# Patient Record
Sex: FEMALE | Race: WHITE | Employment: FULL TIME | URBAN - METROPOLITAN AREA
[De-identification: names, ages, dates, MRNs, and addresses within clinical notes are randomized per-mention and may not be internally consistent; named-entity substitution may affect disease eponyms.]

---

## 2024-02-27 ENCOUNTER — APPOINTMENT (EMERGENCY)
Dept: CT IMAGING | Facility: HOSPITAL | Age: 34
DRG: 682 | End: 2024-02-27
Payer: COMMERCIAL

## 2024-02-27 ENCOUNTER — APPOINTMENT (EMERGENCY)
Dept: RADIOLOGY | Facility: HOSPITAL | Age: 34
DRG: 682 | End: 2024-02-27
Payer: COMMERCIAL

## 2024-02-27 ENCOUNTER — HOSPITAL ENCOUNTER (INPATIENT)
Facility: HOSPITAL | Age: 34
LOS: 2 days | Discharge: HOME | DRG: 682 | End: 2024-02-29
Attending: EMERGENCY MEDICINE | Admitting: STUDENT IN AN ORGANIZED HEALTH CARE EDUCATION/TRAINING PROGRAM
Payer: COMMERCIAL

## 2024-02-27 DIAGNOSIS — E13.10 DIABETIC KETOACIDOSIS WITHOUT COMA ASSOCIATED WITH OTHER SPECIFIED DIABETES MELLITUS (HCC): ICD-10-CM

## 2024-02-27 DIAGNOSIS — E11.00 HYPEROSMOLAR HYPERGLYCEMIC STATE (HHS) (HCC): ICD-10-CM

## 2024-02-27 DIAGNOSIS — R73.9 HYPERGLYCEMIA: Primary | ICD-10-CM

## 2024-02-27 DIAGNOSIS — R79.89 ELEVATED SERUM CREATININE: ICD-10-CM

## 2024-02-27 DIAGNOSIS — R74.01 TRANSAMINITIS: ICD-10-CM

## 2024-02-27 PROBLEM — E87.0 HYPERNATREMIA: Status: ACTIVE | Noted: 2024-02-27

## 2024-02-27 PROBLEM — N17.9 AKI (ACUTE KIDNEY INJURY) (HCC): Status: ACTIVE | Noted: 2024-02-27

## 2024-02-27 PROBLEM — D72.829 LEUKOCYTOSIS: Status: ACTIVE | Noted: 2024-02-27

## 2024-02-27 PROBLEM — R00.0 SINUS TACHYCARDIA: Status: ACTIVE | Noted: 2024-02-27

## 2024-02-27 LAB
2HR DELTA HS TROPONIN: 9 NG/L
4HR DELTA HS TROPONIN: 28 NG/L
ALBUMIN SERPL BCG-MCNC: 4.1 G/DL (ref 3.5–5)
ALP SERPL-CCNC: 198 U/L (ref 34–104)
ALT SERPL W P-5'-P-CCNC: 181 U/L (ref 7–52)
ANION GAP SERPL CALCULATED.3IONS-SCNC: 13 MMOL/L
ANION GAP SERPL CALCULATED.3IONS-SCNC: 31 MMOL/L
AST SERPL W P-5'-P-CCNC: 89 U/L (ref 13–39)
B-OH-BUTYR SERPL-MCNC: 11.12 MMOL/L (ref 0.02–0.27)
BACTERIA UR QL AUTO: ABNORMAL /HPF
BASE EX.OXY STD BLDV CALC-SCNC: 70 % (ref 60–80)
BASE EX.OXY STD BLDV CALC-SCNC: 75.5 % (ref 60–80)
BASE EXCESS BLDV CALC-SCNC: -0.8 MMOL/L
BASE EXCESS BLDV CALC-SCNC: -10.3 MMOL/L
BASOPHILS # BLD AUTO: 0.03 THOUSANDS/ΜL (ref 0–0.1)
BASOPHILS NFR BLD AUTO: 0 % (ref 0–1)
BILIRUB SERPL-MCNC: 0.51 MG/DL (ref 0.2–1)
BILIRUB UR QL STRIP: NEGATIVE
BUN SERPL-MCNC: 29 MG/DL (ref 5–25)
BUN SERPL-MCNC: 53 MG/DL (ref 5–25)
CA-I BLD-SCNC: 1.14 MMOL/L (ref 1.12–1.32)
CALCIUM SERPL-MCNC: 10 MG/DL (ref 8.4–10.2)
CALCIUM SERPL-MCNC: 8.8 MG/DL (ref 8.4–10.2)
CARDIAC TROPONIN I PNL SERPL HS: 19 NG/L
CARDIAC TROPONIN I PNL SERPL HS: 28 NG/L
CARDIAC TROPONIN I PNL SERPL HS: 47 NG/L
CHLORIDE SERPL-SCNC: 101 MMOL/L (ref 96–108)
CHLORIDE SERPL-SCNC: 115 MMOL/L (ref 96–108)
CLARITY UR: CLEAR
CO2 SERPL-SCNC: 15 MMOL/L (ref 21–32)
CO2 SERPL-SCNC: 24 MMOL/L (ref 21–32)
COLOR UR: ABNORMAL
CREAT SERPL-MCNC: 1.18 MG/DL (ref 0.6–1.3)
CREAT SERPL-MCNC: 1.82 MG/DL (ref 0.6–1.3)
D DIMER PPP FEU-MCNC: 0.92 UG/ML FEU
EOSINOPHIL # BLD AUTO: 0 THOUSAND/ΜL (ref 0–0.61)
EOSINOPHIL NFR BLD AUTO: 0 % (ref 0–6)
ERYTHROCYTE [DISTWIDTH] IN BLOOD BY AUTOMATED COUNT: 12.5 % (ref 11.6–15.1)
FLUAV RNA RESP QL NAA+PROBE: NEGATIVE
FLUBV RNA RESP QL NAA+PROBE: NEGATIVE
GFR SERPL CREATININE-BSD FRML MDRD: 36 ML/MIN/1.73SQ M
GFR SERPL CREATININE-BSD FRML MDRD: 61 ML/MIN/1.73SQ M
GLUCOSE SERPL-MCNC: 1004 MG/DL (ref 65–140)
GLUCOSE SERPL-MCNC: 201 MG/DL (ref 65–140)
GLUCOSE SERPL-MCNC: 242 MG/DL (ref 65–140)
GLUCOSE SERPL-MCNC: 289 MG/DL (ref 65–140)
GLUCOSE SERPL-MCNC: 362 MG/DL (ref 65–140)
GLUCOSE SERPL-MCNC: >500 MG/DL (ref 65–140)
GLUCOSE SERPL-MCNC: >500 MG/DL (ref 65–140)
GLUCOSE UR STRIP-MCNC: ABNORMAL MG/DL
HCG SERPL QL: NEGATIVE
HCO3 BLDV-SCNC: 14.5 MMOL/L (ref 24–30)
HCO3 BLDV-SCNC: 23.9 MMOL/L (ref 24–30)
HCT VFR BLD AUTO: 46.2 % (ref 34.8–46.1)
HGB BLD-MCNC: 15.1 G/DL (ref 11.5–15.4)
HGB UR QL STRIP.AUTO: ABNORMAL
HYALINE CASTS #/AREA URNS LPF: ABNORMAL /LPF
IMM GRANULOCYTES # BLD AUTO: 0.05 THOUSAND/UL (ref 0–0.2)
IMM GRANULOCYTES NFR BLD AUTO: 0 % (ref 0–2)
KETONES UR STRIP-MCNC: ABNORMAL MG/DL
LEUKOCYTE ESTERASE UR QL STRIP: ABNORMAL
LYMPHOCYTES # BLD AUTO: 2.36 THOUSANDS/ΜL (ref 0.6–4.47)
LYMPHOCYTES NFR BLD AUTO: 16 % (ref 14–44)
MAGNESIUM SERPL-MCNC: 2.4 MG/DL (ref 1.9–2.7)
MCH RBC QN AUTO: 28.7 PG (ref 26.8–34.3)
MCHC RBC AUTO-ENTMCNC: 32.7 G/DL (ref 31.4–37.4)
MCV RBC AUTO: 88 FL (ref 82–98)
MONOCYTES # BLD AUTO: 1.21 THOUSAND/ΜL (ref 0.17–1.22)
MONOCYTES NFR BLD AUTO: 8 % (ref 4–12)
MUCOUS THREADS UR QL AUTO: ABNORMAL
NEUTROPHILS # BLD AUTO: 11.33 THOUSANDS/ΜL (ref 1.85–7.62)
NEUTS SEG NFR BLD AUTO: 76 % (ref 43–75)
NITRITE UR QL STRIP: NEGATIVE
NON-SQ EPI CELLS URNS QL MICRO: ABNORMAL /HPF
NRBC BLD AUTO-RTO: 0 /100 WBCS
O2 CT BLDV-SCNC: 14.7 ML/DL
O2 CT BLDV-SCNC: 16.2 ML/DL
PCO2 BLDV: 29.6 MM HG (ref 42–50)
PCO2 BLDV: 39.8 MM HG (ref 42–50)
PH BLDV: 7.31 [PH] (ref 7.3–7.4)
PH BLDV: 7.4 [PH] (ref 7.3–7.4)
PH UR STRIP.AUTO: 5 [PH]
PHOSPHATE SERPL-MCNC: 2.1 MG/DL (ref 2.7–4.5)
PLATELET # BLD AUTO: 511 THOUSANDS/UL (ref 149–390)
PMV BLD AUTO: 12.4 FL (ref 8.9–12.7)
PO2 BLDV: 33.5 MM HG (ref 35–45)
PO2 BLDV: 41.6 MM HG (ref 35–45)
POTASSIUM SERPL-SCNC: 3.6 MMOL/L (ref 3.5–5.3)
POTASSIUM SERPL-SCNC: 5.3 MMOL/L (ref 3.5–5.3)
PROT SERPL-MCNC: 8 G/DL (ref 6.4–8.4)
PROT UR STRIP-MCNC: ABNORMAL MG/DL
RBC # BLD AUTO: 5.27 MILLION/UL (ref 3.81–5.12)
RBC #/AREA URNS AUTO: ABNORMAL /HPF
RSV RNA RESP QL NAA+PROBE: NEGATIVE
SARS-COV-2 RNA RESP QL NAA+PROBE: NEGATIVE
SODIUM SERPL-SCNC: 147 MMOL/L (ref 135–147)
SODIUM SERPL-SCNC: 152 MMOL/L (ref 135–147)
SP GR UR STRIP.AUTO: 1.03 (ref 1–1.03)
UROBILINOGEN UR STRIP-ACNC: <2 MG/DL
WBC # BLD AUTO: 14.98 THOUSAND/UL (ref 4.31–10.16)
WBC #/AREA URNS AUTO: ABNORMAL /HPF

## 2024-02-27 PROCEDURE — 82805 BLOOD GASES W/O2 SATURATION: CPT | Performed by: NURSE PRACTITIONER

## 2024-02-27 PROCEDURE — 82948 REAGENT STRIP/BLOOD GLUCOSE: CPT

## 2024-02-27 PROCEDURE — 36415 COLL VENOUS BLD VENIPUNCTURE: CPT | Performed by: PHYSICIAN ASSISTANT

## 2024-02-27 PROCEDURE — 83036 HEMOGLOBIN GLYCOSYLATED A1C: CPT | Performed by: STUDENT IN AN ORGANIZED HEALTH CARE EDUCATION/TRAINING PROGRAM

## 2024-02-27 PROCEDURE — 0241U HB NFCT DS VIR RESP RNA 4 TRGT: CPT | Performed by: PHYSICIAN ASSISTANT

## 2024-02-27 PROCEDURE — 83735 ASSAY OF MAGNESIUM: CPT | Performed by: NURSE PRACTITIONER

## 2024-02-27 PROCEDURE — 96365 THER/PROPH/DIAG IV INF INIT: CPT

## 2024-02-27 PROCEDURE — 71275 CT ANGIOGRAPHY CHEST: CPT

## 2024-02-27 PROCEDURE — 71045 X-RAY EXAM CHEST 1 VIEW: CPT

## 2024-02-27 PROCEDURE — 82805 BLOOD GASES W/O2 SATURATION: CPT | Performed by: PHYSICIAN ASSISTANT

## 2024-02-27 PROCEDURE — 80053 COMPREHEN METABOLIC PANEL: CPT | Performed by: PHYSICIAN ASSISTANT

## 2024-02-27 PROCEDURE — 99285 EMERGENCY DEPT VISIT HI MDM: CPT | Performed by: EMERGENCY MEDICINE

## 2024-02-27 PROCEDURE — 96361 HYDRATE IV INFUSION ADD-ON: CPT

## 2024-02-27 PROCEDURE — 99285 EMERGENCY DEPT VISIT HI MDM: CPT

## 2024-02-27 PROCEDURE — 82010 KETONE BODYS QUAN: CPT | Performed by: PHYSICIAN ASSISTANT

## 2024-02-27 PROCEDURE — 84100 ASSAY OF PHOSPHORUS: CPT | Performed by: NURSE PRACTITIONER

## 2024-02-27 PROCEDURE — 81001 URINALYSIS AUTO W/SCOPE: CPT | Performed by: PHYSICIAN ASSISTANT

## 2024-02-27 PROCEDURE — 85025 COMPLETE CBC W/AUTO DIFF WBC: CPT | Performed by: PHYSICIAN ASSISTANT

## 2024-02-27 PROCEDURE — 93005 ELECTROCARDIOGRAM TRACING: CPT

## 2024-02-27 PROCEDURE — 85379 FIBRIN DEGRADATION QUANT: CPT | Performed by: PHYSICIAN ASSISTANT

## 2024-02-27 PROCEDURE — 84484 ASSAY OF TROPONIN QUANT: CPT | Performed by: PHYSICIAN ASSISTANT

## 2024-02-27 PROCEDURE — 80048 BASIC METABOLIC PNL TOTAL CA: CPT | Performed by: NURSE PRACTITIONER

## 2024-02-27 PROCEDURE — 99291 CRITICAL CARE FIRST HOUR: CPT | Performed by: STUDENT IN AN ORGANIZED HEALTH CARE EDUCATION/TRAINING PROGRAM

## 2024-02-27 PROCEDURE — 82330 ASSAY OF CALCIUM: CPT | Performed by: NURSE PRACTITIONER

## 2024-02-27 PROCEDURE — 84703 CHORIONIC GONADOTROPIN ASSAY: CPT | Performed by: PHYSICIAN ASSISTANT

## 2024-02-27 RX ORDER — SODIUM CHLORIDE, SODIUM GLUCONATE, SODIUM ACETATE, POTASSIUM CHLORIDE, MAGNESIUM CHLORIDE, SODIUM PHOSPHATE, DIBASIC, AND POTASSIUM PHOSPHATE .53; .5; .37; .037; .03; .012; .00082 G/100ML; G/100ML; G/100ML; G/100ML; G/100ML; G/100ML; G/100ML
75 INJECTION, SOLUTION INTRAVENOUS CONTINUOUS
Status: DISCONTINUED | OUTPATIENT
Start: 2024-02-27 | End: 2024-02-29 | Stop reason: HOSPADM

## 2024-02-27 RX ORDER — ENOXAPARIN SODIUM 100 MG/ML
40 INJECTION SUBCUTANEOUS DAILY
Status: DISCONTINUED | OUTPATIENT
Start: 2024-02-28 | End: 2024-02-27

## 2024-02-27 RX ORDER — HEPARIN SODIUM 5000 [USP'U]/ML
5000 INJECTION, SOLUTION INTRAVENOUS; SUBCUTANEOUS EVERY 8 HOURS SCHEDULED
Status: DISCONTINUED | OUTPATIENT
Start: 2024-02-28 | End: 2024-02-29 | Stop reason: HOSPADM

## 2024-02-27 RX ORDER — DEXTROSE MONOHYDRATE 50 MG/ML
50 INJECTION, SOLUTION INTRAVENOUS CONTINUOUS
Status: DISCONTINUED | OUTPATIENT
Start: 2024-02-27 | End: 2024-02-28

## 2024-02-27 RX ADMIN — SODIUM CHLORIDE 1000 ML: 0.9 INJECTION, SOLUTION INTRAVENOUS at 18:00

## 2024-02-27 RX ADMIN — SODIUM CHLORIDE 2000 ML: 0.9 INJECTION, SOLUTION INTRAVENOUS at 13:24

## 2024-02-27 RX ADMIN — HEPARIN SODIUM 5000 UNITS: 5000 INJECTION INTRAVENOUS; SUBCUTANEOUS at 23:54

## 2024-02-27 RX ADMIN — IOHEXOL 85 ML: 350 INJECTION, SOLUTION INTRAVENOUS at 16:29

## 2024-02-27 RX ADMIN — DEXTROSE 50 ML/HR: 5 SOLUTION INTRAVENOUS at 23:54

## 2024-02-27 RX ADMIN — SODIUM CHLORIDE 10 UNITS/HR: 9 INJECTION, SOLUTION INTRAVENOUS at 16:50

## 2024-02-27 RX ADMIN — SODIUM CHLORIDE, SODIUM GLUCONATE, SODIUM ACETATE, POTASSIUM CHLORIDE, MAGNESIUM CHLORIDE, SODIUM PHOSPHATE, DIBASIC, AND POTASSIUM PHOSPHATE 125 ML/HR: .53; .5; .37; .037; .03; .012; .00082 INJECTION, SOLUTION INTRAVENOUS at 23:17

## 2024-02-27 NOTE — ED PROVIDER NOTES
History  Chief Complaint   Patient presents with    Hyperglycemia - Symptomatic     Fatigue, muscle cramps, thirst x 2 weeks. Blood sugar checked at home. Read over 600, no dx of DM. Blood pressure 80/50 at home.      Yu Ariza is a 33-year-old female with no known pmhx arriving to the emergency department accompanied by family members for evaluation with chief complaint of dry mouth and increased thirst.  She reports that symptoms have been gradually worsening over the past 2 weeks, even more so within the past 48 hours.  She reports symptoms of fatigue, generalized weakness, exertional dyspnea, polyuria. She denies any chest pain, palpitations, near syncope, abdominal pain, nausea/vomiting/diarrhea. POCT glucose on arrival greater than 500. Patient denies any pertinent past medical history and also does not take any medications.  Denies exogenous estrogen use. She denies alcohol or tobacco use.  Reports flulike symptoms about 2 weeks ago during which time she presented to urgent care at that time and had been diagnosed with pansinusitis and received a prescription for amoxicillin. She denies residual symptoms presently.  Patient and family members offer no other complaints or concerns at this time.         None       History reviewed. No pertinent past medical history.    History reviewed. No pertinent surgical history.    History reviewed. No pertinent family history.  I have reviewed and agree with the history as documented.    E-Cigarette/Vaping     E-Cigarette/Vaping Substances     Social History     Tobacco Use    Smoking status: Never    Smokeless tobacco: Never   Substance Use Topics    Alcohol use: Not Currently    Drug use: Not Currently       Review of Systems   Constitutional:  Positive for fatigue.   Respiratory:  Positive for shortness of breath.    Cardiovascular:  Negative for chest pain.   Gastrointestinal:  Negative for abdominal pain, diarrhea, nausea and vomiting.   Endocrine: Positive  for polydipsia and polyphagia.   Musculoskeletal:  Negative for back pain.   Skin:  Negative for pallor.   Neurological:  Positive for weakness. Negative for dizziness and headaches.   All other systems reviewed and are negative.      Physical Exam  Physical Exam  Vitals and nursing note reviewed.   Constitutional:       General: She is not in acute distress.     Appearance: Normal appearance. She is well-developed. She is not toxic-appearing or diaphoretic.   HENT:      Head: Normocephalic and atraumatic.      Right Ear: External ear normal.      Left Ear: External ear normal.      Mouth/Throat:      Mouth: Mucous membranes are dry.   Eyes:      Conjunctiva/sclera: Conjunctivae normal.   Cardiovascular:      Rate and Rhythm: Regular rhythm. Tachycardia present.      Pulses: Normal pulses.   Pulmonary:      Effort: Pulmonary effort is normal. No respiratory distress.      Breath sounds: Normal breath sounds. No wheezing, rhonchi or rales.      Comments: Normal respiratory effort  Chest:      Chest wall: No tenderness.   Abdominal:      General: There is no distension.      Palpations: Abdomen is soft.      Tenderness: There is no abdominal tenderness.   Musculoskeletal:         General: Normal range of motion.      Cervical back: Normal range of motion and neck supple.   Skin:     General: Skin is warm and dry.      Capillary Refill: Capillary refill takes less than 2 seconds.   Neurological:      Mental Status: She is alert.      Motor: Motor function is intact. No weakness.      Gait: Gait is intact.   Psychiatric:         Mood and Affect: Mood normal.         Vital Signs  ED Triage Vitals   Temperature Pulse Respirations Blood Pressure SpO2   02/27/24 1255 02/27/24 1258 02/27/24 1258 02/27/24 1258 02/27/24 1258   97.9 °F (36.6 °C) (!) 147 18 116/74 98 %      Temp Source Heart Rate Source Patient Position - Orthostatic VS BP Location FiO2 (%)   02/27/24 1255 02/27/24 1430 02/27/24 1258 02/27/24 1258 --   Temporal  Monitor Sitting Left arm       Pain Score       --                  Vitals:    02/27/24 1600 02/27/24 1645 02/27/24 1700 02/27/24 1730   BP: 123/58 120/71 129/79 125/85   Pulse: (!) 132 (!) 133 (!) 135 (!) 139   Patient Position - Orthostatic VS: Lying Lying Lying Lying         Visual Acuity  Visual Acuity      Flowsheet Row Most Recent Value   L Pupil Size (mm) 3   R Pupil Size (mm) 3            ED Medications  Medications   insulin regular (HumuLIN R,NovoLIN R) 1 Units/mL in sodium chloride 0.9 % 100 mL infusion (10 Units/hr Intravenous New Bag 2/27/24 1650)   sodium chloride 0.9 % bolus 1,000 mL (1,000 mL Intravenous New Bag 2/27/24 1800)   sodium chloride 0.9 % bolus 2,000 mL (0 mL Intravenous Stopped 2/27/24 1424)   iohexol (OMNIPAQUE) 350 MG/ML injection (MULTI-DOSE) 85 mL (85 mL Intravenous Given 2/27/24 1629)       Diagnostic Studies  Results Reviewed       Procedure Component Value Units Date/Time    Fingerstick Glucose (POCT) [[id number]]  (Abnormal) Collected: 02/27/24 1727    Lab Status: Final result Updated: 02/27/24 1728     POC Glucose >500 mg/dl     HS Troponin I 2hr [[id number]] Collected: 02/27/24 1539    Lab Status: No result Specimen: Blood from Arm, Right     HS Troponin I 4hr [[id number]]     Lab Status: No result Specimen: Blood     hCG, qualitative pregnancy [[id number]]  (Normal) Collected: 02/27/24 1410    Lab Status: Final result Specimen: Blood from Arm, Right Updated: 02/27/24 1445     Preg, Serum Negative    Blood gas, venous [[id number]]  (Abnormal) Collected: 02/27/24 1410    Lab Status: Final result Specimen: Blood from Arm, Right Updated: 02/27/24 1415     pH, Ang 7.307     pCO2, Ang 29.6 mm Hg      pO2, Ang 41.6 mm Hg      HCO3, Ang 14.5 mmol/L      Base Excess, Ang -10.3 mmol/L      O2 Content, Ang 16.2 ml/dL      O2 HGB, VENOUS 75.5 %     Urine Microscopic [[id number]]  (Abnormal) Collected: 02/27/24 7602    Lab Status: Final result Specimen: Urine, Clean Catch Updated: 02/27/24 7498      RBC, UA 1-2 /hpf      WBC, UA 1-2 /hpf      Epithelial Cells Occasional /hpf      Bacteria, UA Moderate /hpf      MUCUS THREADS Occasional     Hyaline Casts, UA 0-3 /lpf     FLU/RSV/COVID - if FLU/RSV clinically relevant [[id number]]  (Normal) Collected: 02/27/24 1326    Lab Status: Final result Specimen: Nares from Nose Updated: 02/27/24 1412     SARS-CoV-2 Negative     INFLUENZA A PCR Negative     INFLUENZA B PCR Negative     RSV PCR Negative    Narrative:      FOR PEDIATRIC PATIENTS - copy/paste COVID Guidelines URL to browser: [email or web address]    SARS-CoV-2 assay is a Nucleic Acid Amplification assay intended for the  qualitative detection of nucleic acid from SARS-CoV-2 in nasopharyngeal  swabs. Results are for the presumptive identification of SARS-CoV-2 RNA.    Positive results are indicative of infection with SARS-CoV-2, the virus  causing COVID-19, but do not rule out bacterial infection or co-infection  with other viruses. Laboratories within the [place] and its  territories are required to report all positive results to the appropriate  public health authorities. Negative results do not preclude SARS-CoV-2  infection and should not be used as the sole basis for treatment or other  patient management decisions. Negative results must be combined with  clinical observations, patient history, and epidemiological information.  This test has not been FDA cleared or approved.    This test has been authorized by FDA under an Emergency Use Authorization  (EUA). This test is only authorized for the duration of time the  declaration that circumstances exist justifying the authorization of the  emergency use of an in vitro diagnostic tests for detection of SARS-CoV-2  virus and/or diagnosis of COVID-19 infection under section 564(b)(1) of  the Act, 21 U.S.C. 360bbb-3(b)(1), unless the authorization is terminated  or revoked sooner. The test has been  validated but independent review by FDA  and CLIA is pending.    Test performed using [employer] GeneXpert: This RT-PCR assay targets N2,  a region unique to SARS-CoV-2. A conserved region in the E-gene was chosen  for pan-Sarbecovirus detection which includes SARS-CoV-2.    According to CMS-2020-01-R, this platform meets the definition of high-throughput technology.    Comprehensive metabolic panel [[id number]]  (Abnormal) Collected: 02/27/24 1320    Lab Status: Final result Specimen: Blood from Arm, Right Updated: 02/27/24 1411     Sodium 147 mmol/L      Potassium 5.3 mmol/L      Chloride 101 mmol/L      CO2 15 mmol/L      ANION GAP 31 mmol/L      BUN 53 mg/dL      Creatinine 1.82 mg/dL      Glucose 1,004 mg/dL      Calcium 10.0 mg/dL      AST 89 U/L       U/L      Alkaline Phosphatase 198 U/L      Total Protein 8.0 g/dL      Albumin 4.1 g/dL      Total Bilirubin 0.51 mg/dL      eGFR 36 ml/min/1.73sq m     Narrative:      National Kidney Disease Foundation guidelines for Chronic Kidney Disease (CKD):     Stage 1 with normal or high GFR (GFR > 90 mL/min/1.73 square meters)    Stage 2 Mild CKD (GFR = 60-89 mL/min/1.73 square meters)    Stage 3A Moderate CKD (GFR = 45-59 mL/min/1.73 square meters)    Stage 3B Moderate CKD (GFR = 30-44 mL/min/1.73 square meters)    Stage 4 Severe CKD (GFR = 15-29 mL/min/1.73 square meters)    Stage 5 End Stage CKD (GFR <15 mL/min/1.73 square meters)  Note: GFR calculation is accurate only with a steady state creatinine    Beta Hydroxybutyrate [[id number]]  (Abnormal) Collected: 02/27/24 1320    Lab Status: Final result Specimen: Blood from Arm, Right Updated: 02/27/24 1407     Beta- Hydroxybutyrate 11.12 mmol/L     UA w Reflex to Microscopic w Reflex to Culture [[id number]]  (Abnormal) Collected: 02/27/24 1352    Lab Status: Final result Specimen: Urine, Clean Catch Updated: 02/27/24 1406     Color, UA Light Yellow     Clarity, UA Clear     Specific Gravity, UA 1.031     pH, UA 5.0      Leukocytes, UA Elevated glucose may cause decreased leukocyte values. See urine microscopic for UWBC result     Nitrite, UA Negative     Protein, UA Trace mg/dl      Glucose, UA >=1000 (1%) mg/dl      Ketones, UA 40 (2+) mg/dl      Urobilinogen, UA <2.0 mg/dl      Bilirubin, UA Negative     Occult Blood, UA Large    HS Troponin 0hr (reflex protocol) [[id number]]  (Normal) Collected: 02/27/24 1326    Lab Status: Final result Specimen: Blood from Arm, Right Updated: 02/27/24 1401     hs TnI 0hr 19 ng/L     D-dimer, quantitative [[id number]]  (Abnormal) Collected: 02/27/24 1326    Lab Status: Final result Specimen: Blood from Arm, Right Updated: 02/27/24 1352     D-Dimer, Quant 0.92 ug/ml FEU     CBC and differential [[id number]]  (Abnormal) Collected: 02/27/24 1320    Lab Status: Final result Specimen: Blood from Arm, Right Updated: 02/27/24 1336     WBC 14.98 Thousand/uL      RBC 5.27 Million/uL      Hemoglobin 15.1 g/dL      Hematocrit 46.2 %      MCV 88 fL      MCH 28.7 pg      MCHC 32.7 g/dL      RDW 12.5 %      MPV 12.4 fL      Platelets 511 Thousands/uL      nRBC 0 /100 WBCs      Neutrophils Relative 76 %      Immat GRANS % 0 %      Lymphocytes Relative 16 %      Monocytes Relative 8 %      Eosinophils Relative 0 %      Basophils Relative 0 %      Neutrophils Absolute 11.33 Thousands/µL      Immature Grans Absolute 0.05 Thousand/uL      Lymphocytes Absolute 2.36 Thousands/µL      Monocytes Absolute 1.21 Thousand/µL      Eosinophils Absolute 0.00 Thousand/µL      Basophils Absolute 0.03 Thousands/µL     Fingerstick Glucose (POCT) [[id number]]  (Abnormal) Collected: 02/27/24 1256    Lab Status: Final result Updated: 02/27/24 1257     POC Glucose >500 mg/dl                    CTA ED chest PE Study   Final Result by lEda Parikh MD (02/27 1705)      No pulmonary embolism.      No acute pulmonary pathology.      Significant steatosis in the visualized liver.                  Workstation performed: [id number]          XR chest 1 view portable   Final Result by John Summers MD (02/27 6152)      No acute cardiopulmonary disease.            Workstation performed: [id number]                    Procedures  ECG 12 Lead Documentation Only    Date/Time: 2/27/2024 1:05 PM    Performed by: Brionna Patel PA-C  Authorized by: Brionna Patel PA-C    Indications / Diagnosis:  Fatigue  ECG reviewed by me, the ED Provider: yes    Patient location:  ED  Rate:     ECG rate:  147  Rhythm:     Rhythm: sinus tachycardia    Ectopy:     Ectopy: none    QRS:     QRS axis:  Normal    QRS intervals:  Normal  ST segments:     ST segments:  Non-specific  T waves:     T waves: non-specific    Other findings:     Other findings: prolonged qTc interval      Other findings comment:  QTc 522ms           ED Course                               SBIRT 22yo+      Flowsheet Row Most Recent Value   Initial Alcohol Screen: US AUDIT-C     1. How often do you have a drink containing alcohol? 0 Filed at: 02/27/2024 1330   2. How many drinks containing alcohol do you have on a typical day you are drinking?  0 Filed at: 02/27/2024 1330   3b. FEMALE Any Age, or MALE 65+: How often do you have 4 or more drinks on one occassion? 0 Filed at: 02/27/2024 1330   Audit-C Score 0 Filed at: 02/27/2024 1330   AARON: How many times in the past year have you...    Used an illegal drug or used a prescription medication for non-medical reasons? Never Filed at: 02/27/2024 1330                      Medical Decision Making  This is a 34yo F with no known pmhx presenting for evaluation with chief complaint of dry mouth, polydipsia, polyuria.     Differential diagnosis includes but is not limited to: DKA, HHS, acs/anginal equivalent, arrhythmia, PE, pneumonia, electrolyte derangement, connor, anemia, viral syndrome    Initial ED plan: ecg, labs, imaging    Final ED Assessment: Vital signs reviewed on ED presentation, examination as above. All labs and imaging independently reviewed  with imaging interpreted by the Radiologist.  ECG demonstrates sinus tachycardia.  Glucose 1004 on CMP in addition to elevated serum creatinine of 1.82, and mildly elevated LFTs.  pH 7.037. Anion gap 31.  D-dimer elevated and CTA chest ordered which reports no pulmonary embolism or acute pulmonary pathology with significant steatosis of the visualized liver.  COVID/flu/RSV negative.  Case reviewed with ICU attending Dr. Calvo.  As the patient is awake, alert, and in no acute respiratory distress feels that the patient may be admitted to the internal medicine service for further management.  All test results reviewed with the patient and family members at bedside as well as plan to proceed with hospital admission for continued care and further management per the internal medicine service which they are understanding of and agreeable with.  Discussed case with jed Su, accepts patient for admission.  The patient has remained hemodynamically stable throughout ED course without new or worsening symptoms and is stable for admission, bridging orders placed.      Amount and/or Complexity of Data Reviewed  Labs: ordered.  Radiology: ordered.  ECG/medicine tests: ordered.    Risk  Prescription drug management.  Decision regarding hospitalization.             Disposition  Final diagnoses:   Hyperglycemia   Elevated serum creatinine   Transaminitis     Time reflects when diagnosis was documented in both MDM as applicable and the Disposition within this note       Time User Action Codes Description Comment    2/27/2024  4:46 PM Brionna Patel Add [R73.9] Hyperglycemia     2/27/2024  5:31 PM Brionna Patel Add [R79.89] Elevated serum creatinine     2/27/2024  5:32 PM Brionna Patel Add [R74.01] Transaminitis           ED Disposition       ED Disposition   Admit    Condition   Stable    Date/Time   Tue Feb 27, 2024  4:15 PM    Comment                  Follow-up Information    None         Patient's Medications     No medications on file       No discharge procedures on file.    PDMP Review       None            ED Provider  Electronically Signed by             Brionna Patel PA-C  02/27/24 1959

## 2024-02-28 PROBLEM — E11.10 DKA (DIABETIC KETOACIDOSIS) (HCC): Status: ACTIVE | Noted: 2024-02-27

## 2024-02-28 LAB
ANION GAP SERPL CALCULATED.3IONS-SCNC: 12 MMOL/L
ANION GAP SERPL CALCULATED.3IONS-SCNC: 14 MMOL/L
ATRIAL RATE: 147 BPM
BUN SERPL-MCNC: 17 MG/DL (ref 5–25)
BUN SERPL-MCNC: 23 MG/DL (ref 5–25)
CALCIUM SERPL-MCNC: 8.3 MG/DL (ref 8.4–10.2)
CALCIUM SERPL-MCNC: 8.4 MG/DL (ref 8.4–10.2)
CHLORIDE SERPL-SCNC: 102 MMOL/L (ref 96–108)
CHLORIDE SERPL-SCNC: 109 MMOL/L (ref 96–108)
CHOLEST SERPL-MCNC: 201 MG/DL
CO2 SERPL-SCNC: 21 MMOL/L (ref 21–32)
CO2 SERPL-SCNC: 21 MMOL/L (ref 21–32)
CREAT SERPL-MCNC: 0.71 MG/DL (ref 0.6–1.3)
CREAT SERPL-MCNC: 1 MG/DL (ref 0.6–1.3)
ERYTHROCYTE [DISTWIDTH] IN BLOOD BY AUTOMATED COUNT: 12.6 % (ref 11.6–15.1)
EST. AVERAGE GLUCOSE BLD GHB EST-MCNC: 413 MG/DL
GFR SERPL CREATININE-BSD FRML MDRD: 113 ML/MIN/1.73SQ M
GFR SERPL CREATININE-BSD FRML MDRD: 75 ML/MIN/1.73SQ M
GLUCOSE SERPL-MCNC: 249 MG/DL (ref 65–140)
GLUCOSE SERPL-MCNC: 277 MG/DL (ref 65–140)
GLUCOSE SERPL-MCNC: 298 MG/DL (ref 65–140)
GLUCOSE SERPL-MCNC: 305 MG/DL (ref 65–140)
GLUCOSE SERPL-MCNC: 308 MG/DL (ref 65–140)
GLUCOSE SERPL-MCNC: 334 MG/DL (ref 65–140)
GLUCOSE SERPL-MCNC: 340 MG/DL (ref 65–140)
GLUCOSE SERPL-MCNC: 391 MG/DL (ref 65–140)
HBA1C MFR BLD: 16 %
HCT VFR BLD AUTO: 38.9 % (ref 34.8–46.1)
HDLC SERPL-MCNC: 32 MG/DL
HGB BLD-MCNC: 12.8 G/DL (ref 11.5–15.4)
LDLC SERPL CALC-MCNC: 94 MG/DL (ref 0–100)
MCH RBC QN AUTO: 28.6 PG (ref 26.8–34.3)
MCHC RBC AUTO-ENTMCNC: 32.9 G/DL (ref 31.4–37.4)
MCV RBC AUTO: 87 FL (ref 82–98)
NONHDLC SERPL-MCNC: 169 MG/DL
P AXIS: 76 DEGREES
PLATELET # BLD AUTO: 291 THOUSANDS/UL (ref 149–390)
PMV BLD AUTO: 11.4 FL (ref 8.9–12.7)
POTASSIUM SERPL-SCNC: 3.9 MMOL/L (ref 3.5–5.3)
POTASSIUM SERPL-SCNC: 4 MMOL/L (ref 3.5–5.3)
PR INTERVAL: 120 MS
PROCALCITONIN SERPL-MCNC: 0.41 NG/ML
QRS AXIS: 60 DEGREES
QRSD INTERVAL: 62 MS
QT INTERVAL: 334 MS
QTC INTERVAL: 522 MS
RBC # BLD AUTO: 4.48 MILLION/UL (ref 3.81–5.12)
SODIUM SERPL-SCNC: 135 MMOL/L (ref 135–147)
SODIUM SERPL-SCNC: 144 MMOL/L (ref 135–147)
T WAVE AXIS: 76 DEGREES
TRIGL SERPL-MCNC: 373 MG/DL
VENTRICULAR RATE: 147 BPM
WBC # BLD AUTO: 11.37 THOUSAND/UL (ref 4.31–10.16)

## 2024-02-28 PROCEDURE — 80048 BASIC METABOLIC PNL TOTAL CA: CPT | Performed by: STUDENT IN AN ORGANIZED HEALTH CARE EDUCATION/TRAINING PROGRAM

## 2024-02-28 PROCEDURE — 80061 LIPID PANEL: CPT | Performed by: STUDENT IN AN ORGANIZED HEALTH CARE EDUCATION/TRAINING PROGRAM

## 2024-02-28 PROCEDURE — 99254 IP/OBS CNSLTJ NEW/EST MOD 60: CPT | Performed by: INTERNAL MEDICINE

## 2024-02-28 PROCEDURE — 85027 COMPLETE CBC AUTOMATED: CPT | Performed by: STUDENT IN AN ORGANIZED HEALTH CARE EDUCATION/TRAINING PROGRAM

## 2024-02-28 PROCEDURE — 82948 REAGENT STRIP/BLOOD GLUCOSE: CPT

## 2024-02-28 PROCEDURE — 84145 PROCALCITONIN (PCT): CPT | Performed by: STUDENT IN AN ORGANIZED HEALTH CARE EDUCATION/TRAINING PROGRAM

## 2024-02-28 PROCEDURE — 93010 ELECTROCARDIOGRAM REPORT: CPT | Performed by: INTERNAL MEDICINE

## 2024-02-28 PROCEDURE — G0425 INPT/ED TELECONSULT30: HCPCS | Performed by: INTERNAL MEDICINE

## 2024-02-28 PROCEDURE — 99233 SBSQ HOSP IP/OBS HIGH 50: CPT | Performed by: STUDENT IN AN ORGANIZED HEALTH CARE EDUCATION/TRAINING PROGRAM

## 2024-02-28 PROCEDURE — 36415 COLL VENOUS BLD VENIPUNCTURE: CPT | Performed by: STUDENT IN AN ORGANIZED HEALTH CARE EDUCATION/TRAINING PROGRAM

## 2024-02-28 RX ORDER — INSULIN LISPRO 100 [IU]/ML
2-12 INJECTION, SOLUTION INTRAVENOUS; SUBCUTANEOUS
Status: DISCONTINUED | OUTPATIENT
Start: 2024-02-28 | End: 2024-02-29 | Stop reason: HOSPADM

## 2024-02-28 RX ORDER — SODIUM CHLORIDE 9 MG/ML
125 INJECTION, SOLUTION INTRAVENOUS CONTINUOUS
Status: DISCONTINUED | OUTPATIENT
Start: 2024-02-28 | End: 2024-02-28

## 2024-02-28 RX ORDER — INSULIN GLARGINE 100 [IU]/ML
30 INJECTION, SOLUTION SUBCUTANEOUS EVERY MORNING
Status: DISCONTINUED | OUTPATIENT
Start: 2024-02-28 | End: 2024-02-29 | Stop reason: HOSPADM

## 2024-02-28 RX ORDER — INSULIN GLARGINE 100 [IU]/ML
25 INJECTION, SOLUTION SUBCUTANEOUS
Status: DISCONTINUED | OUTPATIENT
Start: 2024-02-28 | End: 2024-02-28

## 2024-02-28 RX ORDER — INSULIN LISPRO 100 [IU]/ML
10 INJECTION, SOLUTION INTRAVENOUS; SUBCUTANEOUS
Status: DISCONTINUED | OUTPATIENT
Start: 2024-02-29 | End: 2024-02-28

## 2024-02-28 RX ORDER — INSULIN LISPRO 100 [IU]/ML
8 INJECTION, SOLUTION INTRAVENOUS; SUBCUTANEOUS
Status: DISCONTINUED | OUTPATIENT
Start: 2024-02-29 | End: 2024-02-28

## 2024-02-28 RX ORDER — POTASSIUM CHLORIDE 1500 MG/1
40 TABLET, EXTENDED RELEASE ORAL 2 TIMES DAILY
Status: DISCONTINUED | OUTPATIENT
Start: 2024-02-28 | End: 2024-02-29

## 2024-02-28 RX ORDER — INSULIN LISPRO 100 [IU]/ML
10 INJECTION, SOLUTION INTRAVENOUS; SUBCUTANEOUS
Status: DISCONTINUED | OUTPATIENT
Start: 2024-02-28 | End: 2024-02-29 | Stop reason: HOSPADM

## 2024-02-28 RX ORDER — ATORVASTATIN CALCIUM 40 MG/1
40 TABLET, FILM COATED ORAL
Status: DISCONTINUED | OUTPATIENT
Start: 2024-02-28 | End: 2024-02-29 | Stop reason: HOSPADM

## 2024-02-28 RX ADMIN — HEPARIN SODIUM 5000 UNITS: 5000 INJECTION INTRAVENOUS; SUBCUTANEOUS at 14:39

## 2024-02-28 RX ADMIN — INSULIN LISPRO 4 UNITS: 100 INJECTION, SOLUTION INTRAVENOUS; SUBCUTANEOUS at 21:38

## 2024-02-28 RX ADMIN — POTASSIUM CHLORIDE 40 MEQ: 1500 TABLET, EXTENDED RELEASE ORAL at 00:21

## 2024-02-28 RX ADMIN — SODIUM CHLORIDE, SODIUM GLUCONATE, SODIUM ACETATE, POTASSIUM CHLORIDE, MAGNESIUM CHLORIDE, SODIUM PHOSPHATE, DIBASIC, AND POTASSIUM PHOSPHATE 75 ML/HR: .53; .5; .37; .037; .03; .012; .00082 INJECTION, SOLUTION INTRAVENOUS at 14:39

## 2024-02-28 RX ADMIN — POTASSIUM CHLORIDE 40 MEQ: 1500 TABLET, EXTENDED RELEASE ORAL at 08:40

## 2024-02-28 RX ADMIN — INSULIN LISPRO 10 UNITS: 100 INJECTION, SOLUTION INTRAVENOUS; SUBCUTANEOUS at 12:23

## 2024-02-28 RX ADMIN — ATORVASTATIN CALCIUM 40 MG: 40 TABLET, FILM COATED ORAL at 17:24

## 2024-02-28 RX ADMIN — INSULIN LISPRO 8 UNITS: 100 INJECTION, SOLUTION INTRAVENOUS; SUBCUTANEOUS at 11:14

## 2024-02-28 RX ADMIN — INSULIN LISPRO 6 UNITS: 100 INJECTION, SOLUTION INTRAVENOUS; SUBCUTANEOUS at 06:07

## 2024-02-28 RX ADMIN — INSULIN GLARGINE 30 UNITS: 100 INJECTION, SOLUTION SUBCUTANEOUS at 12:27

## 2024-02-28 RX ADMIN — SODIUM CHLORIDE 125 ML/HR: 0.9 INJECTION, SOLUTION INTRAVENOUS at 03:25

## 2024-02-28 RX ADMIN — POTASSIUM CHLORIDE 40 MEQ: 1500 TABLET, EXTENDED RELEASE ORAL at 17:25

## 2024-02-28 RX ADMIN — INSULIN LISPRO 8 UNITS: 100 INJECTION, SOLUTION INTRAVENOUS; SUBCUTANEOUS at 18:06

## 2024-02-28 RX ADMIN — INSULIN LISPRO 10 UNITS: 100 INJECTION, SOLUTION INTRAVENOUS; SUBCUTANEOUS at 18:06

## 2024-02-28 RX ADMIN — HEPARIN SODIUM 5000 UNITS: 5000 INJECTION INTRAVENOUS; SUBCUTANEOUS at 05:20

## 2024-02-28 RX ADMIN — HEPARIN SODIUM 5000 UNITS: 5000 INJECTION INTRAVENOUS; SUBCUTANEOUS at 21:39

## 2024-02-28 NOTE — PLAN OF CARE
Problem: PAIN - ADULT  Goal: Verbalizes/displays adequate comfort level or baseline comfort level  Description: Interventions:  - Encourage patient to monitor pain and request assistance  - Assess pain using appropriate pain scale  - Administer analgesics based on type and severity of pain and evaluate response  - Implement non-pharmacological measures as appropriate and evaluate response  - Consider cultural and social influences on pain and pain management  - Notify physician/advanced practitioner if interventions unsuccessful or patient reports new pain  Outcome: Progressing     Problem: INFECTION - ADULT  Goal: Absence or prevention of progression during hospitalization  Description: INTERVENTIONS:  - Assess and monitor for signs and symptoms of infection  - Monitor lab/diagnostic results  - Monitor all insertion sites, i.e. indwelling lines, tubes, and drains  - Monitor endotracheal if appropriate and nasal secretions for changes in amount and color  - [place] appropriate cooling/warming therapies per order  - Administer medications as ordered  - Instruct and encourage patient and family to use good hand hygiene technique  - Identify and instruct in appropriate isolation precautions for identified infection/condition  Outcome: Progressing  Goal: Absence of fever/infection during neutropenic period  Description: INTERVENTIONS:  - Monitor WBC    Outcome: Progressing     Problem: SAFETY ADULT  Goal: Patient will remain free of falls  Description: INTERVENTIONS:  - Educate patient/family on patient safety including physical limitations  - Instruct patient to call for assistance with activity   - Consult OT/PT to assist with strengthening/mobility   - Keep Call bell within reach  - Keep bed low and locked with side rails adjusted as appropriate  - Keep care items and personal belongings within reach  - Initiate and maintain comfort rounds  - Make Fall Risk Sign visible to staff  - Offer Toileting every  Hours,  in advance of need  - Initiate/Maintain alarm  - Obtain necessary fall risk management equipment:   - Apply yellow socks and bracelet for high fall risk patients  - Consider moving patient to room near nurses station  Outcome: Progressing  Goal: Maintain or return to baseline ADL function  Description: INTERVENTIONS:  -  Assess patient's ability to carry out ADLs; assess patient's baseline for ADL function and identify physical deficits which impact ability to perform ADLs (bathing, care of mouth/teeth, toileting, grooming, dressing, etc.)  - Assess/evaluate cause of self-care deficits   - Assess range of motion  - Assess patient's mobility; develop plan if impaired  - Assess patient's need for assistive devices and provide as appropriate  - Encourage maximum independence but intervene and supervise when necessary  - Involve family in performance of ADLs  - Assess for home care needs following discharge   - Consider OT consult to assist with ADL evaluation and planning for discharge  - Provide patient education as appropriate  Outcome: Progressing  Goal: Maintains/Returns to pre admission functional level  Description: INTERVENTIONS:  - Perform AM-PAC 6 Click Basic Mobility/ Daily Activity assessment daily.  - Set and communicate daily mobility goal to care team and patient/family/caregiver.   - Collaborate with rehabilitation services on mobility goals if consulted  - Perform Range of Motion  times a day.  - Reposition patient every  hours.  - Dangle patient  times a day  - Stand patient  times a day  - Ambulate patient  times a day  - Out of bed to chair  times a day   - Out of bed for meals times a day  - Out of bed for toileting  - Record patient progress and toleration of activity level   Outcome: Progressing     Problem: DISCHARGE PLANNING  Goal: Discharge to home or other facility with appropriate resources  Description: INTERVENTIONS:  - Identify barriers to discharge w/patient and caregiver  - Arrange for  needed discharge resources and transportation as appropriate  - Identify discharge learning needs (meds, wound care, etc.)  - Arrange for interpretive services to assist at discharge as needed  - Refer to Case Management Department for coordinating discharge planning if the patient needs post-hospital services based on physician/advanced practitioner order or complex needs related to functional status, cognitive ability, or social support system  Outcome: Progressing     Problem: Knowledge Deficit  Goal: Patient/family/caregiver demonstrates understanding of disease process, treatment plan, medications, and discharge instructions  Description: Complete learning assessment and assess knowledge base.  Interventions:  - Provide teaching at level of understanding  - Provide teaching via preferred learning methods  Outcome: Progressing

## 2024-02-28 NOTE — ASSESSMENT & PLAN NOTE
Noted with sinus tachycardia secondary to volume depletion  CTA PE study negative for PE.  Discontinue tele

## 2024-02-28 NOTE — PLAN OF CARE
Problem: PAIN - ADULT  Goal: Verbalizes/displays adequate comfort level or baseline comfort level  Description: Interventions:  - Encourage patient to monitor pain and request assistance  - Assess pain using appropriate pain scale  - Administer analgesics based on type and severity of pain and evaluate response  - Implement non-pharmacological measures as appropriate and evaluate response  - Consider cultural and social influences on pain and pain management  - Notify physician/advanced practitioner if interventions unsuccessful or patient reports new pain  Outcome: Progressing     Problem: INFECTION - ADULT  Goal: Absence or prevention of progression during hospitalization  Description: INTERVENTIONS:  - Assess and monitor for signs and symptoms of infection  - Monitor lab/diagnostic results  - Monitor all insertion sites, i.e. indwelling lines, tubes, and drains  - Monitor endotracheal if appropriate and nasal secretions for changes in amount and color  - [place] appropriate cooling/warming therapies per order  - Administer medications as ordered  - Instruct and encourage patient and family to use good hand hygiene technique  - Identify and instruct in appropriate isolation precautions for identified infection/condition  Outcome: Progressing  Goal: Absence of fever/infection during neutropenic period  Description: INTERVENTIONS:  - Monitor WBC    Outcome: Progressing     Problem: SAFETY ADULT  Goal: Patient will remain free of falls  Description: INTERVENTIONS:  - Educate patient/family on patient safety including physical limitations  - Instruct patient to call for assistance with activity   - Consult OT/PT to assist with strengthening/mobility   - Keep Call bell within reach  - Keep bed low and locked with side rails adjusted as appropriate  - Keep care items and personal belongings within reach  - Initiate and maintain comfort rounds  - Make Fall Risk Sign visible to staff  - Offer Toileting every 2 Hours,  in advance of need  - Initiate/Maintain alarm  - Obtain necessary fall risk management equipment  - Apply yellow socks and bracelet for high fall risk patients  - Consider moving patient to room near nurses station  Outcome: Progressing  Goal: Maintain or return to baseline ADL function  Description: INTERVENTIONS:  -  Assess patient's ability to carry out ADLs; assess patient's baseline for ADL function and identify physical deficits which impact ability to perform ADLs (bathing, care of mouth/teeth, toileting, grooming, dressing, etc.)  - Assess/evaluate cause of self-care deficits   - Assess range of motion  - Assess patient's mobility; develop plan if impaired  - Assess patient's need for assistive devices and provide as appropriate  - Encourage maximum independence but intervene and supervise when necessary  - Involve family in performance of ADLs  - Assess for home care needs following discharge   - Consider OT consult to assist with ADL evaluation and planning for discharge  - Provide patient education as appropriate  Outcome: Progressing  Goal: Maintains/Returns to pre admission functional level  Description: INTERVENTIONS:  - Perform AM-PAC 6 Click Basic Mobility/ Daily Activity assessment daily.  - Set and communicate daily mobility goal to care team and patient/family/caregiver.   - Collaborate with rehabilitation services on mobility goals if consulted  - Perform Range of Motion 3 times a day.  - Reposition patient every 2 hours.  - Dangle patient 3 times a day  - Stand patient 3 times a day  - Ambulate patient 3 times a day  - Out of bed to chair 3 times a day   - Out of bed for meals 3 times a day  - Out of bed for toileting  - Record patient progress and toleration of activity level   Outcome: Progressing     Problem: DISCHARGE PLANNING  Goal: Discharge to home or other facility with appropriate resources  Description: INTERVENTIONS:  - Identify barriers to discharge w/patient and caregiver  - Arrange  for needed discharge resources and transportation as appropriate  - Identify discharge learning needs (meds, wound care, etc.)  - Arrange for interpretive services to assist at discharge as needed  - Refer to Case Management Department for coordinating discharge planning if the patient needs post-hospital services based on physician/advanced practitioner order or complex needs related to functional status, cognitive ability, or social support system  Outcome: Progressing     Problem: Knowledge Deficit  Goal: Patient/family/caregiver demonstrates understanding of disease process, treatment plan, medications, and discharge instructions  Description: Complete learning assessment and assess knowledge base.  Interventions:  - Provide teaching at level of understanding  - Provide teaching via preferred learning methods  Outcome: Progressing

## 2024-02-28 NOTE — ASSESSMENT & PLAN NOTE
"No results found for: \"HGBA1C\"    Recent Labs     02/27/24  1256 02/27/24  1727 02/27/24  1935 02/27/24  2130   POCGLU >500* >500* 362* 289*       Blood Sugar Average: Last 72 hrs:  (P) 325.5      33-year-old female patient presented to [facility] emergency room with complaining of 2 weeks duration of overall not feeling well, increased thirst, increased urination, dry mouth, generalized weakness, denied abdominal pain, nausea, vomiting on presentation.  Patient reports that since she was having a lot of thirst she end up taking lots of orange and apple juices.  Noted with Accu-Chek more than 500, BMP noted with sugar more than 1000.  ED states case was discussed with ICU and since patient acutely nontoxic-appearing, no GI symptoms, VBG without acidosis [facility].  Patient was started on insulin drip in ED.    UA negative for UTI.  CBC noted with leukocytosis  EKG noted with sinus tachycardia.  Serum pregnancy test negative.  D-dimer mildly elevated 0.92  CTA PE study report noted negative for PE, incidentally noted significant liver steatosis.    On encounter patient appears comfortable nondistressed.  Bicarb 15, elevated beta hydroxybutyrate however VBG noted with pH within normal limits.  Accu-Cheks improved to 201  VBG without acidosis.  Patient had received 3 L of normal saline.  Started on D5 water with 50 cc an hour, decrease insulin drip to 1U  Continue Accu-Cheks monitoring every 2 hours.  Discussed with nursing staff for second peripheral IV line for IV fluids.  Follow-up with A1c, lipid profile.  Monitor BMP every 8 hours.  Continue with IV fluids.  Follow-up with endocrine consult.            "

## 2024-02-28 NOTE — ASSESSMENT & PLAN NOTE
Initial BMP noted with sodium 147 which likely not to reflection since BMP glucose was more than thousand.  Corrected sodium around 161 based on initial BMP.    Improving, corrected Na now 148   Continue to monitor

## 2024-02-28 NOTE — PROGRESS NOTES
"[facility]  Progress Note  Name: Yu Ariza I  MRN: [id number]  Unit/Bed#: -02 I Date of Admission: 2/27/2024   Date of Service: 2/28/2024 I Hospital Day: 1    Assessment/Plan   Hypernatremia  Assessment & Plan  Initial BMP noted with sodium 147 which likely not to reflection since BMP glucose was more than thousand.  Corrected sodium around 161 based on initial BMP.    Improving, corrected Na now 148   Continue to monitor    Sinus tachycardia  Assessment & Plan  Noted with sinus tachycardia secondary to volume depletion  CTA PE study negative for PE.  Discontinue tele    Leukocytosis  Assessment & Plan  Likely reactive, improving    MILLIE (acute kidney injury) ([facility])  Assessment & Plan  Admission Cr 1.83  Prerenal secondary to glucosuria and volume depletion  Improved with IV hydration      * DKA (diabetic ketoacidosis) ([facility])  Assessment & Plan  No results found for: \"HGBA1C\"    Recent Labs     02/27/24  1727 02/27/24  1935 02/27/24  2130 02/27/24  2327   POCGLU >500* 362* 289* 201*         Blood Sugar Average: Last 72 hrs:  (P) 284      33-year-old female patient presented to [facility] emergency room with complaining of 2 weeks duration of overall not feeling well, increased thirst, increased urination, dry mouth, generalized weakness, denied abdominal pain, nausea, vomiting on presentation.  Patient reports that since she was having a lot of thirst she end up taking lots of orange and apple juices.  Noted with Accu-Chek more than 500, BMP noted with sugar more than 1000.  ED states case was discussed with ICU and since patient acutely nontoxic-appearing, no GI symptoms, VBG without acidosis recommended admission to MedSurg floor with insulin drip.  Patient was started on insulin drip in ED.    New diagnosis of DM  D-dimer mildly elevated 0.92  CTA PE study report noted negative for PE, incidentally noted significant liver steatosis.  Bicarb 15, elevated beta hydroxybutyrate " however VBG noted with pH within normal limits.  A1c 16%, lipid panel with elevated total cholesterol, LDL, triglycerides   Endocrine following  Off insulin drip, started 30u basal insulin with 10u premeal   Will need diabetic supplies on discharge           VTE Pharmacologic Prophylaxis: VTE Score: 1 Low Risk (Score 0-2) - Encourage Ambulation.    Mobility:   Basic Mobility Inpatient Raw Score: 18  JH-HLM Goal: 6: Walk 10 steps or more  JH-HLM Achieved: 6: Walk 10 steps or more  HLM Goal achieved. Continue to encourage appropriate mobility.    Patient Centered Rounds: I performed bedside rounds with nursing staff today.   Discussions with Specialists or Other Care Team Provider: Endocrinology    Education and Discussions with Family / Patient: Updated  (mother) at bedside.    Total Time Spent on Date of Encounter in care of patient: 50 mins. This time was spent on one or more of the following: performing physical exam; counseling and coordination of care; obtaining or reviewing history; documenting in the medical record; reviewing/ordering tests, medications or procedures; communicating with other healthcare professionals and discussing with patient's family/caregivers.    Current Length of Stay: 1 day(s)  Current Patient Status: Inpatient   Certification Statement: The patient will continue to require additional inpatient hospital stay due to DKA, new diabetes diagnosis  Discharge Plan: Anticipate discharge in 24-48 hrs to home.    Code Status: Level 1 - Full Code    Subjective:   Patient reports she feels better today.  Denies lightheadedness, dizziness, nausea.  No new symptoms overnight.    Objective:     Vitals:   Temp (24hrs), Av °F (36.7 °C), Min:98 °F (36.7 °C), Max:98 °F (36.7 °C)    Temp:  [98 °F (36.7 °C)] 98 °F (36.7 °C)  HR:  [] 103  Resp:  [16-39] 19  BP: (113-147)/(57-85) 136/77  SpO2:  [97 %-100 %] 97 %  Body mass index is 36.8 kg/m².     Input and Output Summary (last 24  hours):     Intake/Output Summary (Last 24 hours) at 2/28/2024 1334  Last data filed at 2/28/2024 1132  Gross per 24 hour   Intake 4014.58 ml   Output 1800 ml   Net 2214.58 ml       Physical Exam:   Physical Exam  Vitals and nursing note reviewed.   Constitutional:       Appearance: Normal appearance.   HENT:      Head: Normocephalic and atraumatic.      Mouth/Throat:      Mouth: Mucous membranes are moist.   Eyes:      Conjunctiva/sclera: Conjunctivae normal.      Pupils: Pupils are equal, round, and reactive to light.   Cardiovascular:      Rate and Rhythm: Normal rate and regular rhythm.      Pulses: Normal pulses.      Heart sounds: Normal heart sounds. No murmur heard.     No gallop.   Pulmonary:      Effort: Pulmonary effort is normal. No respiratory distress.      Breath sounds: Normal breath sounds. No wheezing or rales.   Abdominal:      General: Abdomen is flat. Bowel sounds are normal. There is no distension.      Palpations: Abdomen is soft.      Tenderness: There is no abdominal tenderness. There is no guarding or rebound.   Musculoskeletal:         General: No swelling. Normal range of motion.   Skin:     General: Skin is warm and dry.      Capillary Refill: Capillary refill takes less than 2 seconds.   Neurological:      General: No focal deficit present.      Mental Status: She is alert. Mental status is at baseline.   Psychiatric:         Mood and Affect: Mood normal.          Additional Data:     Labs:  Results from last 7 days   Lab Units 02/28/24  0541 02/27/24  1320   WBC Thousand/uL 11.37* 14.98*   HEMOGLOBIN g/dL 12.8 15.1   HEMATOCRIT % 38.9 46.2*   PLATELETS Thousands/uL 291 511*   NEUTROS PCT %  --  76*   LYMPHS PCT %  --  16   MONOS PCT %  --  8   EOS PCT %  --  0     Results from last 7 days   Lab Units 02/28/24  0541 02/27/24  2315 02/27/24  1320   SODIUM mmol/L 144   < > 147   POTASSIUM mmol/L 4.0   < > 5.3   CHLORIDE mmol/L 109*   < > 101   CO2 mmol/L 21   < > 15*   BUN mg/dL 23   < >  53*   CREATININE mg/dL 1.00   < > 1.82*   ANION GAP mmol/L 14   < > 31   CALCIUM mg/dL 8.4   < > 10.0   ALBUMIN g/dL  --   --  4.1   TOTAL BILIRUBIN mg/dL  --   --  0.51   ALK PHOS U/L  --   --  198*   ALT U/L  --   --  181*   AST U/L  --   --  89*   GLUCOSE RANDOM mg/dL 340*   < > 1,004*    < > = values in this interval not displayed.         Results from last 7 days   Lab Units 02/28/24  1105 02/28/24  0712 02/28/24  0513 02/28/24  0133 02/27/24  2327 02/27/24  2130 02/27/24  1935 02/27/24  1727 02/27/24  1256   POC GLUCOSE mg/dl 334* 305* 298* 277* 201* 289* 362* >500* >500*     Results from last 7 days   Lab Units 02/27/24  1320   HEMOGLOBIN A1C % 16.0*     Results from last 7 days   Lab Units 02/28/24  0541   PROCALCITONIN ng/ml 0.41*       Lines/Drains:  Invasive Devices       Peripheral Intravenous Line  Duration             Peripheral IV 02/28/24 Left;Proximal;Ventral (anterior) Forearm <1 day                          Imaging: Reviewed radiology reports from this admission including: chest xray and chest CT scan    Recent Cultures (last 7 days):         Last 24 Hours Medication List:   Current Facility-Administered Medications   Medication Dose Route Frequency Provider Last Rate    atorvastatin  40 mg Oral Daily With Dinner Martinez Lopez MD      heparin (porcine)  5,000 Units Subcutaneous Q8H [facility] Feng SOTO MD      insulin glargine  30 Units Subcutaneous QAM Martinez Lopez MD      insulin lispro  10 Units Subcutaneous TID With Meals Bridgett Elizondo MD      insulin lispro  2-12 Units Subcutaneous TID AC Suraj Peguero MD      insulin lispro  2-12 Units Subcutaneous HS Suraj Peguero MD      multi-electrolyte  75 mL/hr Intravenous Continuous Martinez Lopez MD 75 mL/hr (02/28/24 1132)    potassium chloride  40 mEq Oral BID Feng SOTO MD          Today, Patient Was Seen By: Martinez Lopez MD    **Please Note: This note may have been constructed using a voice recognition  system.**

## 2024-02-28 NOTE — H&P
"[facility]  H&P  Name: Yu Ariza 33 y.o. female I MRN: [id number]  Unit/Bed#: ED 20 I Date of Admission: 2/27/2024   Date of Service: 2/28/2024 I Hospital Day: 1      Assessment/Plan   * Hyperosmolar hyperglycemic state (HHS) (H[facility])  Assessment & Plan  No results found for: \"HGBA1C\"    Recent Labs     02/27/24  1727 02/27/24  1935 02/27/24  2130 02/27/24  2327   POCGLU >500* 362* 289* 201*         Blood Sugar Average: Last 72 hrs:  (P) 284      33-year-old female patient presented to [facility] emergency room with complaining of 2 weeks duration of overall not feeling well, increased thirst, increased urination, dry mouth, generalized weakness, denied abdominal pain, nausea, vomiting on presentation.  Patient reports that since she was having a lot of thirst she end up taking lots of orange and apple juices.  Noted with Accu-Chek more than 500, BMP noted with sugar more than 1000.  ED states case was discussed with ICU and since patient acutely nontoxic-appearing, no GI symptoms, VBG without acidosis recommended admission to MedSur floor with insulin drip.  Patient was started on insulin drip in ED.    UA negative for UTI.  CBC noted with leukocytosis  EKG noted with sinus tachycardia.  Serum pregnancy test negative.  D-dimer mildly elevated 0.92  CTA PE study report noted negative for PE, incidentally noted significant liver steatosis.    On encounter patient appears comfortable nondistressed.  Bicarb 15, elevated beta hydroxybutyrate however VBG noted with pH within normal limits.  Accu-Cheks improved to 201  VBG without acidosis.  Patient had received 3 L of normal saline.  Started on D5 water with 50 cc an hour, decrease insulin drip to 1U  Continue Accu-Cheks monitoring every 2 hours.  Discussed with nursing staff for second peripheral IV line for IV fluids.  Follow-up with A1c, lipid profile.  Monitor BMP every 6 hours.  Continue with IV fluids.  Follow-up with endocrine " consult.              Hypernatremia  Assessment & Plan  Initial BMP noted with sodium 147 which likely not to reflection since BMP glucose was more than thousand.  Corrected sodium around 161 based on initial BMP.    Repeat BMP noted with sodium 152 after glucose correction and mid 200s and after 3L fluids.  Total 8L deficit.  Currently on D5 water at 50 cc to prevent hypoglycemia.  Currently getting Plasma-Lyte 125 cc an hour.  Continue to monitor BMP every 6 hours.    Sinus tachycardia  Assessment & Plan  Noted with sinus tachycardia.  CTA PE study negative for PE.  Currently patient denies chest pain, palpitation, dyspnea, any other new complaints.  Had received 3 L of IV fluids.  Continue with aggressive IV hydration.  Continue telemetry monitoring for now.    Leukocytosis  Assessment & Plan  Likely reactive.  Monitor off antibiotics per  Follow-up with CBC in AM.    MILLIE (acute kidney injury) (HCC)  Assessment & Plan  BUN 53  Cr 1.83  Bicarb 15  VBG without acidosis.  No previous labs to compare baseline with.  Continue with aggressive hydration and monitor renal function.  Avoid nephrotoxic agents.         VTE Prophylaxis: Heparin    Code Status: Level 1 Full Code  Discussion with family: with mother at bedside.     Anticipated Length of Stay:  Patient will be admitted on an Inpatient basis with an anticipated length of stay of  > 2 midnights.   Justification for Hospital Stay: [facility]    Total Time for Visit, including Counseling / Coordination of Care: 90 minutes.  Greater than 50% of this total time spent on direct patient counseling and coordination of care.    Chief Complaint:   Dry mouth, increased thirst, increased urination, generalized weakness, poor p.o. intake,    History of Present Illness:    Yu Ariza is a 33 y.o. female patient past medical history of morbid obesity, who presents with 2-week duration of dry mouth, increased thirst, increased urination, poor p.o. intake, overall not feeling  well, generalized weakness.  Patient reports that she started drinking lots of water and juice, apple juice since she was feeling thirsty for last few days.  Patient reports blood sugar check at home was more than 600.  On further evaluation noted with elevated blood sugar more than 1000 on BMP and patient was started on insulin drip after discussion with ICU.  CBC glucose improving in low 200 range.  On encounter patient appears comfortable not in distress.  Currently she does report feeling slightly better since received 3 L of IV fluids.   Currently patient denies fever, chills, chest pain, dyspnea, cough, rhinorrhea, abdominal pain, nausea, vomiting, dysuria, diarrhea, hematuria, melena, hematochezia, any other new complaints. No other events reported.  Denies smoking, alcohol use, denies illicit drug use.  Mother at bedside reports her father was recently diagnosed with diabetes as well.    Review of Systems:    Review of Systems   Constitutional:  Positive for fatigue. Negative for chills, diaphoresis and fever.   HENT:  Negative for congestion.    Eyes:  Negative for visual disturbance.   Respiratory:  Negative for cough and shortness of breath.    Cardiovascular:  Negative for chest pain, palpitations and leg swelling.   Gastrointestinal:  Negative for abdominal pain, diarrhea, nausea and vomiting.   Endocrine: Positive for polydipsia and polyuria.   Genitourinary:  Positive for frequency. Negative for decreased urine volume, dysuria and urgency.   Musculoskeletal:  Negative for neck stiffness.   Neurological:  Positive for weakness.   Psychiatric/Behavioral:  Negative for agitation.        Past Medical and Surgical History:     History reviewed. No pertinent past medical history.    History reviewed. No pertinent surgical history.    Meds/Allergies:    Prior to Admission medications    Not on File     Not on any medication    Allergies: No Known Allergies    Social History:     Marital Status: /Civil  "[employer]     Substance Use History:   Social History     Substance and Sexual Activity   Alcohol Use Not Currently     Social History     Tobacco Use   Smoking Status Never   Smokeless Tobacco Never     Social History     Substance and Sexual Activity   Drug Use Not Currently       Family History:    Father recently diagnosed with diabetes.    Physical Exam:     Vitals:   Blood Pressure: 119/68 (02/27/24 1900)  Pulse: (!) 137 (02/27/24 1952)  Temperature: 98 °F (36.7 °C) (02/27/24 1952)  Temp Source: Temporal (02/27/24 1255)  Respirations: (!) 39 (02/27/24 1952)  Height: 5' 6\" (167.6 cm) (02/27/24 1258)  Weight - Scale: 103 kg (228 lb) (02/27/24 1258)  SpO2: 100 % (02/27/24 1952)    Physical Exam  Constitutional:       General: She is not in acute distress.     Appearance: Normal appearance. She is obese. She is not ill-appearing, toxic-appearing or diaphoretic.      Comments: Morbidly obese female patient, acutely nontoxic appearing.   HENT:      Head: Normocephalic and atraumatic.      Mouth/Throat:      Mouth: Mucous membranes are dry.   Eyes:      Pupils: Pupils are equal, round, and reactive to light.   Cardiovascular:      Rate and Rhythm: Regular rhythm. Tachycardia present.      Pulses: Normal pulses.   Pulmonary:      Effort: Pulmonary effort is normal. No respiratory distress.      Breath sounds: Normal breath sounds. No wheezing.   Abdominal:      General: Bowel sounds are normal. There is no distension.      Palpations: Abdomen is soft.      Tenderness: There is no abdominal tenderness.   Musculoskeletal:      Right lower leg: No edema.      Left lower leg: No edema.   Neurological:      Mental Status: She is alert and oriented to person, place, and time. Mental status is at baseline.   Psychiatric:         Mood and Affect: Mood normal.         Behavior: Behavior normal.         Additional Data:     Lab Results: I have personally reviewed pertinent reports.      Results from last 7 days   Lab Units " 02/27/24  1320   WBC Thousand/uL 14.98*   HEMOGLOBIN g/dL 15.1   HEMATOCRIT % 46.2*   PLATELETS Thousands/uL 511*   NEUTROS PCT % 76*   LYMPHS PCT % 16   MONOS PCT % 8   EOS PCT % 0     Results from last 7 days   Lab Units 02/27/24  2315 02/27/24  1320   SODIUM mmol/L 152* 147   POTASSIUM mmol/L 3.6 5.3   CHLORIDE mmol/L 115* 101   CO2 mmol/L 24 15*   BUN mg/dL 29* 53*   CREATININE mg/dL 1.18 1.82*   ANION GAP mmol/L 13 31   CALCIUM mg/dL 8.8 10.0   ALBUMIN g/dL  --  4.1   TOTAL BILIRUBIN mg/dL  --  0.51   ALK PHOS U/L  --  198*   ALT U/L  --  181*   AST U/L  --  89*   GLUCOSE RANDOM mg/dL 242* 1,004*         Results from last 7 days   Lab Units 02/27/24  2327 02/27/24  2130 02/27/24  1935 02/27/24  1727 02/27/24  1256   POC GLUCOSE mg/dl 201* 289* 362* >500* >500*               Imaging: I have personally reviewed pertinent reports.      CTA ED chest PE Study   Final Result by Elda Parikh MD (02/27 1705)      No pulmonary embolism.      No acute pulmonary pathology.      Significant steatosis in the visualized liver.                  Workstation performed: [id number]         XR chest 1 view portable   Final Result by John Summers MD (02/27 1961)      No acute cardiopulmonary disease.            Workstation performed: [id number]             EKG, Pathology, and Other Studies Reviewed on Admission:   EKG: sinus tachycardia        ** Please Note: This note has been constructed using a voice recognition system. **

## 2024-02-28 NOTE — UTILIZATION REVIEW
Initial Clinical Review    Admission: Date/Time/Statement:   Admission Orders (From admission, onward)       Ordered        02/27/24 1732  INPATIENT ADMISSION  Once                          Orders Placed This Encounter   Procedures    INPATIENT ADMISSION     Standing Status:   Standing     Number of Occurrences:   1     Order Specific Question:   Level of Care     Answer:   Med Surg [16]     Order Specific Question:   Estimated length of stay     Answer:   More than 2 Midnights     Order Specific Question:   Certification     Answer:   I certify that inpatient services are medically necessary for this patient for a duration of greater than two midnights. See H&P and MD Progress Notes for additional information about the patient's course of treatment.     ED Arrival Information       Expected   -    Arrival   2/27/2024 12:50    Acuity   Emergent              Means of arrival   Walk-In    Escorted by   Family Member    Service   Hospitalist    Admission type   Emergency              Arrival complaint   + and BP 80/50             Chief Complaint   Patient presents with    Hyperglycemia - Symptomatic     Fatigue, muscle cramps, thirst x 2 weeks. Blood sugar checked at home. Read over 600, no dx of DM. Blood pressure 80/50 at home.        Initial Presentation: 33 y.o. female , presented to the ED @ [facility], from home via walk in with family member.    Admitted as Inpatient due to Hyperosmolar Hyperglycemic State.    PMH:  morbid obesity   Date: 02/27/2024   Presents with 2-week duration of dry mouth, increased thirst, increased urination, poor p.o. intake, overall not feeling well, generalized weakness.  Patient reports that she started drinking lots of water and juice, apple juice since she was feeling thirsty for last few days.  Patient reports blood sugar check at home was more than 600.  On further evaluation noted with elevated blood sugar more than 1000 on BMP and patient was started on insulin drip after  discussion with ICU.  CBC glucose improving in low 200 range.  On encounter patient appears comfortable not in distress.  Currently she does report feeling slightly better since received 3 L of IV fluids.   Currently patient denies fever, chills, chest pain, dyspnea, cough, rhinorrhea, abdominal pain, nausea, vomiting, dysuria, diarrhea, hematuria, melena, hematochezia, any other new complaints. No other events reported.  Denies smoking, alcohol use, denies illicit drug use.  Mother at bedside reports her father was recently diagnosed with diabetes as well.   Cardio-Pulmonary monitoring.  Started on D5 water with 50 cc an hour, decrease insulin drip to 1U.  Continue Accu-Cheks monitoring every 2 hours.  BMP q6h.  Consult Endocrine.      Day 2: 02/28/2024  Telemetry.  BMP q6h.  IV flds.  Room Air.      02/28/2024  Consult Endocrine:  33-year-old female with new newly diagnosed diabetes, severe obesity, hypertriglyceridemia who presented to the hospital in DKA.  -Ketosis-prone type 2 diabetes versus type 1 diabetes  -She was switched to subcu insulin therapy last night however has not received any basal insulin as yet.  Advised to start Lantus 30 units once a day, increase Humalog to 10 units before meals.  Continue correctional insulin.  Monitor blood sugars before meals and bedtime and adjust accordingly.  Triglycerides should improve as glycemic control improves  -Check skyler, islet cell, zinc transporter antibodies.   She will need glucometer as well as insulin teaching  Outpatient follow-up with endocrinology 3 to 4 weeks after discharge.    For discharge, if Lantus solostar is not the preferred basal insulin for the patient's insurance company, please substitute with Tresiba flexpen, Basaglar Kwikpen , Levemir flexpen, Toujeo solostar pen  or equivalent insulin vials at the same dose instead.    For discharge, if Humalog  Kwik pen is not the preferred mealtime insulin for the patient's insurance, please substitute  with NovoLog flexpen, Fiasp  flextouch, Admelog solostar  or Apidra solostar pen or equivalent insulin vials at the same dose instead.    If basal/bolus therapy is not covered or affordable, please substitute with Novolin 70/30 Flexpen OR Novolin 70/30 vial at equivalent  dose.   Please prescribe insulin pen needles, glucometer, test strips, lancets and insulin syringes (only when using insulin vials) on discharge.      ED Triage Vitals   Temperature Pulse Respirations Blood Pressure SpO2   02/27/24 1255 02/27/24 1258 02/27/24 1258 02/27/24 1258 02/27/24 1258   97.9 °F (36.6 °C) (!) 147 18 116/74 98 %      Temp Source Heart Rate Source Patient Position - Orthostatic VS BP Location FiO2 (%)   02/27/24 1255 02/27/24 1430 02/27/24 1258 02/27/24 1258 --   Temporal Monitor Sitting Left arm       Pain Score       02/28/24 0000       No Pain          Wt Readings from Last 1 Encounters:   02/27/24 103 kg (228 lb)     Additional Vital Signs:   Date/Time Temp Pulse Resp BP MAP (mmHg) SpO2 O2 Device Patient Position - Orthostatic VS   02/28/24 1200 -- 108 Abnormal  21 118/79 95 100 % -- --   02/28/24 1100 -- 104 17 117/57 78 98 % -- --   02/28/24 1000 -- 99 21 117/73 89 97 % -- Lying   02/28/24 0930 -- 114 Abnormal  22 113/64 81 97 % -- Lying   02/28/24 0830 -- 111 Abnormal  -- 139/67 97 99 % -- Lying   02/28/24 0800 -- 105 20 136/70 94 100 % -- --   02/28/24 0730 -- 108 Abnormal  18 147/72 97 98 % -- Lying   02/28/24 0715 -- 109 Abnormal  -- 124/74 92 98 % -- Lying   02/28/24 0713 -- 105 19 124/74 92 98 % -- Lying   02/28/24 0531 -- 103 -- 134/68 -- 100 % None (Room air) Lying   02/28/24 0100 -- 116 Abnormal  20 -- -- 99 % None (Room air) --   02/28/24 0000 -- -- -- -- -- -- None (Room air) --   02/27/24 1952 98 °F (36.7 °C) 137 Abnormal  39 Abnormal  -- -- 100 % -- --   02/27/24 1900 -- 133 Abnormal  16 119/68 87 99 % None (Room air) Lying   02/27/24 1730 -- 139 Abnormal  17 125/85 97 100 % None (Room air) Lying   02/27/24  1700 -- 135 Abnormal  16 129/79 99 100 % None (Room air) Lying   02/27/24 1645 -- 133 Abnormal  16 120/71 91 100 % None (Room air) Lying   02/27/24 1600 -- 132 Abnormal  16 123/58 84 100 % None (Room air) Lying   02/27/24 1500 -- 127 Abnormal  19 124/67 91 100 % None (Room air) Lying   02/27/24 1430 -- 139 Abnormal  18 116/76 90 99 % None (Room air) Lying   02/27/24 1400 -- 142 Abnormal  -- 125/74 94 98 % None (Room air) Lying     Date and Time Eye Opening Best Verbal Response Best Motor Response Pullman Coma Scale Score   02/28/24 0000 4 5 6 15   02/27/24 1330 4 5 6 15         Pertinent Labs/Diagnostic Test Results:   CTA ED chest PE Study   Final Result by Elda Parikh MD (02/27 1705)   No pulmonary embolism.      No acute pulmonary pathology.      Significant steatosis in the visualized liver.      XR chest 1 view portable   Final Result by John Summers MD (02/27 1555)   No acute cardiopulmonary disease.        Results from last 7 days   Lab Units 02/27/24  1326   SARS-COV-2  Negative     Results from last 7 days   Lab Units 02/28/24  0541 02/27/24  1320   WBC Thousand/uL 11.37* 14.98*   HEMOGLOBIN g/dL 12.8 15.1   HEMATOCRIT % 38.9 46.2*   PLATELETS Thousands/uL 291 511*   NEUTROS ABS Thousands/µL  --  11.33*     Results from last 7 days   Lab Units 02/28/24  0541 02/27/24  2315 02/27/24  1320   SODIUM mmol/L 144 152* 147   POTASSIUM mmol/L 4.0 3.6 5.3   CHLORIDE mmol/L 109* 115* 101   CO2 mmol/L 21 24 15*   ANION GAP mmol/L 14 13 31   BUN mg/dL 23 29* 53*   CREATININE mg/dL 1.00 1.18 1.82*   EGFR ml/min/1.73sq m 75 61 36   CALCIUM mg/dL 8.4 8.8 10.0   CALCIUM, IONIZED mmol/L  --  1.14  --    MAGNESIUM mg/dL  --  2.4  --    PHOSPHORUS mg/dL  --  2.1*  --      Results from last 7 days   Lab Units 02/27/24  1320   AST U/L 89*   ALT U/L 181*   ALK PHOS U/L 198*   TOTAL PROTEIN g/dL 8.0   ALBUMIN g/dL 4.1   TOTAL BILIRUBIN mg/dL 0.51     Results from last 7 days   Lab Units 02/28/24  1105 02/28/24  0712  02/28/24  0513 02/28/24  0133 02/27/24  2327 02/27/24  2130 02/27/24  1935 02/27/24  1727 02/27/24  1256   POC GLUCOSE mg/dl 334* 305* 298* 277* 201* 289* 362* >500* >500*     Results from last 7 days   Lab Units 02/28/24  0541 02/27/24  2315 02/27/24  1320   GLUCOSE RANDOM mg/dL 340* 242* 1,004*     Results from last 7 days   Lab Units 02/27/24  1320   HEMOGLOBIN A1C % 16.0*   EAG mg/dl 413     Beta- Hydroxybutyrate   Date Value Ref Range Status   02/27/2024 11.12 (H) 0.02 - 0.27 mmol/L Final      Results from last 7 days   Lab Units 02/27/24  2315 02/27/24  1410   PH MANFRED  7.397 7.307   PCO2 MANFRED mm Hg 39.8* 29.6*   PO2 MANFRED mm Hg 33.5* 41.6   HCO3 MANFRED mmol/L 23.9* 14.5*   BASE EXC MANFRED mmol/L -0.8 -10.3   O2 CONTENT MANFRED ml/dL 14.7 16.2   O2 HGB, VENOUS % 70.0 75.5     Results from last 7 days   Lab Units 02/27/24  1940 02/27/24  1539 02/27/24  1326   HS TNI 0HR ng/L  --   --  19   HS TNI 2HR ng/L  --  28  --    HSTNI D2 ng/L  --  9  --    HS TNI 4HR ng/L 47  --   --    HSTNI D4 ng/L 28*  --   --      Results from last 7 days   Lab Units 02/27/24  1326   D-DIMER QUANTITATIVE ug/ml FEU 0.92*     Results from last 7 days   Lab Units 02/28/24  0541   PROCALCITONIN ng/ml 0.41*     Results from last 7 days   Lab Units 02/27/24  1352   CLARITY UA  Clear   COLOR UA  Light Yellow   SPEC GRAV UA  1.031*   PH UA  5.0   GLUCOSE UA mg/dl >=1000 (1%)*   KETONES UA mg/dl 40 (2+)*   BLOOD UA  Large*   PROTEIN UA mg/dl Trace*   NITRITE UA  Negative   BILIRUBIN UA  Negative   UROBILINOGEN UA (BE) mg/dl <2.0   LEUKOCYTES UA  Elevated glucose may cause decreased leukocyte values. See urine microscopic for UWBC result*   WBC UA /hpf 1-2   RBC UA /hpf 1-2   BACTERIA UA /hpf Moderate*   EPITHELIAL CELLS WET PREP /hpf Occasional   MUCUS THREADS  Occasional*     Results from last 7 days   Lab Units 02/27/24  1326   INFLUENZA A PCR  Negative   INFLUENZA B PCR  Negative   RSV PCR  Negative     ED Treatment:   Medication Administration from  02/27/2024 1250 to 02/28/2024 1246         Date/Time Order Dose Route Action     02/27/2024 1324 EST sodium chloride 0.9 % bolus 2,000 mL 2,000 mL Intravenous New Bag     02/28/2024 0141 EST insulin regular (HumuLIN R,NovoLIN R) 1 Units/mL in sodium chloride 0.9 % 100 mL infusion 7 Units/hr Intravenous Rate/Dose Change     02/27/2024 1650 EST insulin regular (HumuLIN R,NovoLIN R) 1 Units/mL in sodium chloride 0.9 % 100 mL infusion 10 Units/hr Intravenous New Bag     02/27/2024 1629 EST iohexol (OMNIPAQUE) 350 MG/ML injection (MULTI-DOSE) 85 mL 85 mL Intravenous Given     02/27/2024 1800 EST sodium chloride 0.9 % bolus 1,000 mL 1,000 mL Intravenous New Bag     02/27/2024 2317 EST multi-electrolyte (PLASMALYTE-A/ISOLYTE-S PH 7.4) IV solution 125 mL/hr Intravenous New Bag     02/27/2024 2354 EST dextrose 5 % infusion 50 mL/hr Intravenous New Bag     02/28/2024 0520 EST heparin (porcine) subcutaneous injection 5,000 Units 5,000 Units Subcutaneous Given     02/27/2024 2354 EST heparin (porcine) subcutaneous injection 5,000 Units 5,000 Units Subcutaneous Given     02/28/2024 0840 EST potassium chloride (Klor-Con M20) CR tablet 40 mEq 40 mEq Oral Given     02/28/2024 0021 EST potassium chloride (Klor-Con M20) CR tablet 40 mEq 40 mEq Oral Given     02/28/2024 0325 EST sodium chloride 0.9 % infusion 125 mL/hr Intravenous New Bag     02/28/2024 1114 EST insulin lispro (HumALOG/ADMELOG) 100 units/mL subcutaneous injection 2-12 Units 8 Units Subcutaneous Given     02/28/2024 0607 EST insulin lispro (HumALOG/ADMELOG) 100 units/mL subcutaneous injection 2-12 Units 6 Units Subcutaneous Given     02/28/2024 1227 EST insulin glargine (LANTUS) subcutaneous injection 30 Units 0.3 mL 30 Units Subcutaneous Given     02/28/2024 1223 EST insulin lispro (HumALOG/ADMELOG) 100 units/mL subcutaneous injection 10 Units 10 Units Subcutaneous Given          History reviewed. No pertinent past medical history.    Admitting Diagnosis:  Hyperglycemia [R73.9]  Age/Sex: 33 y.o. female  Admission Orders:  Omid/CHO controlled diet  Up & OOB as tolerated  Fingerstick Glucose q2h    Scheduled Medications:  atorvastatin, 40 mg, Oral, Daily With Dinner  heparin (porcine), 5,000 Units, Subcutaneous, Q8H JOSÉ ANTONIO  insulin glargine, 30 Units, Subcutaneous, QAM  insulin lispro, 10 Units, Subcutaneous, TID With Meals  insulin lispro, 2-12 Units, Subcutaneous, TID AC  insulin lispro, 2-12 Units, Subcutaneous, HS  potassium chloride, 40 mEq, Oral, BID      Continuous IV Infusions:  multi-electrolyte, 75 mL/hr, Intravenous, Continuous  dextrose 5 % infusion  Rate: 50 mL/hr Dose: 50 mL/hr  Freq: Continuous Route: IV  Last Dose: Stopped (02/28/24 0315)  Start: 02/27/24 2345 End: 02/28/24 0246   insulin regular (HumuLIN R,NovoLIN R) 1 Units/mL in sodium chloride 0.9 % 100 mL infusion  Rate: 1 mL/hr Dose: 1 Units/hr  Freq: Titrated Route: IV  Last Dose: Stopped (02/28/24 0315)  Start: 02/27/24 1630 End: 02/28/24 0247   sodium chloride 0.9 % infusion  Rate: 125 mL/hr Dose: 125 mL/hr  Freq: Continuous Route: IV  Indications of Use: IV Hydration  Last Dose: Stopped (02/28/24 1132)  Start: 02/28/24 0300 End: 02/28/24 1128     PRN Meds:       IP CONSULT TO ENDOCRINOLOGY  IP CONSULT TO NUTRITION SERVICES    Network Utilization Review Department  ATTENTION: Please call with any questions or concerns to [phone] and carefully listen to the prompts so that you are directed to the right person. All voicemails are confidential.   For Discharge needs, contact Care Management DC Support Team at [phone] opt. [phone]  Send all requests for admission clinical reviews, approved or denied determinations and any other requests to dedicated fax number below belonging to the campus where the patient is receiving treatment. List of dedicated fax numbers for the Facilities:  FACILITY NAME UR FAX NUMBER   ADMISSION DENIALS (Administrative/Medical Necessity) [phone]   DISCHARGE SUPPORT TEAM  (NETWORK) [phone]   PARENT CHILD HEALTH (Maternity/NICU/Pediatrics) [phone]   [facility] [phone]   [facility] [phone]   [facility] [phone]   [facility] [phone]   [facility] [phone]   [facility] [phone]   [facility] [phone]   [facility] [phone]   [facility] [phone]   [facility] [phone]   [facility] [phone]   [facility] [phone]

## 2024-02-28 NOTE — TELEMEDICINE
REQUIRED DOCUMENTATION:     1. This service was provided via Telemedicine.  2. Provider located at [facility] [place]  3. TeleMed provider: Bridgett Elizondo MD.  4. Identify all parties in room with patient during tele consult:  Family member  5.Patient was then informed that this was a Telemedicine visit and that the exam was being conducted confidentially over secure lines. My office door was closed. No one else was in the room.  Patient acknowledged consent and understanding of privacy and security of the Telemedicine visit, and gave us permission to have the assistant stay in the room in order to assist with the history and to conduct the exam.  I informed the patient that I have reviewed their record in Epic and presented the opportunity for them to ask any questions regarding the visit today.  The patient agreed to participate.        Consultation - Yu Ariza 33 y.o. female MRN: [id number]    Unit/Bed#: ED 20 Encounter: [id number]      Assessment/Plan   33-year-old female with new newly diagnosed diabetes, severe obesity, hypertriglyceridemia who presented to the hospital in DKA.  -Ketosis-prone type 2 diabetes versus type 1 diabetes  -She was switched to subcu insulin therapy last night however has not received any basal insulin as yet.  Advised to start Lantus 30 units once a day, increase Humalog to 10 units before meals.  Continue correctional insulin.  Monitor blood sugars before meals and bedtime and adjust accordingly.  Triglycerides should improve as glycemic control improves  -Check skyler, islet cell, zinc transporter antibodies    She will need glucometer as well as insulin teaching  Outpatient follow-up with endocrinology 3 to 4 weeks after discharge     For discharge, if Lantus solostar is not the preferred basal insulin for the patient's insurance company, please substitute with Tresiba flexpen, Basaglar Kwikpen , Levemir flexpen, Toujeo solostar pen  or equivalent  insulin vials at the same dose instead.      For discharge, if Humalog  Kwik pen is not the preferred mealtime insulin for the patient's insurance, please substitute with NovoLog flexpen, Fiasp  flextouch, Admelog solostar  or Apidra solostar pen or equivalent insulin vials at the same dose instead.     If basal/bolus therapy is not covered or affordable, please substitute with Novolin 70/30 Flexpen OR Novolin 70/30 vial at equivalent  dose     Please prescribe insulin pen needles, glucometer, test strips, lancets and insulin syringes (only when using insulin vials) on discharge.      Plan discussed with primary team    CC: Diabetes Consult    History of Present Illness     HPI: Yu Ariza is a 33 y.o. year old female with who presented to the hospital with symptoms of dry mouth , polyuria , polydipsia , weight loss -25 lbs in the past few weeks-on admission she was found to be in DKA and was started on the DKA protocol.  Endocrine consultation is requested for new onset diabetes and DKA  Patient states that she started feeling unwell for the past few weeks, had dry mouth, excessive thirst and urination, unintentional weight loss of 25 pounds.  She does not recall being diagnosed with diabetes in the past however has not seen a physician in many years  Denies any fever, chills, dysuria, cough shortness of breath  Menstrual cycles  regular      Inpatient consult to Endocrinology  Consult performed by: Bridgett Elizondo MD  Consult ordered by: Feng SOTO MD          Review of Systems    Historical Information   History reviewed. No pertinent past medical history.  History reviewed. No pertinent surgical history.  Social History   Social History     Substance and Sexual Activity   Alcohol Use Not Currently     Social History     Substance and Sexual Activity   Drug Use Not Currently     Social History     Tobacco Use   Smoking Status Never   Smokeless Tobacco Never     Family History: FH of diabetes -father  "diagnosed with type 2 diabetes 2 years back    Meds/Allergies   Current Facility-Administered Medications   Medication Dose Route Frequency Provider Last Rate Last Admin    atorvastatin (LIPITOR) tablet 40 mg  40 mg Oral Daily With Dinner Martinez Lopez MD        heparin (porcine) subcutaneous injection 5,000 Units  5,000 Units Subcutaneous Q8H [facility] Feng SOTO MD   5,000 Units at 02/28/24 0520    insulin glargine (LANTUS) subcutaneous injection 25 Units 0.25 mL  25 Units Subcutaneous HS Martinez Lopez MD        insulin lispro (HumALOG/ADMELOG) 100 units/mL subcutaneous injection 2-12 Units  2-12 Units Subcutaneous TID AC Suraj Peguero MD   8 Units at 02/28/24 1114    insulin lispro (HumALOG/ADMELOG) 100 units/mL subcutaneous injection 2-12 Units  2-12 Units Subcutaneous HS Suraj Peguero MD        [START ON 2/29/2024] insulin lispro (HumALOG/ADMELOG) 100 units/mL subcutaneous injection 8 Units  8 Units Subcutaneous Daily With Breakfast Martinez Lopez MD        multi-electrolyte (PLASMALYTE-A/ISOLYTE-S PH 7.4) IV solution  75 mL/hr Intravenous Continuous Martinez Lopez MD 75 mL/hr at 02/28/24 1132 75 mL/hr at 02/28/24 1132    potassium chloride (Klor-Con M20) CR tablet 40 mEq  40 mEq Oral BID Feng SOTO MD   40 mEq at 02/28/24 0840     No current outpatient medications on file.     No Known Allergies    Objective   Vitals: Blood pressure 117/57, pulse 104, temperature 98 °F (36.7 °C), resp. rate 17, height 5' 6\" (1.676 m), weight 103 kg (228 lb), last menstrual period 02/27/2024, SpO2 98%.    Intake/Output Summary (Last 24 hours) at 2/28/2024 1137  Last data filed at 2/28/2024 1132  Gross per 24 hour   Intake 4014.58 ml   Output 1800 ml   Net 2214.58 ml     Invasive Devices       Peripheral Intravenous Line  Duration             Peripheral IV 02/27/24 Right Antecubital <1 day    Peripheral IV 02/28/24 Left;Proximal;Ventral (anterior) Forearm <1 day                    Physical " "Exam  Constitutional:       General: She is not in acute distress.     Appearance: Normal appearance. She is obese. She is not ill-appearing, toxic-appearing or diaphoretic.   HENT:      Head: Normocephalic and atraumatic.   Pulmonary:      Effort: Pulmonary effort is normal. No respiratory distress.   Musculoskeletal:      Cervical back: Neck supple.   Neurological:      General: No focal deficit present.      Mental Status: She is alert and oriented to person, place, and time.   Psychiatric:         Mood and Affect: Mood normal.         Behavior: Behavior normal.         Thought Content: Thought content normal.         Judgment: Judgment normal.         The history was obtained from the review of the chart, patient and family.    Lab Results:   Results from last 7 days   Lab Units 02/27/24  1320   HEMOGLOBIN A1C % 16.0*     Lab Results   Component Value Date    WBC 11.37 (H) 02/28/2024    HGB 12.8 02/28/2024    HCT 38.9 02/28/2024    MCV 87 02/28/2024     02/28/2024     Lab Results   Component Value Date/Time    BUN 23 02/28/2024 05:41 AM    K 4.0 02/28/2024 05:41 AM     (H) 02/28/2024 05:41 AM    CO2 21 02/28/2024 05:41 AM    CREATININE 1.00 02/28/2024 05:41 AM    AST 89 (H) 02/27/2024 01:20 PM     (H) 02/27/2024 01:20 PM    TP 8.0 02/27/2024 01:20 PM    ALB 4.1 02/27/2024 01:20 PM     Recent Labs     02/28/24  0541   HDL 32*   TRIG 373*     No results found for: \"MICROALBUR\", \"[id number]\"  POC Glucose (mg/dl)   Date Value   02/28/2024 334 (H)   02/28/2024 305 (H)   02/28/2024 298 (H)   02/28/2024 277 (H)   02/27/2024 201 (H)   02/27/2024 289 (H)   02/27/2024 362 (H)   02/27/2024 >500 (HH)   02/27/2024 >500 (HH)       Imaging Studies: I have personally reviewed pertinent reports.      Portions of the record may have been created with voice recognition software.   "

## 2024-02-28 NOTE — ASSESSMENT & PLAN NOTE
Admission Cr 1.83  Prerenal secondary to glucosuria and volume depletion  Improved with IV hydration

## 2024-02-28 NOTE — ASSESSMENT & PLAN NOTE
Noted with sinus tachycardia.  CTA PE study negative for PE.  Currently patient denies chest pain, palpitation, dyspnea, any other new complaints.  Had received 3 L of IV fluids.  Continue with aggressive IV hydration.  Continue telemetry monitoring for now.

## 2024-02-28 NOTE — ASSESSMENT & PLAN NOTE
BUN 53  Cr 1.83  Bicarb 15  VBG without acidosis.  No previous labs to compare baseline with.  Continue with aggressive hydration and monitor renal function.  Avoid nephrotoxic agents.

## 2024-02-28 NOTE — ASSESSMENT & PLAN NOTE
"No results found for: \"HGBA1C\"    Recent Labs     02/27/24  1727 02/27/24  1935 02/27/24  2130 02/27/24  2327   POCGLU >500* 362* 289* 201*         Blood Sugar Average: Last 72 hrs:  (P) 284      33-year-old female patient presented to [facility] emergency room with complaining of 2 weeks duration of overall not feeling well, increased thirst, increased urination, dry mouth, generalized weakness, denied abdominal pain, nausea, vomiting on presentation.  Patient reports that since she was having a lot of thirst she end up taking lots of orange and apple juices.  Noted with Accu-Chek more than 500, BMP noted with sugar more than 1000.  ED states case was discussed with ICU and since patient acutely nontoxic-appearing, no GI symptoms, VBG without acidosis recommended admission to MedSurg floor with insulin drip.  Patient was started on insulin drip in ED.    New diagnosis of DM  D-dimer mildly elevated 0.92  CTA PE study report noted negative for PE, incidentally noted significant liver steatosis.  Bicarb 15, elevated beta hydroxybutyrate however VBG noted with pH within normal limits.  A1c 16%, lipid panel with elevated total cholesterol, LDL, triglycerides   Endocrine following  Off insulin drip, started 30u basal insulin with 10u premeal   Will need diabetic supplies on discharge  "

## 2024-02-29 VITALS
OXYGEN SATURATION: 96 % | HEART RATE: 99 BPM | TEMPERATURE: 98 F | DIASTOLIC BLOOD PRESSURE: 80 MMHG | SYSTOLIC BLOOD PRESSURE: 130 MMHG | RESPIRATION RATE: 18 BRPM | WEIGHT: 228 LBS | BODY MASS INDEX: 36.64 KG/M2 | HEIGHT: 66 IN

## 2024-02-29 PROBLEM — N17.9 AKI (ACUTE KIDNEY INJURY) (HCC): Status: RESOLVED | Noted: 2024-02-27 | Resolved: 2024-02-29

## 2024-02-29 LAB
ANION GAP SERPL CALCULATED.3IONS-SCNC: 10 MMOL/L
BUN SERPL-MCNC: 12 MG/DL (ref 5–25)
CALCIUM SERPL-MCNC: 8.9 MG/DL (ref 8.4–10.2)
CHLORIDE SERPL-SCNC: 105 MMOL/L (ref 96–108)
CO2 SERPL-SCNC: 25 MMOL/L (ref 21–32)
CREAT SERPL-MCNC: 0.59 MG/DL (ref 0.6–1.3)
ERYTHROCYTE [DISTWIDTH] IN BLOOD BY AUTOMATED COUNT: 12 % (ref 11.6–15.1)
GFR SERPL CREATININE-BSD FRML MDRD: 122 ML/MIN/1.73SQ M
GLUCOSE SERPL-MCNC: 225 MG/DL (ref 65–140)
GLUCOSE SERPL-MCNC: 251 MG/DL (ref 65–140)
GLUCOSE SERPL-MCNC: 269 MG/DL (ref 65–140)
GLUCOSE SERPL-MCNC: 278 MG/DL (ref 65–140)
GLUCOSE SERPL-MCNC: 333 MG/DL (ref 65–140)
HCT VFR BLD AUTO: 39 % (ref 34.8–46.1)
HGB BLD-MCNC: 13.2 G/DL (ref 11.5–15.4)
MCH RBC QN AUTO: 29.1 PG (ref 26.8–34.3)
MCHC RBC AUTO-ENTMCNC: 33.8 G/DL (ref 31.4–37.4)
MCV RBC AUTO: 86 FL (ref 82–98)
PLATELET # BLD AUTO: 249 THOUSANDS/UL (ref 149–390)
PMV BLD AUTO: 11.8 FL (ref 8.9–12.7)
POTASSIUM SERPL-SCNC: 4.4 MMOL/L (ref 3.5–5.3)
RBC # BLD AUTO: 4.53 MILLION/UL (ref 3.81–5.12)
SODIUM SERPL-SCNC: 140 MMOL/L (ref 135–147)
TSH SERPL DL<=0.05 MIU/L-ACNC: 1.19 UIU/ML (ref 0.45–4.5)
WBC # BLD AUTO: 8.21 THOUSAND/UL (ref 4.31–10.16)

## 2024-02-29 PROCEDURE — 99239 HOSP IP/OBS DSCHRG MGMT >30: CPT | Performed by: STUDENT IN AN ORGANIZED HEALTH CARE EDUCATION/TRAINING PROGRAM

## 2024-02-29 PROCEDURE — 80048 BASIC METABOLIC PNL TOTAL CA: CPT | Performed by: STUDENT IN AN ORGANIZED HEALTH CARE EDUCATION/TRAINING PROGRAM

## 2024-02-29 PROCEDURE — 84443 ASSAY THYROID STIM HORMONE: CPT | Performed by: STUDENT IN AN ORGANIZED HEALTH CARE EDUCATION/TRAINING PROGRAM

## 2024-02-29 PROCEDURE — 86341 ISLET CELL ANTIBODY: CPT | Performed by: STUDENT IN AN ORGANIZED HEALTH CARE EDUCATION/TRAINING PROGRAM

## 2024-02-29 PROCEDURE — 82948 REAGENT STRIP/BLOOD GLUCOSE: CPT

## 2024-02-29 PROCEDURE — 83519 RIA NONANTIBODY: CPT | Performed by: STUDENT IN AN ORGANIZED HEALTH CARE EDUCATION/TRAINING PROGRAM

## 2024-02-29 PROCEDURE — 85027 COMPLETE CBC AUTOMATED: CPT | Performed by: STUDENT IN AN ORGANIZED HEALTH CARE EDUCATION/TRAINING PROGRAM

## 2024-02-29 RX ORDER — INSULIN LISPRO 100 [IU]/ML
2-12 INJECTION, SOLUTION INTRAVENOUS; SUBCUTANEOUS
Qty: 10 ML | Refills: 0 | Status: SHIPPED | OUTPATIENT
Start: 2024-02-29 | End: 2024-05-15 | Stop reason: SDUPTHER

## 2024-02-29 RX ORDER — GLUCOSAMINE HCL/CHONDROITIN SU 500-400 MG
CAPSULE ORAL
Qty: 200 EACH | Refills: 0 | Status: SHIPPED | OUTPATIENT
Start: 2024-02-29

## 2024-02-29 RX ORDER — INSULIN GLARGINE 100 [IU]/ML
30 INJECTION, SOLUTION SUBCUTANEOUS
Qty: 15 ML | Refills: 0 | Status: SHIPPED | OUTPATIENT
Start: 2024-02-29 | End: 2024-02-29

## 2024-02-29 RX ORDER — BLOOD SUGAR DIAGNOSTIC
STRIP MISCELLANEOUS
Qty: 200 EACH | Refills: 0 | Status: SHIPPED | OUTPATIENT
Start: 2024-02-29

## 2024-02-29 RX ORDER — GLUCAGON 1 MG
1 KIT INJECTION DAILY PRN
Qty: 1 EACH | Refills: 0 | Status: SHIPPED | OUTPATIENT
Start: 2024-02-29 | End: 2024-03-26 | Stop reason: SDUPTHER

## 2024-02-29 RX ORDER — LANCETS 33 GAUGE
EACH MISCELLANEOUS
Qty: 200 EACH | Refills: 0 | Status: SHIPPED | OUTPATIENT
Start: 2024-02-29

## 2024-02-29 RX ORDER — PEN NEEDLE, DIABETIC 32GX 5/32"
NEEDLE, DISPOSABLE MISCELLANEOUS
Qty: 100 EACH | Refills: 0 | Status: SHIPPED | OUTPATIENT
Start: 2024-02-29 | End: 2024-05-24 | Stop reason: SDUPTHER

## 2024-02-29 RX ORDER — ATORVASTATIN CALCIUM 40 MG/1
40 TABLET, FILM COATED ORAL
Qty: 30 TABLET | Refills: 0 | Status: SHIPPED | OUTPATIENT
Start: 2024-02-29 | End: 2024-03-26 | Stop reason: SDUPTHER

## 2024-02-29 RX ORDER — PEN NEEDLE, DIABETIC 32GX 5/32"
NEEDLE, DISPOSABLE MISCELLANEOUS
Qty: 100 EACH | Refills: 0 | Status: SHIPPED | OUTPATIENT
Start: 2024-02-29 | End: 2024-03-26 | Stop reason: SDUPTHER

## 2024-02-29 RX ORDER — BLOOD-GLUCOSE METER
KIT MISCELLANEOUS
Qty: 1 KIT | Refills: 0 | Status: SHIPPED | OUTPATIENT
Start: 2024-02-29

## 2024-02-29 RX ORDER — INSULIN LISPRO 100 [IU]/ML
10 INJECTION, SOLUTION INTRAVENOUS; SUBCUTANEOUS
Qty: 15 ML | Refills: 0 | Status: SHIPPED | OUTPATIENT
Start: 2024-02-29 | End: 2024-03-26 | Stop reason: DRUGHIGH

## 2024-02-29 RX ORDER — INSULIN GLARGINE 100 [IU]/ML
36 INJECTION, SOLUTION SUBCUTANEOUS
Qty: 15 ML | Refills: 0 | Status: SHIPPED | OUTPATIENT
Start: 2024-02-29 | End: 2024-05-15 | Stop reason: SDUPTHER

## 2024-02-29 RX ADMIN — INSULIN LISPRO 10 UNITS: 100 INJECTION, SOLUTION INTRAVENOUS; SUBCUTANEOUS at 12:12

## 2024-02-29 RX ADMIN — INSULIN GLARGINE 30 UNITS: 100 INJECTION, SOLUTION SUBCUTANEOUS at 08:32

## 2024-02-29 RX ADMIN — INSULIN LISPRO 8 UNITS: 100 INJECTION, SOLUTION INTRAVENOUS; SUBCUTANEOUS at 08:34

## 2024-02-29 RX ADMIN — INSULIN LISPRO 10 UNITS: 100 INJECTION, SOLUTION INTRAVENOUS; SUBCUTANEOUS at 08:35

## 2024-02-29 RX ADMIN — SODIUM CHLORIDE, SODIUM GLUCONATE, SODIUM ACETATE, POTASSIUM CHLORIDE, MAGNESIUM CHLORIDE, SODIUM PHOSPHATE, DIBASIC, AND POTASSIUM PHOSPHATE 75 ML/HR: .53; .5; .37; .037; .03; .012; .00082 INJECTION, SOLUTION INTRAVENOUS at 01:37

## 2024-02-29 RX ADMIN — HEPARIN SODIUM 5000 UNITS: 5000 INJECTION INTRAVENOUS; SUBCUTANEOUS at 05:00

## 2024-02-29 RX ADMIN — INSULIN LISPRO 6 UNITS: 100 INJECTION, SOLUTION INTRAVENOUS; SUBCUTANEOUS at 12:12

## 2024-02-29 NOTE — CASE MANAGEMENT
Case Management Assessment & Discharge Planning Note    Patient name Yu Ariza  Location /-02 MRN [id number]  : 1990 Date 2024       Current Admission Date: 2024  Current Admission Diagnosis:DKA (diabetic ketoacidosis) (HCC)   Patient Active Problem List    Diagnosis Date Noted    DKA (diabetic ketoacidosis) (HCC) 2024    Leukocytosis 2024    Sinus tachycardia 2024    Hypernatremia 2024      LOS (days): 2  Geometric Mean LOS (GMLOS) (days): 4.4  Days to GMLOS:2.5     OBJECTIVE:    Risk of Unplanned Readmission Score: 10.24         Current admission status: Inpatient       Preferred Pharmacy:   [id number] [address] - [address]  [address]  [facility] [place] [address]  Phone: [phone] Fax: [phone]    Primary Care Provider: No primary care provider on file.    Primary Insurance: BLUE CROSS  Secondary Insurance:     ASSESSMENT:  Active Health Care Proxies    There are no active Health Care Proxies on file.       Advance Directives  Does patient have a Health Care POA?: No  Was patient offered paperwork?: Yes (Accepted)  Does patient currently have a Health Care decision maker?: No  Does patient have Advance Directives?: No  Was patient offered paperwork?: Yes (Accepted)  Primary Contact: Alonso Ariza  Father  [phone]  Enoc Aceves  Spouse  [phone]         Readmission Root Cause  30 Day Readmission: No    Patient Information  Admitted from:: Home  Mental Status: Alert  During Assessment patient was accompanied by: Spouse  Assessment information provided by:: Patient  Primary Caregiver: Self  Support Systems: Spouse/significant other, Parent  County of Residence: [place]  [place]  Home entry access options. Select all that apply.: Stairs  Number of steps to enter home.: 2  Do the steps have railings?: Yes  Type of Current Residence: 2 story home  Upon entering residence, is there a  bedroom on the main floor (no further steps)?: No  A bedroom is located on the following floor levels of residence (select all that apply):: 2nd Floor  Upon entering residence, is there a bathroom on the main floor (no further steps)?: Yes  Number of steps to 2nd floor from main floor: One Flight  Living Arrangements: Lives w/ Spouse/significant other, Lives w/ Parent(s)  Is patient a ?: No    Activities of Daily Living Prior to Admission  Functional Status: Independent  Completes ADLs independently?: Yes  Ambulates independently?: Yes  Does patient use assisted devices?: No  Does patient currently own DME?: No  Does the patient have a history of Short-Term Rehab?: No  Does patient have a history of HHC?: No  Does patient currently have HHC?: No         Patient Information Continued  Income Source: Employed  Does patient have prescription coverage?: Yes  Does patient receive dialysis treatments?: No  Does patient have a history of Mental Health Diagnosis?: No         Means of Transportation  Means of Transport to [facility]:: Drives Self      Social Determinants of Health (SDOH)      Flowsheet Row Most Recent Value   Housing Stability    In the last 12 months, was there a time when you were not able to pay the mortgage or rent on time? N   In the last 12 months, how many places have you lived? 1   In the last 12 months, was there a time when you did not have a steady place to sleep or slept in a shelter (including now)? N   Transportation Needs    In the past 12 months, has lack of transportation kept you from medical appointments or from getting medications? no   In the past 12 months, has lack of transportation kept you from meetings, work, or from getting things needed for daily living? No   Food Insecurity    Within the past 12 months, you worried that your food would run out before you got the money to buy more. Never true   Within the past 12 months, the food you bought just didn't last and you didn't have  money to get more. Never true   Utilities    In the past 12 months has the electric, gas, oil, or water company threatened to shut off services in your home? No            DISCHARGE DETAILS:    Discharge planning discussed with:: Patient and spouse  Freedom of Choice: Yes  Comments - Freedom of Choice: CM met with simone to introduce ,explain role and discuss Freedom of Choice for d/c planning support options.  CM reviwed the cost of insulin and all supplies that were phoned into [facility] by [facility].  Patient staes the cost is afforable and if needed her Mother can assist financially.  CM gave the patient a Good RX card as well as OP Diabetic Education Support groups.  The patient has no other identified needs at this time.  Spouse to transport home.  CM contacted family/caregiver?: Yes (Spouse present in the room.)  Were Treatment Team discharge recommendations reviewed with patient/caregiver?: Yes  Did patient/caregiver verbalize understanding of patient care needs?: Yes  Were patient/caregiver advised of the risks associated with not following Treatment Team discharge recommendations?: Yes    Contacts  Patient Contacts: Alonso Ariza  Father  [phone]  Enoc Aceves  Spouse  [phone]  Relationship to Patient:: Family  Contact Method: In Person  Reason/Outcome: Continuity of Care    Requested Home Health Care         Is the patient interested in HHC at discharge?: No (Not homebound- works full time.)    DME Referral Provided  Referral made for DME?: No    Other Referral/Resources/Interventions Provided:  Financial Resources Provided: Prescription Discount Card  Interventions: FindHelp  Referral Comments: CM did a price check for insulin and Diabetic supplies .  [facility] for pickup today.  rj staes the costis affordable to her at this time.  CM also gave OP Diabetic Support Groups.    Would you like to participate in our [facility] service program?  : No -  Declined    Treatment Team Recommendation: Home  Discharge Destination Plan:: Home  Transport at Discharge : Family

## 2024-02-29 NOTE — DISCHARGE INSTR - AVS FIRST PAGE
Follow-up with endocrinology outpatient.  Please take your insulin as directed.  Please keep track of your blood sugar recordings so that endocrinology can make adjustments to your insulin regimen if needed.  Please return to the ER if you have any markedly elevated blood sugars greater than 400 for any very low blood sugars less than 70.  You have been prescribed glucagon which is an emergency medicine in case your sugar drops below 70 and does not respond to juice or candy.     Sliding scale instruction:   Blood Glucose 150 - 199: 2 Unit of Insulin   Blood Glucose 200 - 249: 4 Units of Insulin   Blood Glucose 250 - 299: 6 Units of Insulin   Blood Glucose 300 - 349: 8 Units of Insulin   Blood Glucose 350 - 399: 10 Units of Inuslin   Blood Glucose greater than or equal to 400: 12 Units of Insulin

## 2024-02-29 NOTE — PLAN OF CARE
Problem: PAIN - ADULT  Goal: Verbalizes/displays adequate comfort level or baseline comfort level  Description: Interventions:  - Encourage patient to monitor pain and request assistance  - Assess pain using appropriate pain scale  - Administer analgesics based on type and severity of pain and evaluate response  - Implement non-pharmacological measures as appropriate and evaluate response  - Consider cultural and social influences on pain and pain management  - Notify physician/advanced practitioner if interventions unsuccessful or patient reports new pain  2/29/2024 1321 by Amaya Eastman RN  Outcome: Adequate for Discharge  2/29/2024 1156 by Amaya Eastman RN  Outcome: Progressing     Problem: INFECTION - ADULT  Goal: Absence or prevention of progression during hospitalization  Description: INTERVENTIONS:  - Assess and monitor for signs and symptoms of infection  - Monitor lab/diagnostic results  - Monitor all insertion sites, i.e. indwelling lines, tubes, and drains  - Monitor endotracheal if appropriate and nasal secretions for changes in amount and color  - [place] appropriate cooling/warming therapies per order  - Administer medications as ordered  - Instruct and encourage patient and family to use good hand hygiene technique  - Identify and instruct in appropriate isolation precautions for identified infection/condition  2/29/2024 1321 by Amaya Eastman RN  Outcome: Adequate for Discharge  2/29/2024 1156 by Amaya Eastman RN  Outcome: Progressing  Goal: Absence of fever/infection during neutropenic period  Description: INTERVENTIONS:  - Monitor WBC    2/29/2024 1321 by Amaya Eastman RN  Outcome: Adequate for Discharge  2/29/2024 1156 by Amaya Eastman RN  Outcome: Progressing     Problem: SAFETY ADULT  Goal: Patient will remain free of falls  Description: INTERVENTIONS:  - Educate patient/family on patient safety including physical limitations  -  Instruct patient to call for assistance with activity   - Consult OT/PT to assist with strengthening/mobility   - Keep Call bell within reach  - Keep bed low and locked with side rails adjusted as appropriate  - Keep care items and personal belongings within reach  - Initiate and maintain comfort rounds  - Make Fall Risk Sign visible to staff  - Offer Toileting every 2 Hours, in advance of need  - Initiate/Maintain alarm  - Obtain necessary fall risk management equipment  - Apply yellow socks and bracelet for high fall risk patients  - Consider moving patient to room near nurses station  2/29/2024 1321 by Amaya Eastman RN  Outcome: Adequate for Discharge  2/29/2024 1156 by Amaya Eastman RN  Outcome: Progressing  Goal: Maintain or return to baseline ADL function  Description: INTERVENTIONS:  -  Assess patient's ability to carry out ADLs; assess patient's baseline for ADL function and identify physical deficits which impact ability to perform ADLs (bathing, care of mouth/teeth, toileting, grooming, dressing, etc.)  - Assess/evaluate cause of self-care deficits   - Assess range of motion  - Assess patient's mobility; develop plan if impaired  - Assess patient's need for assistive devices and provide as appropriate  - Encourage maximum independence but intervene and supervise when necessary  - Involve family in performance of ADLs  - Assess for home care needs following discharge   - Consider OT consult to assist with ADL evaluation and planning for discharge  - Provide patient education as appropriate  2/29/2024 1321 by Amaya Eastman RN  Outcome: Adequate for Discharge  2/29/2024 1156 by Amaya Eastman RN  Outcome: Progressing  Goal: Maintains/Returns to pre admission functional level  Description: INTERVENTIONS:  - Perform AM-PAC 6 Click Basic Mobility/ Daily Activity assessment daily.  - Set and communicate daily mobility goal to care team and patient/family/caregiver.   -  Collaborate with rehabilitation services on mobility goals if consulted  - Perform Range of Motion 3 times a day.  - Reposition patient every 2 hours.  - Dangle patient 3 times a day  - Stand patient 3 times a day  - Ambulate patient 3 times a day  - Out of bed to chair 3 times a day   - Out of bed for meals 3 times a day  - Out of bed for toileting  - Record patient progress and toleration of activity level   2/29/2024 1321 by Amaya Eastman RN  Outcome: Adequate for Discharge  2/29/2024 1156 by Amaya Eastman RN  Outcome: Progressing     Problem: DISCHARGE PLANNING  Goal: Discharge to home or other facility with appropriate resources  Description: INTERVENTIONS:  - Identify barriers to discharge w/patient and caregiver  - Arrange for needed discharge resources and transportation as appropriate  - Identify discharge learning needs (meds, wound care, etc.)  - Arrange for interpretive services to assist at discharge as needed  - Refer to Case Management Department for coordinating discharge planning if the patient needs post-hospital services based on physician/advanced practitioner order or complex needs related to functional status, cognitive ability, or social support system  2/29/2024 1321 by Amaya Eastman RN  Outcome: Adequate for Discharge  2/29/2024 1156 by Amaya Eastman RN  Outcome: Progressing     Problem: Knowledge Deficit  Goal: Patient/family/caregiver demonstrates understanding of disease process, treatment plan, medications, and discharge instructions  Description: Complete learning assessment and assess knowledge base.  Interventions:  - Provide teaching at level of understanding  - Provide teaching via preferred learning methods  2/29/2024 1321 by Amaya Eastman RN  Outcome: Adequate for Discharge  2/29/2024 1156 by Amaya Eastman RN  Outcome: Progressing

## 2024-02-29 NOTE — DISCHARGE SUMMARY
[facility]  Discharge- Yu Ariza 1990, 33 y.o. female MRN: [id number]  Unit/Bed#: -02 Encounter: [id number]  Primary Care Provider: No primary care provider on file.   Date and time admitted to hospital: 2/27/2024  1:09 PM    Hypernatremia  Assessment & Plan  Initial BMP noted with sodium 147 which likely not to reflection since BMP glucose was more than thousand.  Corrected sodium around 161 based on initial BMP.    Now resolved    Sinus tachycardia  Assessment & Plan  Noted with sinus tachycardia secondary to volume depletion  CTA PE study negative for PE.  Discontinue tele    Leukocytosis  Assessment & Plan  Likely reactive, improving    * DKA (diabetic ketoacidosis) (HCC)  Assessment & Plan  Lab Results   Component Value Date    HGBA1C 16.0 (H) 02/27/2024       Recent Labs     02/29/24  0221 02/29/24  0649 02/29/24  0811 02/29/24  1033   POCGLU 225* 251* 333* 278*         Blood Sugar Average: Last 72 hrs:  (P) 285.0017341265337131      33-year-old female patient presented to [facility] emergency room with complaining of 2 weeks duration of overall not feeling well, increased thirst, increased urination, dry mouth, generalized weakness, denied abdominal pain, nausea, vomiting on presentation.  Patient reports that since she was having a lot of thirst she end up taking lots of orange and apple juices.  Noted with Accu-Chek more than 500, BMP noted with sugar more than 1000.  ED states case was discussed with ICU and since patient acutely nontoxic-appearing, no GI symptoms, VBG without acidosis recommended admission to MedSurg floor with insulin drip.  Patient was started on insulin drip in ED.    New diagnosis of DM  D-dimer mildly elevated 0.92  CTA PE study report noted negative for PE, incidentally noted significant liver steatosis.  Bicarb 15, elevated beta hydroxybutyrate however VBG noted with pH within normal limits.  A1c 16%, lipid panel with elevated total  cholesterol, LDL, triglycerides   Endocrine following  Increased to 36u basal insulin, 10u premeal. Follow up outpatient    MILLIE (acute kidney injury) (HCC)-resolved as of 2/29/2024  Assessment & Plan  Admission Cr 1.83  Prerenal secondary to glucosuria and volume depletion  Improved with IV hydration        Medical Problems       Resolved Problems  Date Reviewed: 2/27/2024            Resolved    MILLIE (acute kidney injury) (HCC) 2/29/2024     Resolved by  Martinez Lopez MD        Discharging Physician / Practitioner: Martinez Lopez MD  PCP: No primary care provider on file.  Admission Date:   Admission Orders (From admission, onward)       Ordered        02/27/24 1732  INPATIENT ADMISSION  Once                          Discharge Date: 02/29/24    Consultations During Hospital Stay:  Endocrinology    Procedures Performed:   None    Significant Findings / Test Results:   CT Chest: No pulmonary embolism. No acute pulmonary pathology.Significant steatosis in the visualized liver.   A1c 16%     Incidental Findings:   None     Test Results Pending at Discharge (will require follow up):   Islet cell antibody, PREET, zinc transporter antibody, TSH     Outpatient Tests Requested:  None    Complications: None    Reason for Admission: Polyuria/polydipsia    Hospital Course:   Yu Ariza is a 33 y.o. female patient who originally presented to the hospital on 2/27/2024 due to generalized weakness, poor oral intake, polyuria, polydipsia.  She was found to have a glucose over thousand, with new diagnosis of diabetes.  Beta hydroxybutyrate was elevated, but no acidosis present on VBG.  She was treated as DKA with insulin drip.  Her numbers improved and symptoms improved significantly.  She initially had an MILLIE which resolved with IV fluids.  She also had significant hyponatremia which improved with IV hydration.  Endocrinology was consulted and recommended switching to basal/bolus insulin.  Her doses were titrated to 36  "units for basal insulin and 10 units for Premeal.  She was prescribed diabetic supplies as well as insulin.  She will follow-up outpatient with endocrinology.    Please see above list of diagnoses and related plan for additional information.     Condition at Discharge: good    Discharge Day Visit / Exam:   Subjective: Patient reports she feels much better today.  No new issues overnight.  Vitals: Blood Pressure: 130/80 (02/29/24 0720)  Pulse: 99 (02/29/24 0720)  Temperature: 98 °F (36.7 °C) (02/29/24 0720)  Temp Source: Oral (02/28/24 2235)  Respirations: 18 (02/29/24 0720)  Height: 5' 6\" (167.6 cm) (02/27/24 1258)  Weight - Scale: 103 kg (228 lb) (02/27/24 1258)  SpO2: 96 % (02/29/24 0720)  Exam:   Physical Exam  Vitals and nursing note reviewed.   Constitutional:       Appearance: Normal appearance.   HENT:      Head: Normocephalic and atraumatic.      Mouth/Throat:      Mouth: Mucous membranes are moist.   Eyes:      Conjunctiva/sclera: Conjunctivae normal.      Pupils: Pupils are equal, round, and reactive to light.   Cardiovascular:      Rate and Rhythm: Normal rate and regular rhythm.      Pulses: Normal pulses.      Heart sounds: Normal heart sounds. No murmur heard.     No gallop.   Pulmonary:      Effort: Pulmonary effort is normal. No respiratory distress.      Breath sounds: Normal breath sounds. No wheezing or rales.   Abdominal:      General: Abdomen is flat. Bowel sounds are normal. There is no distension.      Palpations: Abdomen is soft.      Tenderness: There is no abdominal tenderness. There is no guarding or rebound.   Musculoskeletal:         General: No swelling. Normal range of motion.   Skin:     General: Skin is warm and dry.      Capillary Refill: Capillary refill takes less than 2 seconds.   Neurological:      General: No focal deficit present.      Mental Status: She is alert. Mental status is at baseline.   Psychiatric:         Mood and Affect: Mood normal.          Discussion with " Family: Updated  (mother) at bedside.    Discharge instructions/Information to patient and family:   See after visit summary for information provided to patient and family.      Provisions for Follow-Up Care:  See after visit summary for information related to follow-up care and any pertinent home health orders.      Mobility at time of Discharge:   Basic Mobility Inpatient Raw Score: 18  JH-HLM Goal: 6: Walk 10 steps or more  JH-HLM Achieved: 6: Walk 10 steps or more  HLM Goal achieved. Continue to encourage appropriate mobility.     Disposition:   Home    Planned Readmission: None     Discharge Statement:  I spent 45 minutes discharging the patient. This time was spent on the day of discharge. I had direct contact with the patient on the day of discharge. Greater than 50% of the total time was spent examining patient, answering all patient questions, arranging and discussing plan of care with patient as well as directly providing post-discharge instructions.  Additional time then spent on discharge activities.    Discharge Medications:  See after visit summary for reconciled discharge medications provided to patient and/or family.      **Please Note: This note may have been constructed using a voice recognition system**

## 2024-02-29 NOTE — ASSESSMENT & PLAN NOTE
Lab Results   Component Value Date    HGBA1C 16.0 (H) 02/27/2024       Recent Labs     02/29/24  0221 02/29/24  0649 02/29/24  0811 02/29/24  1033   POCGLU 225* 251* 333* 278*         Blood Sugar Average: Last 72 hrs:  (P) 285.8754323709972494      33-year-old female patient presented to [facility] emergency room with complaining of 2 weeks duration of overall not feeling well, increased thirst, increased urination, dry mouth, generalized weakness, denied abdominal pain, nausea, vomiting on presentation.  Patient reports that since she was having a lot of thirst she end up taking lots of orange and apple juices.  Noted with Accu-Chek more than 500, BMP noted with sugar more than 1000.  ED states case was discussed with ICU and since patient acutely nontoxic-appearing, no GI symptoms, VBG without acidosis recommended admission to MedSur floor with insulin drip.  Patient was started on insulin drip in ED.    New diagnosis of DM  D-dimer mildly elevated 0.92  CTA PE study report noted negative for PE, incidentally noted significant liver steatosis.  Bicarb 15, elevated beta hydroxybutyrate however VBG noted with pH within normal limits.  A1c 16%, lipid panel with elevated total cholesterol, LDL, triglycerides   Endocrine following  Increased to 36u basal insulin, 10u premeal. Follow up outpatient

## 2024-02-29 NOTE — PLAN OF CARE
Problem: PAIN - ADULT  Goal: Verbalizes/displays adequate comfort level or baseline comfort level  Description: Interventions:  - Encourage patient to monitor pain and request assistance  - Assess pain using appropriate pain scale  - Administer analgesics based on type and severity of pain and evaluate response  - Implement non-pharmacological measures as appropriate and evaluate response  - Consider cultural and social influences on pain and pain management  - Notify physician/advanced practitioner if interventions unsuccessful or patient reports new pain  Outcome: Progressing     Problem: INFECTION - ADULT  Goal: Absence or prevention of progression during hospitalization  Description: INTERVENTIONS:  - Assess and monitor for signs and symptoms of infection  - Monitor lab/diagnostic results  - Monitor all insertion sites, i.e. indwelling lines, tubes, and drains  - Monitor endotracheal if appropriate and nasal secretions for changes in amount and color  - Julian appropriate cooling/warming therapies per order  - Administer medications as ordered  - Instruct and encourage patient and family to use good hand hygiene technique  - Identify and instruct in appropriate isolation precautions for identified infection/condition  Outcome: Progressing  Goal: Absence of fever/infection during neutropenic period  Description: INTERVENTIONS:  - Monitor WBC    Outcome: Progressing     Problem: SAFETY ADULT  Goal: Patient will remain free of falls  Description: INTERVENTIONS:  - Educate patient/family on patient safety including physical limitations  - Instruct patient to call for assistance with activity   - Consult OT/PT to assist with strengthening/mobility   - Keep Call bell within reach  - Keep bed low and locked with side rails adjusted as appropriate  - Keep care items and personal belongings within reach  - Initiate and maintain comfort rounds  - Make Fall Risk Sign visible to staff  - Offer Toileting every 2 Hours,  in advance of need  - Initiate/Maintain alarm  - Obtain necessary fall risk management equipment  - Apply yellow socks and bracelet for high fall risk patients  - Consider moving patient to room near nurses station  Outcome: Progressing  Goal: Maintain or return to baseline ADL function  Description: INTERVENTIONS:  -  Assess patient's ability to carry out ADLs; assess patient's baseline for ADL function and identify physical deficits which impact ability to perform ADLs (bathing, care of mouth/teeth, toileting, grooming, dressing, etc.)  - Assess/evaluate cause of self-care deficits   - Assess range of motion  - Assess patient's mobility; develop plan if impaired  - Assess patient's need for assistive devices and provide as appropriate  - Encourage maximum independence but intervene and supervise when necessary  - Involve family in performance of ADLs  - Assess for home care needs following discharge   - Consider OT consult to assist with ADL evaluation and planning for discharge  - Provide patient education as appropriate  Outcome: Progressing  Goal: Maintains/Returns to pre admission functional level  Description: INTERVENTIONS:  - Perform AM-PAC 6 Click Basic Mobility/ Daily Activity assessment daily.  - Set and communicate daily mobility goal to care team and patient/family/caregiver.   - Collaborate with rehabilitation services on mobility goals if consulted  - Perform Range of Motion 3 times a day.  - Reposition patient every 2 hours.  - Dangle patient 3 times a day  - Stand patient 3 times a day  - Ambulate patient 3 times a day  - Out of bed to chair 3 times a day   - Out of bed for meals 3 times a day  - Out of bed for toileting  - Record patient progress and toleration of activity level   Outcome: Progressing     Problem: DISCHARGE PLANNING  Goal: Discharge to home or other facility with appropriate resources  Description: INTERVENTIONS:  - Identify barriers to discharge w/patient and caregiver  - Arrange  for needed discharge resources and transportation as appropriate  - Identify discharge learning needs (meds, wound care, etc.)  - Arrange for interpretive services to assist at discharge as needed  - Refer to Case Management Department for coordinating discharge planning if the patient needs post-hospital services based on physician/advanced practitioner order or complex needs related to functional status, cognitive ability, or social support system  Outcome: Progressing     Problem: Knowledge Deficit  Goal: Patient/family/caregiver demonstrates understanding of disease process, treatment plan, medications, and discharge instructions  Description: Complete learning assessment and assess knowledge base.  Interventions:  - Provide teaching at level of understanding  - Provide teaching via preferred learning methods  Outcome: Progressing

## 2024-02-29 NOTE — UTILIZATION REVIEW
NOTIFICATION OF INPATIENT ADMISSION   AUTHORIZATION REQUEST   SERVICING FACILITY:   [place] [address]  Tax ID: [id number]  NPI: [id number] ATTENDING PROVIDER:  Attending Name and NPI#: Martinez Lopez Md [[id number]]  Address: [address] [place] [address]  Phone: [phone]     ADMISSION INFORMATION:  Place of Service: Inpatient [facility]  Place of Service Code: 21  Inpatient Admission Date/Time: 2/27/24  5:32 PM  Discharge Date/Time: No discharge date for patient encounter.  Admitting Diagnosis Code/Description:  Transaminitis [R74.01]  Hyperglycemia [R73.9]  Elevated serum creatinine [R79.89]  Hyperosmolar hyperglycemic state (HHS) (HCC) [E11.00]     UTILIZATION REVIEW CONTACT:  Grace Pulido, Utilization   Network Utilization Review Department  Phone: [phone]  Fax [phone]  Email: [email or web address]  Contact for approvals/pending authorizations, clinical reviews, and discharge.     PHYSICIAN ADVISORY SERVICES:  Medical Necessity Denial & Aoof-op-Yugo Review  Phone: [phone]  Fax: [phone]  Email: [email or web address]     DISCHARGE SUPPORT TEAM:  For Patients Discharge Needs & Updates  Phone: [phone] opt. 2 Fax: [phone]  Email: [email or web address]

## 2024-02-29 NOTE — ASSESSMENT & PLAN NOTE
Initial BMP noted with sodium 147 which likely not to reflection since BMP glucose was more than thousand.  Corrected sodium around 161 based on initial BMP.    Now resolved

## 2024-03-01 NOTE — UTILIZATION REVIEW
NOTIFICATION OF ADMISSION DISCHARGE   This is a Notification of Discharge from [facility]. Please be advised that this patient has been discharge from our facility. Below you will find the admission and discharge date and time including the patient’s disposition.   UTILIZATION REVIEW CONTACT:  Aleisha Pulido  Utilization   Network Utilization Review Department  Phone: [phone] x carefully listen to the prompts. All voicemails are confidential.  Email: [email or web address]     ADMISSION INFORMATION  PRESENTATION DATE: 2/27/2024  1:09 PM  OBERVATION ADMISSION DATE:   INPATIENT ADMISSION DATE: 2/27/24  5:32 PM   DISCHARGE DATE: 2/29/2024  4:37 PM   DISPOSITION:Home/Self Care    Network Utilization Review Department  ATTENTION: Please call with any questions or concerns to [phone] and carefully listen to the prompts so that you are directed to the right person. All voicemails are confidential.   For Discharge needs, contact Care Management DC Support Team at [phone] opt. 2  Send all requests for admission clinical reviews, approved or denied determinations and any other requests to dedicated fax number below belonging to the campus where the patient is receiving treatment. List of dedicated fax numbers for the Facilities:  FACILITY NAME UR FAX NUMBER   ADMISSION DENIALS (Administrative/Medical Necessity) [phone]   DISCHARGE SUPPORT TEAM ([facility]) [phone]   PARENT CHILD HEALTH (Maternity/NICU/Pediatrics) [phone]   [facility] [phone]   [facility] [phone]   [facility] [phone]   [facility] [phone]   [facility] [phone]   [facility] [phone]   [facility] [phone]   [facility]  [phone]   [facility] [phone]   [facility] [phone]   [facility] [phone]   [facility] [phone]

## 2024-03-04 ENCOUNTER — TELEPHONE (OUTPATIENT)
Dept: ENDOCRINOLOGY | Facility: CLINIC | Age: 34
End: 2024-03-04

## 2024-03-04 LAB — PANC ISLET CELL AB TITR SER: NEGATIVE {TITER}

## 2024-03-04 NOTE — TELEPHONE ENCOUNTER
Received referral for Yu Ariza.     Left voicemail for patient to contact office to schedule Consult/New Patient Appointment.

## 2024-03-05 LAB — GAD65 AB SER-ACNC: 94.4 U/ML (ref 0–5)

## 2024-03-11 LAB — ZNT8 AB SERPL IA-ACNC: <15 U/ML

## 2024-03-25 ENCOUNTER — TELEPHONE (OUTPATIENT)
Age: 34
End: 2024-03-25

## 2024-03-25 NOTE — TELEPHONE ENCOUNTER
Patient called regarding her A1C labs and insulin. She asked if she should skip her morning insulin dose before getting labs drawn. I informed her to take her medication as she normally would and to not skip any doses. She also asked if she had to fast before, and I told her she did not need to. With any questions or concerns, please call her at [phone].

## 2024-03-26 ENCOUNTER — OFFICE VISIT (OUTPATIENT)
Dept: ENDOCRINOLOGY | Facility: CLINIC | Age: 34
End: 2024-03-26
Payer: COMMERCIAL

## 2024-03-26 VITALS
DIASTOLIC BLOOD PRESSURE: 80 MMHG | BODY MASS INDEX: 36.26 KG/M2 | HEIGHT: 66 IN | OXYGEN SATURATION: 98 % | WEIGHT: 225.6 LBS | SYSTOLIC BLOOD PRESSURE: 118 MMHG | HEART RATE: 80 BPM

## 2024-03-26 DIAGNOSIS — R53.83 OTHER FATIGUE: ICD-10-CM

## 2024-03-26 DIAGNOSIS — E66.9 OBESITY (BMI 30-39.9): ICD-10-CM

## 2024-03-26 DIAGNOSIS — E78.1 HYPERTRIGLYCERIDEMIA: ICD-10-CM

## 2024-03-26 DIAGNOSIS — N92.6 MISSED PERIOD: ICD-10-CM

## 2024-03-26 DIAGNOSIS — E10.9 TYPE 1 DIABETES MELLITUS ON INSULIN THERAPY (HCC): Primary | ICD-10-CM

## 2024-03-26 PROCEDURE — 99244 OFF/OP CNSLTJ NEW/EST MOD 40: CPT | Performed by: INTERNAL MEDICINE

## 2024-03-26 RX ORDER — IBUPROFEN 600 MG/1
TABLET ORAL
COMMUNITY
Start: 2024-02-29

## 2024-03-26 RX ORDER — ATORVASTATIN CALCIUM 40 MG/1
40 TABLET, FILM COATED ORAL
Qty: 30 TABLET | Refills: 2 | Status: SHIPPED | OUTPATIENT
Start: 2024-03-26

## 2024-03-26 NOTE — PROGRESS NOTES
Yu Ariza  33 y.o.  Female MRN: [id number]  Encounter: [id number]     New Patient Consult Note      CC: Type 1 diabetes mellitus with DKA      Referring Provider  Martinez Lopez Md  [address] [place] [address]  [place] [address]       ASSESSMENT AND PLAN  Assessment:   Yu rAiza is a 33-year-old female with likely type 1 diabetes mellitus who recently presented with DKA and hypertriglyceridemia.     Plan:  Type 1 diabetes mellitus with DKA  - A1c: 16% (2/27/24), not at goal but glycemic trends are improving overall based upon recent blood glucose trends review  - She was advised to continue taking Lantus 36 units every morning for now and she can decrease the dose to 34 units every morning if her fasting blood glucose is <100 mg/dL   - She can discontinue her Humalog 10 units with meals  - Continue Humalog correctional scale 2 units for every 50 mg/dL above 150 mg/dL   - Continue SMBG and she can send the office a blood glucose log if she notices blood glucose consistently <70 mg/dL or >300 mg/dL   - CGM use was discussed with her and a sample offered to her, but she would like to defer this at this time and will consider it upon her next visit   - Counseled patient on following an ADA diet and continue aerobic exercise for at least 30 minutes per day, 5 days per week as tolerated  - Recommend annual retinopathy screening and ophthalmology referral placed   - Recommend regular self-foot exams at home  - Refer to diabetes educator for MNT with 3-day food log   - Reviewed hypoglycemic symptoms and management   - She is planning a pregnancy later this year and glycemic targets during pregnancy were briefly discussed with her during today's visit   - Will check a fasting C-peptide level in order to assess insulin reserve; if elevated could consider use of oral medications DPP4i vs. GLP-1RA in the future   - Recheck BMP and A1c along with urine alb/cr ratio 1 week prior to her next visit in 3 months   -  Will also obtain TPO antibodies and thyroglobulin antibodies given positive anti-GAD65 antibodies     2.   Hypertriglyceridemia  - Triglycerides 373 (24)  - Taking and tolerating atorvastatin 40 mg daily well  - Recheck lipid panel prior to her next visit     3.   Missed period   - Patient missed her menstrual cycle this month  - Advised to let the office know if she fails to have a cycle in the upcoming months  - Counseled patient to confirm negative pregnancy since she is sexually active       HISTORY OF PRESENT ILLNESS  HPI:  Yu Ariza is a 33-year-old female with a past medical history significant for recently-diagnosed type 1 diabetes mellitus with DKA and hypertriglyceridemia who presents for initial evaluation of her diabetes mellitus. She is referred by Dr. Lopez.     Diagnosed:     Current medications:     Lantus 36 units every morning if BG >100     Humalog 10 units before each meal if BG >150     Humalog 2-12 units before each meal 2 units for every 50 mg/dL above 150 mg/dL (adding usually 2-4 units with each meal)     Injects in back of arm and rotates sites appropriately.     Recent hospitalizations: 24  Recent steroid use: Denies   Gestational diabetes: Denies; planning on having children at the end of the year   DKA/HHS: 24  Antibodies: anti-GAD65 94.4 (24)  DKA: 24    Glycemic Control   A1c: 16% (24)   Self-Monitored Blood Glucose     Fingersticks: 4 times daily     Glucometer: OneTouch Verio     Ranges: 3/1/24-3/26/24      Fastins-290s      Lunch:  80s-308      Dinner: 90s-380s      Bedtime: 100s-300      Glycemic control has overall improved recently and over the past few weeks she has been ranging more in the 100s-200s with occasional 80s.      Hypoglycemia: Denies       Awareness: N/A        Treatment: N/A      Hospitalization: N/A    Lifestyle    Diabetes education: Inpatient nutritionist     Diet: 3 meals daily        Breakfast: Egg whites,  pears, toast, oatmeal       Lunch: Soup, rice, salad       Dinner: Salads, chicken, salmon, pasta       Snacks: Chips, sugar free yogurt       Drinks: Water, [place]id, seltzer     ADA diet compliance:     Physical activity: Cardio and weight lifting up to 1 hour 2-3 days weekly; [facility]     Modifications: Denies     Complications     Retinopathy/macular edema: Sees [facility]      Nephropathy: No Urine A/C ratio     Neuropathy: Denies      Macrovascular: Denies      ASCVD: Atorvastatin 40 mg daily      ACE/ARB: None     Diabetic foot ulcers: Denies       Dental disease: Denies    Vaccinations: Did not receive flu vaccine      Diabetic ROS: Denies polyphagia, polydipsia, polyuria, paresthesias or numbness of extremities, blurry vision, or recent weight gain or loss.     History of thyroid disorders: Denies  History of pancreatitis: Denies     Family history is significant for diabetes mellitus in: father diagnosed last year   Tobacco: Denies   Alcohol: Denies  Illicit drugs: Denies   Lives with: Boyfriend  Occupation:  at [employer]       Review of Systems   Constitutional:  Negative for chills, fever and unexpected weight change.   HENT:  Negative for ear pain and sore throat.    Eyes:  Negative for pain and visual disturbance.   Respiratory:  Negative for cough and shortness of breath.    Cardiovascular:  Negative for chest pain and palpitations.   Gastrointestinal:  Negative for abdominal pain and vomiting.   Endocrine: Negative for polydipsia, polyphagia and polyuria.   Genitourinary:  Negative for dysuria and hematuria.   Musculoskeletal:  Negative for arthralgias and back pain.   Skin:  Negative for color change and rash.   Neurological:  Negative for seizures and syncope.   All other systems reviewed and are negative.      Patient Active Problem List   Diagnosis    DKA (diabetic ketoacidosis) (HCC)    Leukocytosis    Sinus tachycardia    Hypernatremia      Past Medical History:    Diagnosis Date    DM (diabetes mellitus) (HCC)       History reviewed. No pertinent surgical history.   Family History   Problem Relation Age of Onset    No Known Problems Mother     Hyperlipidemia Father     Hypertension Father     Diabetes type II Father     No Known Problems Brother     No Known Problems Maternal Aunt     No Known Problems Maternal Uncle     No Known Problems Paternal Aunt     No Known Problems Paternal Uncle     Heart disease Maternal Grandmother     No Known Problems Maternal Grandfather     No Known Problems Paternal Grandfather      Social History     Tobacco Use    Smoking status: Never    Smokeless tobacco: Never   Substance Use Topics    Alcohol use: Not Currently     No Known Allergies      Current Outpatient Medications:     Alcohol Swabs 70 % PADS, May substitute brand based on insurance coverage. Check glucose ACHS., Disp: 200 each, Rfl: 0    atorvastatin (LIPITOR) 40 mg tablet, Take 1 tablet (40 mg total) by mouth daily with dinner, Disp: 30 tablet, Rfl: 0    Blood Glucose Monitoring Suppl (OneTouch Verio Reflect) w/Device KIT, May substitute brand based on insurance coverage. Check glucose ACHS., Disp: 1 kit, Rfl: 0    Glucagon, rDNA, (Glucagon Emergency) 1 MG KIT, INJECT 1 ML SUBCUTANEOUSLY ONCE DAILY AS NEEDED FOR SEVERE HYPOGLYCEMIA, Disp: , Rfl:     glucose blood (OneTouch Verio) test strip, May substitute brand based on insurance coverage. Check glucose ACHS., Disp: 200 each, Rfl: 0    Insulin Glargine Solostar (Lantus SoloStar) 100 UNIT/ML SOPN, Inject 0.36 mL (36 Units total) under the skin daily in the early morning, Disp: 15 mL, Rfl: 0    insulin lispro (HumaLOG KwikPen) 100 units/mL injection pen, Inject 10 Units under the skin 3 (three) times a day with meals, Disp: 15 mL, Rfl: 0    Insulin Pen Needle (BD Pen Needle Luz 2nd Gen) 32G X 4 MM MISC, For use with insulin pen. Pharmacy may dispense brand covered by insurance., Disp: 100 each, Rfl: 0    OneTouch Delica Lancets  "33G MISC, May substitute brand based on insurance coverage. Check glucose ACHS., Disp: 200 each, Rfl: 0    Glucagon HCl (Glucagon Emergency) 1 MG/ML SOLR, Inject 1 mL as directed daily as needed (severe hypoglycemia), Disp: 1 each, Rfl: 0    insulin lispro (HumALOG/ADMELOG) 100 units/mL injection, Inject 2-12 Units under the skin 3 (three) times a day before meals, Disp: 10 mL, Rfl: 0    Insulin Pen Needle (BD Pen Needle Luz 2nd Gen) 32G X 4 MM MISC, For use with insulin pen. Pharmacy may dispense brand covered by insurance., Disp: 100 each, Rfl: 0      OBJECTIVE  Visit Vitals  /80 (BP Location: Left arm, Patient Position: Sitting, Cuff Size: Large)   Pulse 80   Ht 5' 6\" (1.676 m)   Wt 102 kg (225 lb 9.6 oz)   LMP 02/19/2024 (Exact Date)   SpO2 98%   BMI 36.41 kg/m²   OB Status Having periods   Smoking Status Never   BSA 2.1 m²       Physical Exam  Constitutional:       Appearance: Normal appearance.   HENT:      Head: Normocephalic and atraumatic. Hair is normal.      Mouth/Throat:      Mouth: Mucous membranes are moist.      Pharynx: Oropharynx is clear.   Eyes:      Extraocular Movements: Extraocular movements intact.      Pupils: Pupils are equal, round, and reactive to light.   Neck:      Thyroid: No thyromegaly or thyroid tenderness.      Trachea: No tracheal tenderness or tracheal deviation.   Cardiovascular:      Rate and Rhythm: Normal rate and regular rhythm.      Pulses: Normal pulses. no weak pulses.           Dorsalis pedis pulses are 2+ on the right side and 2+ on the left side.        Posterior tibial pulses are 2+ on the right side and 2+ on the left side.      Heart sounds: S1 normal and S2 normal.   Pulmonary:      Effort: No respiratory distress.      Breath sounds: Normal breath sounds.   Abdominal:      General: Abdomen is flat. Bowel sounds are normal.      Palpations: Abdomen is soft.      Tenderness: There is no abdominal tenderness.   Musculoskeletal:      Cervical back: Full passive " range of motion without pain, normal range of motion and neck supple. No tenderness.   Feet:      Right foot:      Skin integrity: No ulcer, skin breakdown, erythema, warmth, callus or dry skin.      Left foot:      Skin integrity: No ulcer, skin breakdown, erythema, warmth, callus or dry skin.   Lymphadenopathy:      Cervical: No cervical adenopathy.   Skin:     General: Skin is warm and dry.   Neurological:      Mental Status: She is alert and oriented to person, place, and time.      Motor: No tremor.      Deep Tendon Reflexes: Reflexes are normal and symmetric.   Psychiatric:         Mood and Affect: Mood normal.         Thought Content: Thought content normal.         Judgment: Judgment normal.         Diabetic Foot Exam  Patient's shoes and socks removed.    Right Foot/Ankle   Right Foot Inspection  Skin Exam: skin normal and skin intact. No dry skin, no warmth, no callus, no erythema, no maceration, no abnormal color, no pre-ulcer, no ulcer and no callus.     Toe Exam: ROM and strength within normal limits.     Sensory   Vibration: intact  Proprioception: intact  Monofilament testing: intact    Vascular  Capillary refills: < 3 seconds  The right DP pulse is 2+. The right PT pulse is 2+.     Left Foot/Ankle  Left Foot Inspection  Skin Exam: skin normal and skin intact. No dry skin, no warmth, no erythema, no maceration, normal color, no pre-ulcer, no ulcer and no callus.     Toe Exam: ROM and strength within normal limits.     Sensory   Vibration: intact  Proprioception: intact  Monofilament testing: intact    Vascular  Capillary refills: < 3 seconds  The left DP pulse is 2+. The left PT pulse is 2+.     Assign Risk Category  No deformity present  No loss of protective sensation  No weak pulses  Risk: 0      Labs:    Latest Reference Range & Units 02/27/24 13:20   Hemoglobin A1C Normal 4.0-5.6%; PreDiabetic 5.7-6.4%; Diabetic >=6.5%; Glycemic control for adults with diabetes <7.0% % 16.0 (H)   (H): Data is  abnormally high   Latest Reference Range & Units 02/29/24 08:36 02/29/24 10:43   TSH 3RD GENERATON 0.450 - 4.500 uIU/mL 1.186    ISLET CELL ANTIBODY Neg:<1:1   Negative   Glutaminc Acid Decarboxylase 65 Ab 0.0 - 5.0 U/mL  94.4 (H)   ZINC TRANSPORTER 8 (ZNT8) ANTIBODY U/mL  <15   (H): Data is abnormally high    Discussed with the patient and all questioned fully answered. She will call me if any problems arise.    Counseled patient on diagnostic results, prognosis, risk and benefit of treatment options, instruction for management, importance of treatment compliance, risk factor reduction, and impressions.

## 2024-03-26 NOTE — PATIENT INSTRUCTIONS
- You can continue taking Lantus 36 units every morning, but if you notice your blood sugars in the morning are consistently lower than 100 then you can take 34 units every morning. Do not hold this medication! Call the office if you notice blood sugars consistently <70 mg/dL or >300 mg/dL.   - Stop taking Humalog but keep the correctional scale as backup and if your pre-meal blood sugars are >150 you can take some Humalog  - We will do a Dexcom CGM trial so you can try it out next time   - Please have the lab work done while fasting and we will repeat your A1c and cholesterol levels in May 2024  - Please call the office if you miss your period for multiple months at a time   - Please let us know if you have a positive pregnancy test   - Please see a diabetes educator with a 3-day food log and blood sugar log

## 2024-04-28 PROBLEM — E11.10 DKA (DIABETIC KETOACIDOSIS) (HCC): Status: RESOLVED | Noted: 2024-02-27 | Resolved: 2024-04-28

## 2024-05-15 DIAGNOSIS — E13.10 DIABETIC KETOACIDOSIS WITHOUT COMA ASSOCIATED WITH OTHER SPECIFIED DIABETES MELLITUS (HCC): ICD-10-CM

## 2024-05-15 RX ORDER — INSULIN LISPRO 100 [IU]/ML
2-12 INJECTION, SOLUTION INTRAVENOUS; SUBCUTANEOUS
Qty: 30 ML | Refills: 1 | Status: SHIPPED | OUTPATIENT
Start: 2024-05-15

## 2024-05-15 RX ORDER — INSULIN GLARGINE 100 [IU]/ML
36 INJECTION, SOLUTION SUBCUTANEOUS
Qty: 30 ML | Refills: 1 | Status: SHIPPED | OUTPATIENT
Start: 2024-05-15

## 2024-05-15 NOTE — TELEPHONE ENCOUNTER
Reason for call:   [x] Refill   [] Prior Auth  [] Other:     Office:   [] PCP/Provider -   [x] Specialty/Provider - Endo    Medication:   Lantus Solostar 100unit/ml- inject 0.36ml under the skin daily in the early morning   Humalog 100units/ml- inject 2-12 units under the skin 3 times a day before meals      Pharmacy: [facility] [place]     Does the patient have enough for 3 days?   [x] Yes   [] No - Send as HP to POD

## 2024-05-23 ENCOUNTER — APPOINTMENT (OUTPATIENT)
Dept: LAB | Facility: CLINIC | Age: 34
End: 2024-05-23
Payer: COMMERCIAL

## 2024-05-23 DIAGNOSIS — E78.1 HYPERTRIGLYCERIDEMIA: ICD-10-CM

## 2024-05-23 DIAGNOSIS — R53.83 OTHER FATIGUE: ICD-10-CM

## 2024-05-23 DIAGNOSIS — E10.9 TYPE 1 DIABETES MELLITUS ON INSULIN THERAPY (HCC): ICD-10-CM

## 2024-05-23 LAB
ANION GAP SERPL CALCULATED.3IONS-SCNC: 8 MMOL/L (ref 4–13)
BUN SERPL-MCNC: 14 MG/DL (ref 5–25)
CALCIUM SERPL-MCNC: 9.3 MG/DL (ref 8.4–10.2)
CHLORIDE SERPL-SCNC: 104 MMOL/L (ref 96–108)
CHOLEST SERPL-MCNC: 185 MG/DL
CO2 SERPL-SCNC: 26 MMOL/L (ref 21–32)
CREAT SERPL-MCNC: 0.65 MG/DL (ref 0.6–1.3)
CREAT UR-MCNC: 114.5 MG/DL
EST. AVERAGE GLUCOSE BLD GHB EST-MCNC: 166 MG/DL
GFR SERPL CREATININE-BSD FRML MDRD: 118 ML/MIN/1.73SQ M
GLUCOSE P FAST SERPL-MCNC: 125 MG/DL (ref 65–99)
HBA1C MFR BLD: 7.4 %
HDLC SERPL-MCNC: 48 MG/DL
LDLC SERPL CALC-MCNC: 119 MG/DL (ref 0–100)
MICROALBUMIN UR-MCNC: 35.8 MG/L
MICROALBUMIN/CREAT 24H UR: 31 MG/G CREATININE (ref 0–30)
NONHDLC SERPL-MCNC: 137 MG/DL
POTASSIUM SERPL-SCNC: 4 MMOL/L (ref 3.5–5.3)
SODIUM SERPL-SCNC: 138 MMOL/L (ref 135–147)
TRIGL SERPL-MCNC: 90 MG/DL

## 2024-05-23 PROCEDURE — 80061 LIPID PANEL: CPT

## 2024-05-23 PROCEDURE — 36415 COLL VENOUS BLD VENIPUNCTURE: CPT

## 2024-05-23 PROCEDURE — 86800 THYROGLOBULIN ANTIBODY: CPT

## 2024-05-23 PROCEDURE — 80048 BASIC METABOLIC PNL TOTAL CA: CPT

## 2024-05-23 PROCEDURE — 83036 HEMOGLOBIN GLYCOSYLATED A1C: CPT

## 2024-05-23 PROCEDURE — 86376 MICROSOMAL ANTIBODY EACH: CPT

## 2024-05-23 PROCEDURE — 82043 UR ALBUMIN QUANTITATIVE: CPT

## 2024-05-23 PROCEDURE — 82570 ASSAY OF URINE CREATININE: CPT

## 2024-05-23 PROCEDURE — 84681 ASSAY OF C-PEPTIDE: CPT

## 2024-05-24 DIAGNOSIS — E13.10 DIABETIC KETOACIDOSIS WITHOUT COMA ASSOCIATED WITH OTHER SPECIFIED DIABETES MELLITUS (HCC): ICD-10-CM

## 2024-05-24 LAB
C PEPTIDE SERPL-MCNC: 1.8 NG/ML (ref 1.1–4.4)
THYROGLOB AB SERPL-ACNC: <1 IU/ML (ref 0–0.9)
THYROPEROXIDASE AB SERPL-ACNC: <9 IU/ML (ref 0–34)

## 2024-05-24 RX ORDER — PEN NEEDLE, DIABETIC 32GX 5/32"
NEEDLE, DISPOSABLE MISCELLANEOUS
Qty: 100 EACH | Refills: 1 | Status: SHIPPED | OUTPATIENT
Start: 2024-05-24

## 2024-05-24 NOTE — TELEPHONE ENCOUNTER
Reason for call:   [x] Refill   [] Prior Auth  [] Other:     Office:   [] PCP/Provider -   [x] Specialty/Provider - Endo    Medication: nsulin Pen Needle (BD Pen Needle Luz 2nd Gen) 32G X 4 MM MISC     Dose/Frequency: For use with insulin pen     Quantity: 100    Pharmacy: [id number] - [place] - [phone]       Does the patient have enough for 3 days?   [] Yes   [x] No - Send as HP to POD

## 2024-06-21 DIAGNOSIS — E78.1 HYPERTRIGLYCERIDEMIA: ICD-10-CM

## 2024-06-21 RX ORDER — ATORVASTATIN CALCIUM 40 MG/1
TABLET, FILM COATED ORAL
Qty: 30 TABLET | Refills: 5 | Status: SHIPPED | OUTPATIENT
Start: 2024-06-21

## 2024-07-21 DIAGNOSIS — E13.10 DIABETIC KETOACIDOSIS WITHOUT COMA ASSOCIATED WITH OTHER SPECIFIED DIABETES MELLITUS (HCC): ICD-10-CM

## 2024-07-21 RX ORDER — PEN NEEDLE, DIABETIC 32GX 5/32"
NEEDLE, DISPOSABLE MISCELLANEOUS
Qty: 100 EACH | Refills: 1 | Status: SHIPPED | OUTPATIENT
Start: 2024-07-21

## 2024-07-25 ENCOUNTER — TELEPHONE (OUTPATIENT)
Age: 34
End: 2024-07-25

## 2024-07-25 NOTE — TELEPHONE ENCOUNTER
Patient last seen in 3/2024, is due for follow-up  Her last A1c was 7.4% in 5/2024, ideally preconception, recommend lower A1c   statin medications should be held with for 3 months prior to trying to conceive. She can continue Lantus and Humalog for management of her diabetes mellitus.  She should follow-up with her OB/GYN as well as have a MFM consult   15-Sep-2020 11:40

## 2024-07-25 NOTE — TELEPHONE ENCOUNTER
Patient called- she wants to have a baby and is concerned about her medications. She is taking Humalog, Lantus and Atorvastatin. Do any of these need to be changed while trying to conceive, or while pregnant?     Please advise, call back number is [phone].

## 2024-10-15 ENCOUNTER — TELEPHONE (OUTPATIENT)
Age: 34
End: 2024-10-15

## 2024-10-15 ENCOUNTER — OFFICE VISIT (OUTPATIENT)
Age: 34
End: 2024-10-15
Payer: COMMERCIAL

## 2024-10-15 VITALS
TEMPERATURE: 98.7 F | HEIGHT: 66 IN | HEART RATE: 103 BPM | WEIGHT: 233 LBS | BODY MASS INDEX: 37.45 KG/M2 | DIASTOLIC BLOOD PRESSURE: 72 MMHG | SYSTOLIC BLOOD PRESSURE: 118 MMHG | OXYGEN SATURATION: 99 %

## 2024-10-15 DIAGNOSIS — E13.10 DIABETIC KETOACIDOSIS WITHOUT COMA ASSOCIATED WITH OTHER SPECIFIED DIABETES MELLITUS (HCC): ICD-10-CM

## 2024-10-15 DIAGNOSIS — E10.9 TYPE 1 DIABETES MELLITUS ON INSULIN THERAPY (HCC): Primary | ICD-10-CM

## 2024-10-15 LAB — SL AMB POCT HEMOGLOBIN AIC: 6.9 (ref ?–6.5)

## 2024-10-15 PROCEDURE — 99214 OFFICE O/P EST MOD 30 MIN: CPT | Performed by: STUDENT IN AN ORGANIZED HEALTH CARE EDUCATION/TRAINING PROGRAM

## 2024-10-15 PROCEDURE — 83036 HEMOGLOBIN GLYCOSYLATED A1C: CPT | Performed by: STUDENT IN AN ORGANIZED HEALTH CARE EDUCATION/TRAINING PROGRAM

## 2024-10-15 RX ORDER — INSULIN GLARGINE 100 [IU]/ML
38 INJECTION, SOLUTION SUBCUTANEOUS
Qty: 30 ML | Refills: 1 | Status: SHIPPED | OUTPATIENT
Start: 2024-10-15

## 2024-10-15 RX ORDER — ACYCLOVIR 400 MG/1
1 TABLET ORAL
Qty: 9 EACH | Refills: 1 | Status: SHIPPED | OUTPATIENT
Start: 2024-10-15

## 2024-10-15 NOTE — TELEPHONE ENCOUNTER
Np calling to schedule MFM appt since she is a diabetic and received a referral from her Endocrine provider, according to the decision tree patient needs a referral from her OBGYN or Family Medicine provider in order to schedule. If able to place order for MFM prior to patients first appt for D&V on 11/06/2024 at 11:30am LEO Bermudez, please call patient to inform her so she can schedule appt with MFM.

## 2024-10-15 NOTE — PROGRESS NOTES
Ambulatory Visit  Name: Yu Ariza      : 1990      MRN: [id number]  Encounter Provider: Donovan Hoang MD  Encounter Date: 10/15/2024   Encounter department: [facility]    Assessment & Plan  Type 1 diabetes mellitus on insulin therapy (HCC)    Lab Results   Component Value Date    HGBA1C 6.9 (A) 10/15/2024     Yu was diagnosed with DKA and positive PREET antibody which is consistent with type 1 diabetes.  She just found out that she is pregnant,   We did discussed importance of glycemic control to avoid fetal anomalies related to uncontrolled diabetes.  We did reviewed glycemic control in pregnant patient with pre-existing diabetes, fasting blood sugar 70 - 90 mg/dl, 1 hour post meal 110 - 140 and 2 hours post prandial 110 - 120 mg/dl, A1C goal < 6 - 6.5%, ( during pregnancy A1C falls due to RBC changes).  Lantus was increased to 38 units daily and continue Novolog at current dose, she was instructed to check blood sugars 4 times day and send me sugar log in few days,   She was referred to fetomaternal medicine.  Dilated eye exam ever trimester and one year post partum advised.  Hypoglycemia symptoms and treatment discussed.  CGM is very helpful which was ordered.  Blood pressure goal discussed,  She already discontinued statin      Orders:    POCT hemoglobin A1c    Continuous Glucose Sensor (Dexcom G7 Sensor); Use 1 Device every 10 days    Ambulatory Referral to Maternal Fetal Medicine; Future    Diabetic ketoacidosis without coma associated with other specified diabetes mellitus (HCC)    Lab Results   Component Value Date    HGBA1C 6.9 (A) 10/15/2024       Orders:    Insulin Glargine Solostar (Lantus SoloStar) 100 UNIT/ML SOPN; Inject 0.38 mL (38 Units total) under the skin daily in the early morning      History of Present Illness     Yu Ariza is a 33 y.o. female who presents for follow up,    She was seen in [facility] office on 3/26/2024,    She was admitted for DKA in February 2024, and further evaluation showed positive PREET antibody consistent with type 1 diabetes.    Her current regimen :    Lantus 36 units qam  Humalog 10 units plus sliding scale , average 12 units before meals,     SMBG x 2 , 100 - 150 mg/dl.    Denies recent illness or hospitalizations.      Hypoglycemic episodes:   H/o of hypoglycemia causing hospitalization or Intervention such as glucagon injection  or ambulance call : no  Has awareness: yes       Opthamology:  UTD;   Podiatry: UTD    on statin: she was on Lipitor which was discontinued prior to pregnancy.       History obtained from : patient  Review of Systems   Constitutional:  Negative for appetite change, fatigue and unexpected weight change.   HENT:  Negative for trouble swallowing and voice change.    Cardiovascular:  Negative for chest pain and palpitations.   Gastrointestinal:  Negative for abdominal pain, constipation, diarrhea, nausea and vomiting.   Endocrine: Negative for cold intolerance, heat intolerance, polydipsia, polyphagia and polyuria.     Medical History Reviewed by provider this encounter:       Current Outpatient Medications on File Prior to Visit   Medication Sig Dispense Refill    Alcohol Swabs 70 % PADS May substitute brand based on insurance coverage. Check glucose ACHS. 200 each 0    Blood Glucose Monitoring Suppl (OneTouch Verio Reflect) w/Device KIT May substitute brand based on insurance coverage. Check glucose ACHS. 1 kit 0    Glucagon, rDNA, (Glucagon Emergency) 1 MG KIT INJECT 1 ML SUBCUTANEOUSLY ONCE DAILY AS NEEDED FOR SEVERE HYPOGLYCEMIA      glucose blood (OneTouch Verio) test strip May substitute brand based on insurance coverage. Check glucose ACHS. 200 each 0    insulin lispro (HumALOG/ADMELOG) 100 units/mL injection Inject 2-12 Units under the skin 3 (three) times a day before meals 30 mL 1    Insulin Pen Needle (BD Pen Needle Luz 2nd Gen) 32G X 4 MM MISC USE WITH INSULIN   each 1    OneTouch Delica Lancets 33G MISC May substitute brand based on insurance coverage. Check glucose ACHS. 200 each 0    Prenatal Multivit-Min-Fe-FA (PRE- PO) Take by mouth daily      [DISCONTINUED] Insulin Glargine Solostar (Lantus SoloStar) 100 UNIT/ML SOPN Inject 0.36 mL (36 Units total) under the skin daily in the early morning 30 mL 1    atorvastatin (LIPITOR) 40 mg tablet TAKE 1 TABLET BY MOUTH ONCE DAILY WITH SUPPER (Patient not taking: Reported on 10/15/2024) 30 tablet 5     No current facility-administered medications on file prior to visit.          Objective     There were no vitals taken for this visit.    Physical Exam  Vitals and nursing note reviewed.   Constitutional:       General: She is not in acute distress.     Appearance: She is well-developed.   HENT:      Head: Normocephalic and atraumatic.   Cardiovascular:      Rate and Rhythm: Normal rate and regular rhythm.   Pulmonary:      Effort: Pulmonary effort is normal. No respiratory distress.   Abdominal:      Palpations: Abdomen is soft.   Musculoskeletal:         General: No swelling.      Cervical back: Neck supple.   Skin:     General: Skin is warm and dry.   Neurological:      Mental Status: She is alert.       Administrative Statements   I have spent a total time of 40 minutes in caring for this patient on the day of the visit/encounter including Diagnostic results, Prognosis, Risks and benefits of tx options, Instructions for management, Patient and family education, Importance of tx compliance, Risk factor reductions, Impressions, Counseling / Coordination of care, Documenting in the medical record, Reviewing / ordering tests, medicine, procedures  , and Obtaining or reviewing history  .    Component      Latest Ref Rng 2024 2024 10/15/2024   Hemoglobin A1C      6.5   7.4 (H)  6.9 !    eAG, EST AVG Glucose      mg/dl  166     Glutaminc Acid Decarboxylase 65 Ab      0.0 - 5.0 U/mL 94.4 (H)      ISLET CELL ANTIBODY       Neg:<1:1  Negative      ZINC TRANSPORTER 8 (ZNT8) ANTIBODY      U/mL <15      TSH 3RD GENERATON      0.450 - 4.500 uIU/mL 1.186      C-PEPTIDE      1.1 - 4.4 ng/mL  1.8     THYROGLOBULIN AB      0.0 - 0.9 IU/mL  <1.0     THYROID MICROSOMAL ANTIBODY      0 - 34 IU/mL  <9        Legend:  (H) High  ! Abnormal

## 2024-10-15 NOTE — TELEPHONE ENCOUNTER
Spoke to patient and told her we cannot give a referral to [facility] prior to her dating and viability appointment. At that appointment she will be referred to [facility]. Patient verbalized understanding.

## 2024-10-15 NOTE — PATIENT INSTRUCTIONS
We increased lantus to 38 units daily, continue humalog at current dose,  Check blood sugars 4 times daily and send me sugar long in few days,  We referred you to Fetomaternal medicine,       Treatment for HYPOGLYCEMIA- RULE OF 15  Symptoms include tremors, diaphoresis, hunger, confusion, headache  Treat IMMEDIATELY with 15 grams of Carbs  ½ cup regular juice/soda  2 tablespoons raisins  4 teaspoons sugar  1 tablespoon honey  3-4 glucose tablets  Recheck blood glucose in 15 mins and repeat above if still symptomatic/blood glucose <80    
DISPLAY PLAN FREE TEXT

## 2024-10-16 NOTE — ASSESSMENT & PLAN NOTE
Lab Results   Component Value Date    HGBA1C 6.9 (A) 10/15/2024     Yu was diagnosed with DKA and positive PREET antibody which is consistent with type 1 diabetes.  She just found out that she is pregnant,   We did discussed importance of glycemic control to avoid fetal anomalies related to uncontrolled diabetes.  We did reviewed glycemic control in pregnant patient with pre-existing diabetes, fasting blood sugar 70 - 90 mg/dl, 1 hour post meal 110 - 140 and 2 hours post prandial 110 - 120 mg/dl, A1C goal < 6 - 6.5%, ( during pregnancy A1C falls due to RBC changes).  Lantus was increased to 38 units daily and continue Novolog at current dose, she was instructed to check blood sugars 4 times day and send me sugar log in few days,   She was referred to fetomaternal medicine.  Dilated eye exam ever trimester and one year post partum advised.  Hypoglycemia symptoms and treatment discussed.  CGM is very helpful which was ordered.  Blood pressure goal discussed,  She already discontinued statin      Orders:    POCT hemoglobin A1c    Continuous Glucose Sensor (Dexcom G7 Sensor); Use 1 Device every 10 days    Ambulatory Referral to Maternal Fetal Medicine; Future

## 2024-11-04 ENCOUNTER — TELEPHONE (OUTPATIENT)
Age: 34
End: 2024-11-04

## 2024-11-04 NOTE — TELEPHONE ENCOUNTER
LVM   Had to reschedule appt with Dr. Hoang on 1/21/25 - He will not be in the office that day.    Moved appt to Kristina's schedule     Advised her to call the office to reschedule if it was not a good time or she only wishes to see Dr. Hoang.

## 2024-11-06 ENCOUNTER — ULTRASOUND (OUTPATIENT)
Dept: OBGYN CLINIC | Facility: CLINIC | Age: 34
End: 2024-11-06
Payer: COMMERCIAL

## 2024-11-06 ENCOUNTER — TELEPHONE (OUTPATIENT)
Dept: OBGYN CLINIC | Facility: CLINIC | Age: 34
End: 2024-11-06

## 2024-11-06 VITALS
DIASTOLIC BLOOD PRESSURE: 70 MMHG | HEIGHT: 67 IN | SYSTOLIC BLOOD PRESSURE: 128 MMHG | BODY MASS INDEX: 37.57 KG/M2 | WEIGHT: 239.4 LBS

## 2024-11-06 DIAGNOSIS — Z3A.10 10 WEEKS GESTATION OF PREGNANCY: Primary | ICD-10-CM

## 2024-11-06 DIAGNOSIS — N91.1 SECONDARY AMENORRHEA: ICD-10-CM

## 2024-11-06 DIAGNOSIS — E10.9 TYPE 1 DIABETES MELLITUS ON INSULIN THERAPY (HCC): ICD-10-CM

## 2024-11-06 DIAGNOSIS — E10.9 TYPE 1 DIABETES MELLITUS WITHOUT COMPLICATION (HCC): ICD-10-CM

## 2024-11-06 PROCEDURE — 99213 OFFICE O/P EST LOW 20 MIN: CPT | Performed by: PHYSICIAN ASSISTANT

## 2024-11-06 PROCEDURE — 76817 TRANSVAGINAL US OBSTETRIC: CPT | Performed by: PHYSICIAN ASSISTANT

## 2024-11-06 NOTE — TELEPHONE ENCOUNTER
Patient needs PN1 week of 12/2 *14 weeks*    WHITNEY 6/3/25    Patient will be gone on vacation from 11/13-11/20.     Patient is scheduled all the way out     [place]/andre/erik .. Lara if necessary

## 2024-11-06 NOTE — PROGRESS NOTES
"   Subjective  Patient ID: Yu Ariza is a 33 y.o. female here for Pregnancy Ultrasound    Newly Pregnant  LMP 24 giving her an WHITNEY of 6/3/24 and a gestational age of  10 weeks 1 days (based on LMP)    Menstrual cycle: regular  Pregnancy was planned.   She has started taking a prenatal vitamin    She is C/O amenorrhea  Signs and symptoms of pregnancy:   Cramping or Pelvic Pain: no  Spotting or Vaginal Bleeding: no  Nausea or vomiting: nausea only    OB History    Para Term  AB Living   1             SAB IAB Ectopic Multiple Live Births                  # Outcome Date GA Lbr En/2nd Weight Sex Type Anes PTL Lv   1 Current                 The following portions of the patient's history were reviewed and updated as appropriate: allergies, current medications, past family history, past medical history, past social history, past surgical history, and problem list.    Perinent hx that may affect pregnancy:  Maternal obesity  Type 1 DM - last A1C 6.9%; managed by [facility] endocrine; will need referral to [facility] diabetic pathways      Review of Systems    See HPI for pertinent positives.             /70 (BP Location: Left arm, Patient Position: Sitting, Cuff Size: Large)   Ht 5' 6.5\" (1.689 m)   Wt 109 kg (239 lb 6.4 oz)   LMP 2024 (Exact Date)   BMI 38.06 kg/m²   OBGyn Exam  Results for orders placed or performed in visit on 10/15/24   POCT hemoglobin A1c    Collection Time: 10/15/24 11:33 AM   Result Value Ref Range    Hemoglobin A1C 6.9 (A) 6.5       FIRST TRIMESTER OBSTETRIC ULTRASOUND     Patient's last menstrual period was 2024 (exact date).    INDICATION: Establish Gestational Age       FINDINGS:  See imaging report for details     Additional Findings: none     FHR: 170 bpm    IMPRESSION:    Single intrauterine pregnancy of 10 weeks 6 days gestational age  Fetal cardiac activity detected.  No adnexal masses seen.  EDC by LMP: 6/3/25  EDC by this Ultrasound: " 5/29/25    Assigning a Final WHITNEY  Please choose how you are assigning the WHITNEY: The gestational age by LMP is 9w 0d - 13w 6d and demonstrates 7 or fewer days difference from the gestational age by CRL, therefore the final WHITNEY will be based on the LMP    Final WHITNEY: 6/3/25 by LMP.    Tori Starkey PA-C  [facility] [address]  [address]  [place] [address]  Dept: [phone]  Dept Fax: [phone]  Ultrasound Probe Disinfection    A transvaginal ultrasound was performed.   Prior to use, disinfection was performed with High Level Disinfection Process ([employer]).  Probe serial number: [id number]           Assessment/Plan:         1. 10 weeks gestation of pregnancy  2. Secondary amenorrhea  -     Ambulatory Referral to Maternal Fetal Medicine; Future; Expected date: 11/07/2024  -     AMB US OB < 14 weeks single or first gestation level 1  3. Type 1 diabetes mellitus without complication (HCC)  4. Type 1 diabetes mellitus on insulin therapy (HCC)  Assessment & Plan:  Type 1 DM - last A1C 6.9%; managed by [facility] endocrine; will need referral to [facility] diabetic pathways- placed  Lab Results   Component Value Date    HGBA1C 6.9 (A) 10/15/2024     Orders:  -     Ambulatory Referral to Maternal Fetal Medicine; Future; Expected date: 11/07/2024      Orders Placed This Encounter   Procedures    Ambulatory Referral to Maternal Fetal Medicine    Ambulatory Referral to Maternal Fetal Medicine    AMB US OB < 14 weeks single or first gestation level 1

## 2024-11-06 NOTE — ASSESSMENT & PLAN NOTE
Type 1 DM - last A1C 6.9%; managed by  [facility] endocrine; will need referral to [facility] diabetic pathways- placed  Lab Results   Component Value Date    HGBA1C 6.9 (A) 10/15/2024      Smoking Cessation

## 2024-11-06 NOTE — PROGRESS NOTES
Patient here for early ultrasound.   LMP: 24  GA: 10w1d  WHITNEY: 6/3/25    Planned Pregnancy  Regular Cycles  Denies leaking of fluid, bleeding, cramping, vomiting  Patient has nausea in the morning, but subsides after eating.  Accompanied by Enoc

## 2024-11-07 ENCOUNTER — TELEPHONE (OUTPATIENT)
Age: 34
End: 2024-11-07

## 2024-11-08 NOTE — TELEPHONE ENCOUNTER
Scheduled patient for 12/4 in [place] with Emilia Villavicencio confirmed    Patient is also scheduled for 12/18 @ 8am with Karolina in [place] - she does not  need that appt     Will wait to cancel until she comes into the office

## 2024-11-12 ENCOUNTER — INITIAL PRENATAL (OUTPATIENT)
Dept: OBGYN CLINIC | Facility: CLINIC | Age: 34
End: 2024-11-12

## 2024-11-12 ENCOUNTER — APPOINTMENT (OUTPATIENT)
Dept: LAB | Facility: CLINIC | Age: 34
End: 2024-11-12
Payer: COMMERCIAL

## 2024-11-12 VITALS — BODY MASS INDEX: 37.51 KG/M2 | HEIGHT: 67 IN | WEIGHT: 239 LBS

## 2024-11-12 DIAGNOSIS — Z36.9 ENCOUNTER FOR ANTENATAL SCREENING: ICD-10-CM

## 2024-11-12 DIAGNOSIS — Z31.430 ENCOUNTER OF FEMALE FOR TESTING FOR GENETIC DISEASE CARRIER STATUS FOR PROCREATIVE MANAGEMENT: ICD-10-CM

## 2024-11-12 DIAGNOSIS — Z34.01 ENCOUNTER FOR SUPERVISION OF NORMAL FIRST PREGNANCY IN FIRST TRIMESTER: ICD-10-CM

## 2024-11-12 DIAGNOSIS — Z34.01 ENCOUNTER FOR SUPERVISION OF NORMAL FIRST PREGNANCY IN FIRST TRIMESTER: Primary | ICD-10-CM

## 2024-11-12 LAB
ABO GROUP BLD: NORMAL
ALBUMIN SERPL BCG-MCNC: 4.1 G/DL (ref 3.5–5)
ALP SERPL-CCNC: 75 U/L (ref 34–104)
ALT SERPL W P-5'-P-CCNC: 18 U/L (ref 7–52)
ANION GAP SERPL CALCULATED.3IONS-SCNC: 9 MMOL/L (ref 4–13)
AST SERPL W P-5'-P-CCNC: 17 U/L (ref 13–39)
BACTERIA UR QL AUTO: ABNORMAL /HPF
BASOPHILS # BLD AUTO: 0.02 THOUSANDS/ÂΜL (ref 0–0.1)
BASOPHILS NFR BLD AUTO: 0 % (ref 0–1)
BILIRUB SERPL-MCNC: 0.35 MG/DL (ref 0.2–1)
BILIRUB UR QL STRIP: NEGATIVE
BLD GP AB SCN SERPL QL: NEGATIVE
BUN SERPL-MCNC: 12 MG/DL (ref 5–25)
CALCIUM SERPL-MCNC: 9.2 MG/DL (ref 8.4–10.2)
CAOX CRY URNS QL MICRO: ABNORMAL /HPF
CHLORIDE SERPL-SCNC: 103 MMOL/L (ref 96–108)
CLARITY UR: CLEAR
CO2 SERPL-SCNC: 23 MMOL/L (ref 21–32)
COLOR UR: YELLOW
CREAT SERPL-MCNC: 0.51 MG/DL (ref 0.6–1.3)
CREAT UR-MCNC: 163.5 MG/DL
EOSINOPHIL # BLD AUTO: 0.04 THOUSAND/ÂΜL (ref 0–0.61)
EOSINOPHIL NFR BLD AUTO: 1 % (ref 0–6)
ERYTHROCYTE [DISTWIDTH] IN BLOOD BY AUTOMATED COUNT: 11.6 % (ref 11.6–15.1)
GFR SERPL CREATININE-BSD FRML MDRD: 128 ML/MIN/1.73SQ M
GLUCOSE P FAST SERPL-MCNC: 103 MG/DL (ref 65–99)
GLUCOSE UR STRIP-MCNC: ABNORMAL MG/DL
HBV SURFACE AG SER QL: NORMAL
HCT VFR BLD AUTO: 39.3 % (ref 34.8–46.1)
HCV AB SER QL: NORMAL
HGB BLD-MCNC: 13.4 G/DL (ref 11.5–15.4)
HGB UR QL STRIP.AUTO: ABNORMAL
HIV 1+2 AB+HIV1 P24 AG SERPL QL IA: NORMAL
HIV 2 AB SERPL QL IA: NORMAL
HIV1 AB SERPL QL IA: NORMAL
HIV1 P24 AG SERPL QL IA: NORMAL
IMM GRANULOCYTES # BLD AUTO: 0.02 THOUSAND/UL (ref 0–0.2)
IMM GRANULOCYTES NFR BLD AUTO: 0 % (ref 0–2)
KETONES UR STRIP-MCNC: NEGATIVE MG/DL
LEUKOCYTE ESTERASE UR QL STRIP: NEGATIVE
LYMPHOCYTES # BLD AUTO: 1.6 THOUSANDS/ÂΜL (ref 0.6–4.47)
LYMPHOCYTES NFR BLD AUTO: 23 % (ref 14–44)
MCH RBC QN AUTO: 29.6 PG (ref 26.8–34.3)
MCHC RBC AUTO-ENTMCNC: 34.1 G/DL (ref 31.4–37.4)
MCV RBC AUTO: 87 FL (ref 82–98)
MONOCYTES # BLD AUTO: 0.39 THOUSAND/ÂΜL (ref 0.17–1.22)
MONOCYTES NFR BLD AUTO: 6 % (ref 4–12)
MUCOUS THREADS UR QL AUTO: ABNORMAL
NEUTROPHILS # BLD AUTO: 5.02 THOUSANDS/ÂΜL (ref 1.85–7.62)
NEUTS SEG NFR BLD AUTO: 70 % (ref 43–75)
NITRITE UR QL STRIP: NEGATIVE
NON-SQ EPI CELLS URNS QL MICRO: ABNORMAL /HPF
NRBC BLD AUTO-RTO: 0 /100 WBCS
PH UR STRIP.AUTO: 5.5 [PH]
PLATELET # BLD AUTO: 189 THOUSANDS/UL (ref 149–390)
PMV BLD AUTO: 12.9 FL (ref 8.9–12.7)
POTASSIUM SERPL-SCNC: 3.9 MMOL/L (ref 3.5–5.3)
PROT SERPL-MCNC: 7.3 G/DL (ref 6.4–8.4)
PROT UR STRIP-MCNC: ABNORMAL MG/DL
PROT UR-MCNC: 12.7 MG/DL
PROT/CREAT UR: 0.1 MG/G{CREAT} (ref 0–0.1)
RBC # BLD AUTO: 4.53 MILLION/UL (ref 3.81–5.12)
RBC #/AREA URNS AUTO: ABNORMAL /HPF
RH BLD: POSITIVE
RUBV IGG SERPL IA-ACNC: 46.7 IU/ML
SODIUM SERPL-SCNC: 135 MMOL/L (ref 135–147)
SP GR UR STRIP.AUTO: 1.03 (ref 1–1.03)
TREPONEMA PALLIDUM IGG+IGM AB [PRESENCE] IN SERUM OR PLASMA BY IMMUNOASSAY: NORMAL
URATE SERPL-MCNC: 2.5 MG/DL (ref 2–7.5)
UROBILINOGEN UR STRIP-ACNC: <2 MG/DL
WBC # BLD AUTO: 7.09 THOUSAND/UL (ref 4.31–10.16)
WBC #/AREA URNS AUTO: ABNORMAL /HPF

## 2024-11-12 PROCEDURE — 86803 HEPATITIS C AB TEST: CPT

## 2024-11-12 PROCEDURE — 84550 ASSAY OF BLOOD/URIC ACID: CPT

## 2024-11-12 PROCEDURE — 86901 BLOOD TYPING SEROLOGIC RH(D): CPT

## 2024-11-12 PROCEDURE — 87340 HEPATITIS B SURFACE AG IA: CPT

## 2024-11-12 PROCEDURE — 87389 HIV-1 AG W/HIV-1&-2 AB AG IA: CPT

## 2024-11-12 PROCEDURE — 86900 BLOOD TYPING SEROLOGIC ABO: CPT

## 2024-11-12 PROCEDURE — 36415 COLL VENOUS BLD VENIPUNCTURE: CPT

## 2024-11-12 PROCEDURE — 81001 URINALYSIS AUTO W/SCOPE: CPT

## 2024-11-12 PROCEDURE — 81329 SMN1 GENE DOS/DELETION ALYS: CPT

## 2024-11-12 PROCEDURE — 81220 CFTR GENE COM VARIANTS: CPT

## 2024-11-12 PROCEDURE — 84156 ASSAY OF PROTEIN URINE: CPT

## 2024-11-12 PROCEDURE — 82570 ASSAY OF URINE CREATININE: CPT

## 2024-11-12 PROCEDURE — 86762 RUBELLA ANTIBODY: CPT

## 2024-11-12 PROCEDURE — 85025 COMPLETE CBC W/AUTO DIFF WBC: CPT

## 2024-11-12 PROCEDURE — 86850 RBC ANTIBODY SCREEN: CPT

## 2024-11-12 PROCEDURE — 86780 TREPONEMA PALLIDUM: CPT

## 2024-11-12 PROCEDURE — OBC

## 2024-11-12 PROCEDURE — 87086 URINE CULTURE/COLONY COUNT: CPT

## 2024-11-12 PROCEDURE — 80053 COMPREHEN METABOLIC PANEL: CPT

## 2024-11-12 RX ORDER — INSULIN LISPRO 100 [IU]/ML
10 INJECTION, SOLUTION INTRAVENOUS; SUBCUTANEOUS
COMMUNITY

## 2024-11-12 NOTE — PROGRESS NOTES
OB INTAKE INTERVIEW  Patient is 33 y.o. who presents for OB intake at 11 wks  She is accompanied by her fiance Enoc during this encounter  The father of her baby (Enoc Aceves) is involved in the pregnancy and is 30 years old.      Last Menstrual Period: 24  Ultrasound: Measured 10 weeks 6 days on   Estimated Date of Delivery: 6/3/25 confirmed by 10 week US    Signs/Symptoms of Pregnancy  Current pregnancy symptoms: feeling good!   Constipation YES, discussed hydration and colace PRN  Headaches no  Cramping/spotting no  PICA cravings no    Diabetes-  Body mass index is 38 kg/m².  If patient has 1 or more, please order early 1 hour GTT  History of GDM no  BMI >35 YES- patient is a type 1 diabetic   History of PCOS or current metformin use no  History of LGA/macrosomic infant (4000g/9lbs) no    If patient has 2 or more, please order early 1 hour GTT  BMI>30 YES  AMA no  First degree relative with type 2 diabetes YES  History of chronic HTN, hyperlipidemia, elevated A1C YES  High risk race (, , ,  or ) YES    Hypertension- if you answer yes to any of the following, please order baseline preeclampsia labs (cbc, comprehensive metabolic panel, urine protein creatinine ratio, uric acid)  History of of chronic HTN no  History of gestational HTN no  History of preeclampsia, eclampsia, or HELLP syndrome no  History of diabetes YES  History of lupus,sjogrens syndrome, kidney disease YES, patient is type 1 diabetic     Thyroid- if yes order TSH with reflex T4  History of thyroid disease no    Bleeding Disorder or Hx of DVT-patient or first degree relative with history of. Order the following if not done previously.   (Factor V, antithrombin III, prothrombin gene mutation, protein C and S Ag, lupus anticoagulant, anticardiolipin, beta-2 glycoprotein)   no    OB/GYN-  History of abnormal pap smear no       Date of last pap smear not since 22 yo   History of  HPV no  History of Herpes/HSV no  History of other STI (gonorrhea, chlamydia, trich) YES- chlamydia in the past   History of prior  no  History of prior  no  History of  delivery prior to 36 weeks 6 days no  History of Varicella or Vaccination had both   History of blood transfusion no  Ok for blood transfusion YES    Substance screening-   History of tobacco use no  Currently using tobacco no  Substance Use Screen Level (N/A, LOW, HIGH) NA    MRSA Screening-   Does the pt have a hx of MRSA? no    Immunizations:  Influenza vaccine given this season declined  Discussed Tdap vaccine  yes  Discussed COVID Vaccine no    Genetic/MFM-  Do you or your partner have a history of any of the following in yourselves or first degree relatives?  Cystic fibrosis no  Spinal muscular atrophy no  Hemoglobinopathy/Sickle Cell/Thalassemia no  Fragile X Intellectual Disability no    If yes, discuss Carrier Screening and recommend consultation with MFM/Genetic Counseling and place specific [facility] Referral for.    If no, discuss Carrier Screening being completed once in a lifetime as a standard of care lab test. Place orders for Cystic Fibrosis Gene Test (FZK151) and Spinal Muscular Atrophy DNA ([id number])      Appointment for Nuchal Translucency Ultrasound at [facility] scheduled for       Interview education  [facility] Pregnancy Essentials Book reviewed, discussed and attached to their AVS yes    Nurse/Family Partnership- patient may qualify no; referral placed no    Prenatal lab work scripts yes  Extra labs ordered:  preE  Carrier screening     Aspirin/Preeclampsia Screen    Risk Level Risk Factor Recommendation   LOW Prior Uncomplicated full-term delivery YES No Aspirin recommendation        MODERATE Nulliparity YES Recommend low-dose aspirin if     BMI>30 YES 2 or more moderate risk factors    Family History Preeclampsia (mother/sister) no     35yr old or greater no     Black Race, Concern for SDOH/Low Socioeconomic no      IVF Pregnancy  no     Personal History Risks (low birth weight, prior adverse preg outcome, >10yr preg interval) no         HIGH History of Preeclampsia no Recommend low-dose aspirin if     Multifetal gestation no 1 or more high risk factors    Chronic HTN no     Type 1 or 2 Diabetes YES     Renal Disease no     Autoimmune Disease  no      Contraindications to ASA therapy:  NSAID/ ASA allergy: no  Nasal polyps: no  Asthma with history of ASA induced bronchospasm: no  Relative contraindications:  History of GI bleed: no  Active peptic ulcer disease: no  Severe hepatic dysfunction: no    Patient should be recommended to take ASA 162mg during this pregnancy from 12-36wks to lower her risk of preeclampsia: Patient meets High risk criteria-  aware to start ASA therapy at 12 weeks          The patient has a history now or in prior pregnancy notable for:  class   DM Type 1  and  EPDS-0      Details that I feel the provider should be aware of: This is a planned and welcomed pregnancy for Yu and her significant other Enoc.  She is a new patient to [facility]. This is their first child!. She is overall feeling well so far. She was diagnosed as a type 1 diabetic earlier this year. She is not using the CGM as it was too expensive and she has no problems checking her own sugars through out the day.  Patient is aware of PN1 labs and to have them completed before her next appointment. Baseline preE labs ordered as well due to history of DM.  Carrier screening discussed patient aware of prior authorization and scheduling. Blue folder was  given and reviewed today.       PN1 visit scheduled. The patient was oriented to our practice, the navigator role, reviewed delivering physicians and [facility] for Delivery. All questions were answered.    Interviewed by: Nemo Go RN

## 2024-11-12 NOTE — PATIENT INSTRUCTIONS
Congratulations!! Please review our Pregnancy Essential Guide and [facility] L&D Virtual tour from our networks website.     [facility] Pregnancy Essentials Guide  [facility] ([email or web address])     [facility] - Virtual Tour ([employer])

## 2024-11-13 ENCOUNTER — RESULTS FOLLOW-UP (OUTPATIENT)
Dept: OBGYN CLINIC | Facility: CLINIC | Age: 34
End: 2024-11-13

## 2024-11-13 LAB — BACTERIA UR CULT: NORMAL

## 2024-11-18 LAB
CITATION REF LAB TEST: NORMAL
CLINICAL INFO: NORMAL
ETHNIC BACKGROUND STATED: NORMAL
GENE DIS ANL CARRIER INTERP-IMP: NORMAL
GENE MUT TESTED BLD/T: NORMAL
LAB DIRECTOR NAME PROVIDER: NORMAL
REASON FOR REFERRAL (NARRATIVE): NORMAL
RECOMMENDATION PATIENT DOC-IMP: NORMAL
REF LAB TEST METHOD: NORMAL
SERVICE CMNT-IMP: NORMAL
SMN1 GENE MUT ANL BLD/T: NORMAL
SPECIMEN SOURCE: NORMAL

## 2024-11-22 ENCOUNTER — ROUTINE PRENATAL (OUTPATIENT)
Dept: PERINATAL CARE | Facility: OTHER | Age: 34
End: 2024-11-22
Payer: COMMERCIAL

## 2024-11-22 ENCOUNTER — TELEPHONE (OUTPATIENT)
Dept: OTHER | Facility: OTHER | Age: 34
End: 2024-11-22

## 2024-11-22 VITALS
HEIGHT: 67 IN | SYSTOLIC BLOOD PRESSURE: 106 MMHG | WEIGHT: 240 LBS | BODY MASS INDEX: 37.67 KG/M2 | HEART RATE: 94 BPM | DIASTOLIC BLOOD PRESSURE: 56 MMHG

## 2024-11-22 DIAGNOSIS — O34.10 UTERINE FIBROID IN PREGNANCY: ICD-10-CM

## 2024-11-22 DIAGNOSIS — E66.09 OTHER OBESITY DUE TO EXCESS CALORIES AFFECTING PREGNANCY IN FIRST TRIMESTER: ICD-10-CM

## 2024-11-22 DIAGNOSIS — O99.211 OTHER OBESITY DUE TO EXCESS CALORIES AFFECTING PREGNANCY IN FIRST TRIMESTER: ICD-10-CM

## 2024-11-22 DIAGNOSIS — Z36.82 ENCOUNTER FOR (NT) NUCHAL TRANSLUCENCY SCAN: ICD-10-CM

## 2024-11-22 DIAGNOSIS — E10.9 TYPE 1 DIABETES MELLITUS ON INSULIN THERAPY (HCC): ICD-10-CM

## 2024-11-22 DIAGNOSIS — Z3A.12 12 WEEKS GESTATION OF PREGNANCY: Primary | ICD-10-CM

## 2024-11-22 DIAGNOSIS — D25.9 UTERINE FIBROID IN PREGNANCY: ICD-10-CM

## 2024-11-22 PROCEDURE — 76801 OB US < 14 WKS SINGLE FETUS: CPT | Performed by: STUDENT IN AN ORGANIZED HEALTH CARE EDUCATION/TRAINING PROGRAM

## 2024-11-22 PROCEDURE — 99244 OFF/OP CNSLTJ NEW/EST MOD 40: CPT | Performed by: STUDENT IN AN ORGANIZED HEALTH CARE EDUCATION/TRAINING PROGRAM

## 2024-11-22 PROCEDURE — 76813 OB US NUCHAL MEAS 1 GEST: CPT | Performed by: STUDENT IN AN ORGANIZED HEALTH CARE EDUCATION/TRAINING PROGRAM

## 2024-11-22 PROCEDURE — 36415 COLL VENOUS BLD VENIPUNCTURE: CPT | Performed by: STUDENT IN AN ORGANIZED HEALTH CARE EDUCATION/TRAINING PROGRAM

## 2024-11-22 NOTE — PROGRESS NOTES
Patient chose to have [facility] [id number] Non-Invasive Prenatal Screen [id number] PLUS w/ SCA, WITH fetal sex.  Patient choose to be billed through insurance.     Patient given brochure and is aware [facility] will contact patient's insurance and coordinate coverage.  Provided [facility] contact information. General inquiries [phone], Cost estimates [phone] and [facility] Billing [phone]. Website [email or web address].     Blood collection tubes labeled with patient identifiers (name, medical record number, and date of birth).     Filled out [facility] order form. Patient chose to have blood drawn in our office at time of visit. NIPS was drawn from left arm with a butterfly needle by MATTHEW Dubose. .      If patient chose to have blood work drawn at a [facility] lab we requested patient notify MFM (via phone call or [employer] message) when blood collected so office can follow up on results.       Maternal Fetal Medicine will have results in approximately 5-7 business days and will call patient or notify via [employer].  Patient aware viewing lab result online will reveal fetal sex if ordered.    Patient verbalized understanding of all instructions and no questions at this time.

## 2024-11-22 NOTE — LETTER
"2024     Tori Starkey PA-C  [facility] [address]    Patient: Yu Ariza   YOB: 1990   Date of Visit: 2024       Dear Dr. Starkey:    Thank you for referring Yu Ariza to me for evaluation. Below are my notes for this consultation.    If you have questions, please do not hesitate to call me. I look forward to following your patient along with you.         Sincerely,        Cari Coates MD        CC: No Recipients    Cari Coates MD  2024 10:30 AM  Sign when Signing Visit  [facility]: Ms. Ariza was seen today for nuchal translucency ultrasound.  See ultrasound report under \"OB Procedures\" tab.        Physical Exam  Constitutional:       General: She is not in acute distress.     Appearance: Normal appearance.   HENT:      Head: Normocephalic and atraumatic.   Eyes:      Extraocular Movements: Extraocular movements intact.   Cardiovascular:      Rate and Rhythm: Normal rate.   Pulmonary:      Effort: Pulmonary effort is normal. No respiratory distress.   Skin:     Findings: No erythema or rash.   Neurological:      Mental Status: She is alert and oriented to person, place, and time.   Psychiatric:         Mood and Affect: Mood normal.         Behavior: Behavior normal.         Please don't hesitate to contact our office with any concerns or questions.  -Cari Coates MD    "

## 2024-11-22 NOTE — TELEPHONE ENCOUNTER
Patient is calling regarding cancelling an appointment. Pt is unable to make it due to the weather. Would like the office to give her a call back to reschedule. Stated that an early am appointment on Tuesday 11/26/24 would work.     Date/Time: 11/22/24 1015 am     Patient was rescheduled: YES [ ] NO [ x]     Patient requesting call back to reschedule: YES [ x] NO [ ]

## 2024-11-22 NOTE — PROGRESS NOTES
"[facility]: Ms. Ariza was seen today for nuchal translucency ultrasound.  See ultrasound report under \"OB Procedures\" tab.        Physical Exam  Constitutional:       General: She is not in acute distress.     Appearance: Normal appearance.   HENT:      Head: Normocephalic and atraumatic.   Eyes:      Extraocular Movements: Extraocular movements intact.   Cardiovascular:      Rate and Rhythm: Normal rate.   Pulmonary:      Effort: Pulmonary effort is normal. No respiratory distress.   Skin:     Findings: No erythema or rash.   Neurological:      Mental Status: She is alert and oriented to person, place, and time.   Psychiatric:         Mood and Affect: Mood normal.         Behavior: Behavior normal.         Please don't hesitate to contact our office with any concerns or questions.  -Cari Coates MD    "

## 2024-11-26 ENCOUNTER — TELEMEDICINE (OUTPATIENT)
Facility: HOSPITAL | Age: 34
End: 2024-11-26
Payer: COMMERCIAL

## 2024-11-26 DIAGNOSIS — E66.01 CLASS 2 SEVERE OBESITY DUE TO EXCESS CALORIES WITH SERIOUS COMORBIDITY AND BODY MASS INDEX (BMI) OF 38.0 TO 38.9 IN ADULT (HCC): ICD-10-CM

## 2024-11-26 DIAGNOSIS — Z3A.13 13 WEEKS GESTATION OF PREGNANCY: ICD-10-CM

## 2024-11-26 DIAGNOSIS — E10.9 TYPE 1 DIABETES MELLITUS ON INSULIN THERAPY (HCC): Primary | ICD-10-CM

## 2024-11-26 DIAGNOSIS — E66.812 CLASS 2 SEVERE OBESITY DUE TO EXCESS CALORIES WITH SERIOUS COMORBIDITY AND BODY MASS INDEX (BMI) OF 38.0 TO 38.9 IN ADULT (HCC): ICD-10-CM

## 2024-11-26 LAB
CFDNA.FET/CFDNA.TOTAL SFR FETUS: NORMAL %
CITATION REF LAB TEST: NORMAL
FET 13+18+21+X+Y ANEUP PLAS.CFDNA: NEGATIVE
FET CHR 21 TS PLAS.CFDNA QL: NEGATIVE
FET CHR 21 TS PLAS.CFDNA QL: NEGATIVE
FET MS X RISK WBC.DNA+CFDNA QL: NOT DETECTED
FET SEX PLAS.CFDNA DOSAGE CFDNA: NORMAL
FET TS 13 RISK PLAS.CFDNA QL: NEGATIVE
FET X + Y ANEUP RISK PLAS.CFDNA SEQ-IMP: NOT DETECTED
GA EST FROM CONCEPTION DATE: NORMAL D
GESTATIONAL AGE > 9:: YES
LAB DIRECTOR NAME PROVIDER: NORMAL
LAB DIRECTOR NAME PROVIDER: NORMAL
LABORATORY COMMENT REPORT: NORMAL
LIMITATIONS OF THE TEST: NORMAL
NEGATIVE PREDICTIVE VALUE: NORMAL
PERFORMANCE CHARACTERISTICS: NORMAL
POSITIVE PREDICTIVE VALUE: NORMAL
REF LAB TEST METHOD: NORMAL
SL AMB NOTE:: NORMAL
TEST PERFORMANCE INFO SPEC: NORMAL

## 2024-11-26 PROCEDURE — 99215 OFFICE O/P EST HI 40 MIN: CPT | Performed by: NURSE PRACTITIONER

## 2024-11-26 PROCEDURE — 99417 PROLNG OP E/M EACH 15 MIN: CPT | Performed by: NURSE PRACTITIONER

## 2024-11-26 NOTE — ASSESSMENT & PLAN NOTE
-1st trimester A1c 6.9%. CMP within normal. Baseline UPC 0.10.   -Repeat A1c with next lab.   -A1c goal is less than 6% with minimal hypoglycemia.  -Switch Lantus 40 units daily at 9 PM; starting on 11/27/2024. If issue with hypoglycemia over the weekend, okay to decrease Lantus to 36 units daily.   -Continue Humalog 10-12 units before meals 1-2-3. Try to inject insulin 15 minutes before meal.    -Follow up with dietitian.  -Keep 3 day food log to review with dietitian.  -Self monitoring blood glucose (SMBG) fasting; before meals; 2 hours after start of each meal and with hypoglycemia.   -Glucose goals: fasting 60-90 mg/dL, 140 mg/dL or less 1 hour post meals, and 120 mg/dL or less 2 hours post meal.   -Report glucose readings weekly via [employer] every Tuesday.  -Start GDM meal plan with 3 meals and 3 or 4 snacks including recommended combination of carb, protein and fat per meal/snack.  -Please eat meal or snack every 2-3.5 hours while awake.  -No more than 8 to 10 hours of fasting overnight.  -Refer to [employer] MyPlate online as a reference.  -2nd/3rd trimester minimum total daily carbohydrates 175 grams paired with half grams in protein.   -Stay active if no restriction from your OB, walk up to 30 minutes a day.  -Always have glucose available to treat hypoglycemia. Use 15:15 rule.   -Refer to hypoglycemia patient education sheet. SMBG when experiencing signs and symptoms of hypoglycemia and prior to driving.   -Serial fetal growth ultrasounds.  -20 weeks detailed fetal growth ultrasound.  -22-24 weeks fetal echo.  -At 32 weeks gestation; NST twice a week and WILLAM weekly.   -Continue prenatal vitamin and baby aspirin as recommended.  -At 36 weeks gestation, stop baby aspirin.   -Continue follow-up with your OB and MFM as recommended.  -Stay in close contact with diabetes education team.  -Insulin requirements during pregnancy; basal/bolus concept and Metformin discussed.  -Very important to maintain tight  glucose control during pregnancy to decrease risk factors including fetal macrosomia; birth injury; risk of ; polyhydramnios; pre-term labor; pre-eclampsia;  hypoglycemia; jaundice and stillbirth.   -Diabetes and pregnancy booklet; meal plan and hypoglycemia patient education.       Lab Results   Component Value Date    HGBA1C 6.9 (A) 10/15/2024       Orders:    [facility] glucose flowsheet    Hemoglobin A1C

## 2024-11-26 NOTE — PROGRESS NOTES
Virtual Regular Visit  Name: Yu Ariza      : 1990      MRN: [id number]  Encounter Provider: YEE Crawford  Encounter Date: 2024   Encounter department: [facility]      Verification of patient location:  Patient is located at Home in the following state in which I hold an active license [place] :  Assessment & Plan  Type 1 diabetes mellitus on insulin therapy (HCC)  -1st trimester A1c 6.9%. CMP within normal. Baseline UPC 0.10.   -Repeat A1c with next lab.   -A1c goal is less than 6% with minimal hypoglycemia.  -Switch Lantus 40 units daily at 9 PM; starting on 2024. If issue with hypoglycemia over the weekend, okay to decrease Lantus to 36 units daily.   -Continue Humalog 10-12 units before meals 1-2-3. Try to inject insulin 15 minutes before meal.    -Follow up with dietitian.  -Keep 3 day food log to review with dietitian.  -Self monitoring blood glucose (SMBG) fasting; before meals; 2 hours after start of each meal and with hypoglycemia.   -Glucose goals: fasting 60-90 mg/dL, 140 mg/dL or less 1 hour post meals, and 120 mg/dL or less 2 hours post meal.   -Report glucose readings weekly via [employer] every Tuesday.  -Start GDM meal plan with 3 meals and 3 or 4 snacks including recommended combination of carb, protein and fat per meal/snack.  -Please eat meal or snack every 2-3.5 hours while awake.  -No more than 8 to 10 hours of fasting overnight.  -Refer to [employer] MyPlate online as a reference.  -2nd/3rd trimester minimum total daily carbohydrates 175 grams paired with half grams in protein.   -Stay active if no restriction from your OB, walk up to 30 minutes a day.  -Always have glucose available to treat hypoglycemia. Use 15:15 rule.   -Refer to hypoglycemia patient education sheet. SMBG when experiencing signs and symptoms of hypoglycemia and prior to driving.   -Serial fetal growth ultrasounds.  -20 weeks detailed fetal growth ultrasound.  -  weeks fetal echo.  -At 32 weeks gestation; NST twice a week and WILLAM weekly.   -Continue prenatal vitamin and baby aspirin as recommended.  -At 36 weeks gestation, stop baby aspirin.   -Continue follow-up with your OB and MFM as recommended.  -Stay in close contact with diabetes education team.  -Insulin requirements during pregnancy; basal/bolus concept and Metformin discussed.  -Very important to maintain tight glucose control during pregnancy to decrease risk factors including fetal macrosomia; birth injury; risk of ; polyhydramnios; pre-term labor; pre-eclampsia;  hypoglycemia; jaundice and stillbirth.   -Diabetes and pregnancy booklet; meal plan and hypoglycemia patient education.       Lab Results   Component Value Date    HGBA1C 6.9 (A) 10/15/2024       Orders:    [facility] glucose flowsheet    Hemoglobin A1C    13 weeks gestation of pregnancy         Class 2 severe obesity due to excess calories with serious comorbidity and body mass index (BMI) of 38.0 to 38.9 in adult (HCC)  -Pre-pregnancy weight 239 lbs.  -Current weight 240 lbs.  -Recommended weight gain 11-20 lbs.  -Start GDM meal plan and follow up with dietitian.            Encounter provider YEE Crawford    The patient was identified by name and date of birth. Yu Ariza was informed that this is a telemedicine visit and that the visit is being conducted through the Epic Embedded platform. She agrees to proceed..  My office door was closed. No one else was in the room.  She acknowledged consent and understanding of privacy and security of the video platform. The patient has agreed to participate and understands they can discontinue the visit at any time.    Patient is aware this is a billable service.     History of Present Illness   Yu is a 34 you  female, 13 0/7 weeks gestation T1DM, history of DKA with diagnosis. Last A1c 6.9%. Positive PREET. Normal C-peptide. Followed by endocrinology. On Lantus 40 units in AM and  Humalog 10-12 units before meals 1-2-3.   Wakes up at 4:30 AM on work days; clocks in at 5:30 AM; might snack on crackers around 6 AM; 9 AM breakfast-oatmeal and eggs; 12 PM-Activia yogurt, fruit, granola with or without crackers; 3:30 PM water; crackers or nuts or yogurt or jello; 6:30-7 PM dinner-chicken with salad or pizza night or soup; bedtime 9 PM.   Days off wakes up at 7 AM; breakfast at 8 AM-cheese scramble with arepa; might have a snack or not; lunch 1-2 PM-sandwich or tuna salad or chicken with salad; dinner 5 PM-steak or chicken with salad and rice or lasagna or pizza; 7 to 8 PM snack-nuts or yogurt or cheese or cereal; bedtime 10-10:30 PM.   Works 3 12 hour shifts a week. Active on days off.   HPI  Review of Systems   Constitutional:  Negative for fatigue and fever.   HENT:  Negative for congestion and trouble swallowing.    Eyes:  Negative for visual disturbance.        Last eye exam early this year Jan or Feb. Needs eye exam.    Respiratory:  Negative for cough and shortness of breath.    Cardiovascular:  Negative for chest pain, palpitations and leg swelling.   Gastrointestinal:  Positive for constipation. Negative for nausea and vomiting.   Endocrine: Negative for polydipsia, polyphagia and polyuria.   Genitourinary:  Negative for difficulty urinating and vaginal bleeding.   Musculoskeletal:  Negative for back pain.   Skin:  Negative for rash.   Neurological:  Negative for headaches.   Psychiatric/Behavioral:  Negative for sleep disturbance.        Objective   LMP 08/27/2024 (Exact Date)     Date Fasting Post-  breakfast Pre-  lunch Pre-  dinner Comments   11/23 121 90 105 95    11/24 140 95 107 101 Fasting at 4:30 AM   11/25 126 93 110 105    11/16 116 92                                         Physical Exam  Constitutional:       Appearance: She is obese.   HENT:      Head: Normocephalic.      Nose: Nose normal.   Eyes:      Conjunctiva/sclera: Conjunctivae normal.   Pulmonary:      Effort:  Pulmonary effort is normal.   Neurological:      Mental Status: She is alert and oriented to person, place, and time.   Psychiatric:         Mood and Affect: Mood normal.         Behavior: Behavior normal.         Thought Content: Thought content normal.         Judgment: Judgment normal.         Visit Time  Total Visit Duration: 75 minutes with patient and 15 minutes charting/reviewing chart.

## 2024-11-26 NOTE — ASSESSMENT & PLAN NOTE
-Pre-pregnancy weight 239 lbs.  -Current weight 240 lbs.  -Recommended weight gain 11-20 lbs.  -Start GDM meal plan and follow up with dietitian.

## 2024-11-26 NOTE — PATIENT INSTRUCTIONS
-1st trimester A1c 6.9%. CMP within normal. Baseline UPC 0.10.   -Repeat A1c with next lab.   -A1c goal is less than 6% with minimal hypoglycemia.  -Switch Lantus 40 units daily at 9 PM; starting on 11/27/2024. If issue with hypoglycemia over the weekend, okay to decrease Lantus to 36 units daily.   -Continue Humalog 10-12 units before meals 1-2-3. Try to inject insulin 15 minutes before meal.    -Follow up with dietitian.  -Keep 3 day food log to review with dietitian.  -Self monitoring blood glucose (SMBG) fasting; before meals; 2 hours after start of each meal and with hypoglycemia.   -Glucose goals: fasting 60-90 mg/dL, 140 mg/dL or less 1 hour post meals, and 120 mg/dL or less 2 hours post meal.   -Report glucose readings weekly via [employer] every Tuesday.  -Start GDM meal plan with 3 meals and 3 or 4 snacks including recommended combination of carb, protein and fat per meal/snack.  -Please eat meal or snack every 2-3.5 hours while awake.  -No more than 8 to 10 hours of fasting overnight.  -Refer to [employer] MyPlate online as a reference.  -2nd/3rd trimester minimum total daily carbohydrates 175 grams paired with half grams in protein.   -Stay active if no restriction from your OB, walk up to 30 minutes a day.  -Always have glucose available to treat hypoglycemia. Use 15:15 rule.   -Refer to hypoglycemia patient education sheet. SMBG when experiencing signs and symptoms of hypoglycemia and prior to driving.   -Serial fetal growth ultrasounds.  -20 weeks detailed fetal growth ultrasound.  -22-24 weeks fetal echo.  -At 32 weeks gestation; NST twice a week and WILLAM weekly.   -Continue prenatal vitamin and baby aspirin as recommended.  -At 36 weeks gestation, stop baby aspirin.   -Continue follow-up with your OB and MFM as recommended.  -Stay in close contact with diabetes education team.  -Insulin requirements during pregnancy; basal/bolus concept and Metformin discussed.  -Very important to maintain tight  glucose control during pregnancy to decrease risk factors including fetal macrosomia; birth injury; risk of ; polyhydramnios; pre-term labor; pre-eclampsia;  hypoglycemia; jaundice and stillbirth.   -Diabetes and pregnancy booklet; meal plan and hypoglycemia patient education.

## 2024-11-27 ENCOUNTER — RESULTS FOLLOW-UP (OUTPATIENT)
Dept: PERINATAL CARE | Facility: CLINIC | Age: 34
End: 2024-11-27

## 2024-11-27 LAB
CFTR FULL MUT ANL BLD/T SEQ: NORMAL
CITATION REF LAB TEST: NORMAL
CLINICAL INFO: NORMAL
ETHNIC BACKGROUND STATED: NORMAL
GENE DIS ANL CARRIER INTERP-IMP: NORMAL
INDICATION: NORMAL
LAB DIRECTOR NAME PROVIDER: NORMAL
RECOMMENDATION PATIENT DOC-IMP: NORMAL
REF LAB TEST METHOD: NORMAL
SERVICE CMNT-IMP: NORMAL
SPECIMEN SOURCE: NORMAL

## 2024-12-03 ENCOUNTER — TELEMEDICINE (OUTPATIENT)
Facility: HOSPITAL | Age: 34
End: 2024-12-03
Payer: COMMERCIAL

## 2024-12-03 ENCOUNTER — TELEPHONE (OUTPATIENT)
Facility: HOSPITAL | Age: 34
End: 2024-12-03

## 2024-12-03 DIAGNOSIS — Z3A.14 14 WEEKS GESTATION OF PREGNANCY: ICD-10-CM

## 2024-12-03 DIAGNOSIS — O24.011 TYPE 1 DIABETES MELLITUS DURING PREGNANCY IN FIRST TRIMESTER: Primary | ICD-10-CM

## 2024-12-03 DIAGNOSIS — N91.1 SECONDARY AMENORRHEA: ICD-10-CM

## 2024-12-03 PROCEDURE — G0108 DIAB MANAGE TRN  PER INDIV: HCPCS | Performed by: DIETITIAN, REGISTERED

## 2024-12-03 NOTE — PROGRESS NOTES
CLASS 2 - Individual  (virtual visit)    Thank you for referring your patient to [facility] Maternal Fetal Medicine Diabetes and Pregnancy Program.     Yu Ariza is a  34 y.o. female who presents today unaccompanied for Virtual Regular Visit, Patient Education (Class 2), Diabetes Type 1 (14w0d), and Virtual Regular Visit. Physician Assistant, Yu Mcclellan present for observation of visit.  Patient is at 14w0d gestation, Estimated Date of Delivery: 6/3/25.     Visit Diagnosis:  Encounter Diagnosis     ICD-10-CM    1. Type 1 diabetes mellitus during pregnancy in first trimester  O24.011       2. 14 weeks gestation of pregnancy  Z3A.14       3. Secondary amenorrhea  N91.1 Ambulatory Referral to Maternal Fetal Medicine           Reviewed and updated the following from patients medical record:  Allergies, and Current Medications.    Labs  GDM LABS: See Class 1 Note    A1C:  Lab Results   Component Value Date/Time    HGBA1C 6.9 (A) 10/15/2024 11:33 AM    HGBA1C 7.4 (H) 05/23/2024 08:09 AM    HGBA1C 16.0 (H) 02/27/2024 01:20 PM        Labs Ordered This Visit: None    Current Medications: Patient reports during visit that she is not currently taking Humalog with breakfast due to concerns over hypoglycemia (last taken with breakfast on 11/26/24; continues to take with lunch/dinner based on sliding scale); Currently taking Lantus 38 units in the evening at bedtime (9pm) - Reviewed with Maryann Tyler following visit, plan to continue to hold Humalog with breakfast, consistently test blood sugars 6 times daily and send message to Diabetes team on Thursday 12/5 to reassess starting dose of Humalog with breakfast. Educator called patient to discuss recommendation, she verbalizes good understanding. Instructed patient to upload Humalog insulin dose to Glucose Flowsheet.    Current Outpatient Medications:     Insulin Glargine Solostar (Lantus SoloStar) 100 UNIT/ML SOPN, Inject 0.38 mL (38 Units total) under the skin daily  "in the early morning (Patient taking differently: Inject 38 Units under the skin daily at bedtime), Disp: 30 mL, Rfl: 1    Alcohol Swabs 70 % PADS, May substitute brand based on insurance coverage. Check glucose ACHS., Disp: 200 each, Rfl: 0    atorvastatin (LIPITOR) 40 mg tablet, TAKE 1 TABLET BY MOUTH ONCE DAILY WITH SUPPER (Patient not taking: Reported on 2024), Disp: 30 tablet, Rfl: 5    BABY ASPIRIN PO, Take 162 mg by mouth daily, Disp: , Rfl:     Blood Glucose Monitoring Suppl (OneTouch Verio Reflect) w/Device KIT, May substitute brand based on insurance coverage. Check glucose ACHS., Disp: 1 kit, Rfl: 0    Continuous Glucose Sensor (Dexcom G7 Sensor), Use 1 Device every 10 days, Disp: 9 each, Rfl: 1    Glucagon, rDNA, (Glucagon Emergency) 1 MG KIT, INJECT 1 ML SUBCUTANEOUSLY ONCE DAILY AS NEEDED FOR SEVERE HYPOGLYCEMIA, Disp: , Rfl:     glucose blood (OneTouch Verio) test strip, May substitute brand based on insurance coverage. Check glucose ACHS., Disp: 200 each, Rfl: 0    insulin lispro (HumALOG/ADMELOG) 100 units/mL injection, Inject 2-12 Units under the skin 3 (three) times a day before meals, Disp: 30 mL, Rfl: 1    insulin lispro (HumALOG/ADMELOG) 100 units/mL injection, Inject 10 Units under the skin 3 (three) times a day with meals, Disp: , Rfl:     Insulin Pen Needle (BD Pen Needle Luz 2nd Gen) 32G X 4 MM MISC, USE WITH INSULIN PEN, Disp: 100 each, Rfl: 1    OneTouch Delica Lancets 33G MISC, May substitute brand based on insurance coverage. Check glucose ACHS., Disp: 200 each, Rfl: 0    Prenatal Multivit-Min-Fe-FA (PRE- PO), Take by mouth daily, Disp: , Rfl:      Anthropometrics:  Ht Readings from Last 1 Encounters:   24 5' 6.5\" (1.689 m)      Wt Readings from Last 3 Encounters:   24 109 kg (240 lb)   24 108 kg (239 lb)   24 109 kg (239 lb 6.4 oz)        Pre-Gravid Wt Pre-Gravid BMI TWG   108 kg (239 lb) 38.00 0 kg (0 lb)     Total Pregnancy Weight Gain " Recommendations: BMI (> 30) 11-20 lbs  Current Wt Status Compared to Recommendations: *Women should gain 2-5lbs in first trimester; Rate of wt gain in 2nd and 3rd trimester depends on TWG recommendations based on pregravid BMI and height    Most Recent Ultrasound Results:  Findings:  Nuchal Translucency 24; NML Growth  Further Fetal Surveillance:  Fetal Echo scheduled  24  Next US date: Scheduled Appropriately - Early Anatomy 24    BLOOD GLUCOSE MONITORING:   Glucometer: OneTouch Verio Flex     Reinforced at Today's Visit:   Timing/Frequency of SMB times per day before and 2 hrs after the start of each meal  Goals: (Fasting) 60-90mg/dL // (2hr PP) <120mg/dL  Reporting Guidelines: Weekly via Phone: [phone] OR My Chart (Message with image attachment) OR Glucose Flowsheet  Method of Reporting: [employer] Glucose Flowsheet    BG LOG:       Review of Blood Glucose Log:   Indicates fair glycemic control  FBG =  Overall improved FBG since Lantus switched to evening dose   Post-Prandial BG =  Missing several readings including after breakfast, after lunch - Reinforced the importance of testing 6 times daily to assess need for insulin adjustments. Patient verbalizes understanding.    DEXCOM: Patient has not picked up Dexcom sensors at this time due to cost; Discussed [employer] website with patient - Instructions sent via [employer] message and option for Dexcom G7 sample to be provided at next office visit.       MEAL PLAN (Patient was provided with a meal plan including 3 meals and 3 snacks at class 1)  *Calories: 2200 calorie (CHO:[id number]) (PRO: 3-2-3/4-2-3/4-2) - Work days: (Wakes up at 4:30am, Bedtime 9pm); Days off (Wakes up at 7am, Bedtime:10-10:30pm)    Review of Patient's Current Diet: refer to class 1 note for additional details    Work Day (Woke up at 4:30am)  Snack (Peanut Butter Crackers - 14 grams carbohydrate OR Belvita Crackers (OR Tracy Soup - 13 grams carbohydrate, Arepa with  cheese - 13 grams carb, 3 grams protein)  Breakfast (9 am): 1 packet Oatmeal (Splenda), 1 Boiled Egg  Lunch: (12:30pm) Activia Yogurt, Apple, Peanut Butter Crackers (44 grams carbohydrate)  PM Snack: (3:30pm) Crackers or Nuts  Dinner: (7pm) Cheeseburger (Potato Bun), 2 Tsp Ketchup, Lettuce, Cheese, Tomato , 1 cup French Fries, Water  Bedtime Snack(9pm): 1/4 Boost Glucose Control     Beverages: No Sugar Sweetened Beverages - Drinks 30 oz water (2-3 x day); Coffee (Whole milk)  Dining Out Frequency:  Bobby Zach, Pizza (Sausage, Egg, Cheese, Croissant)      Meal Plan Recommendations Compliant? Comments:    Consistent CHO Intake Yes  Reviewed carbohydrate counting/reading food labels for accurate estimate of carbohydrate intake at meals   3 Meals and 3 Snacks Yes  AM snack typically before breakfast due to work schedule   Protein w/ Every Meal and Snack Yes  Discussed including PB fit with oatmeal to increase protein content   Eating every 2-3.5hrs while awake  Yes  May be exceeding 3.5 hours in the afternoon; Encouraged patient to pack non-perishable snacks for work   8-10hrs Fasting (from time of bedtime snack until first meal of the day) Yes          Overall Impression: Pt has a good compliance and understanding of diet recommendations at this time.    Reinforced Diet Instructions:  Individualized meal plan.   Importance of consistent carbohydrate intake via 3 meals and 3 snacks per day   Importance of protein as it relates to blood glucose control.   Encouraged  patient to eat every 2.0-3.5 hours while awake  Encouraged patient to go no longer than 8-10 hours fasting overnight until first meal of the day.  Provided suggested meal/snack options to increase nutrition and maintain consistent meal and snack intakes.    Physical Activity:  Currently physically active?  Active on days off    Reviewed w/ Pt:   Benefits of physical activity to optimize blood glucose control, encouraged activity at patient is physically  "able.   Instructed pt to always consult a physician prior to starting an exercise program.   Recommend 20-30 minutes daily.    Additional Topics Reviewed:      Maternal-Fetal Testing:   Ultrasounds: growth scans every 4 weeks.  NST: twice weekly starting at 32nd week GA  WILLAM:  weekly starting at 32 weeks GA  Hypoglycemia & Treatment Guidelines:  Reviewed what hypoglycemia is, signs and symptoms, and how to treat via the 15:15 rule.  Dining Out & Travel Guidelines:  Patient advised to be prepared with extra diabetes supplies, medications, and snacks, as well as sticking to the same time schedule and portions eaten at home for meals and snacks.    Patient Stated Goal:  \"I will check my blood sugars 6 times daily - fasting/before meals/2 hours after meals.\"  Goal Assessment:  Reinforced goals today    Diabetes Self Management Support Plan outside of ongoing care: Spouse/Family    Barriers to Learning/Change: No Barriers  Expected Compliance: very good    Date to report blood sugars: Weekly   Follow up:  Return in about 5 weeks (around 2025) for Visit w/ YEE Crawford.     Begin Time: 8:29am  End Time: 9:44am    It was a pleasure working with them today. Please feel free to call ([phone]) with any questions or concerns.    Vani Doan RD   Diabetes Educator  [facility] Maternal Fetal Medicine  Diabetes and Pregnancy Program    Virtual Regular Visit  Name: Yu Ariza      : 1990      MRN: [id number]  Encounter Provider: Vani Doan RD  Encounter Date: 12/3/2024   Encounter department: [facility]  [facility]      Verification of patient location:  Patient is located at Home in the following state in which I [place] an active license [place] :  Assessment & Plan  14 weeks gestation of pregnancy         Type 1 diabetes mellitus during pregnancy in first trimester    Lab Results   Component Value Date    HGBA1C 6.9 (A) 10/15/2024            Secondary amenorrhea    Orders:    Ambulatory " Referral to Maternal Fetal Medicine    14 weeks gestation of pregnancy         Type 1 diabetes mellitus during pregnancy in first trimester    Lab Results   Component Value Date    HGBA1C 6.9 (A) 10/15/2024          Secondary amenorrhea               Encounter provider Vani Doan RD    The patient was identified by name and date of birth. Yu Ariza was informed that this is a telemedicine visit and that the visit is being conducted through the Epic Embedded platform. She agrees to proceed..  My office door was closed. The patient was notified the following individuals were present in the room BUDDY Jackson.  She acknowledged consent and understanding of privacy and security of the video platform. The patient has agreed to participate and understands they can discontinue the visit at any time.    Patient is aware this is a billable service.     History of Present Illness     HPI  Review of Systems    Objective   LMP 08/27/2024 (Exact Date)     Physical Exam    Visit Time  Total Visit Duration: 75 minutes

## 2024-12-03 NOTE — TELEPHONE ENCOUNTER
Reason for Call: Diabetes Type 1 (14w0d) and Medication Management (Lantus/Humalog); Humalog last taken with breakfast on 11/26/24 (Held per patient due to concerns for hypoglycemia); Continues to take Humalog with lunch/dinner (Meal 2/3) per orders.    Outcome: Called patient; patient answered. Educator discussed recommendations per Maryann Tyler to continue to hold breakfast Humalog insulin; Noted glucose flowsheet with missing post breakfast readings, patient reports forgetting to test post breakfast readings for several days. Instructed patient to test blood sugars 6 times daily (Fasting/Before Meals/2 Hours after meals); send message to Diabetes team on Thursday 12/5/24 once post breakfast reading taken to assess resuming Humalog with breakfast and for recommendations on dose. Patient verbalizes understanding. Patient also verifies that she increased Lantus to 40 units on 11/23 for one day; reduced to 38 units the next day due to concerns for hypoglycemia. Lantus switched from morning to 9pm timing on 11/27/24.    Vani Doan, MPH, RDN, LDN, CDCES  Diabetes Educator   [facility]  Diabetes and Pregnancy Program

## 2024-12-04 ENCOUNTER — INITIAL PRENATAL (OUTPATIENT)
Dept: OBGYN CLINIC | Facility: CLINIC | Age: 34
End: 2024-12-04
Payer: COMMERCIAL

## 2024-12-04 VITALS
BODY MASS INDEX: 38.73 KG/M2 | HEIGHT: 66 IN | SYSTOLIC BLOOD PRESSURE: 124 MMHG | DIASTOLIC BLOOD PRESSURE: 78 MMHG | WEIGHT: 241 LBS

## 2024-12-04 DIAGNOSIS — E66.01 CLASS 2 SEVERE OBESITY DUE TO EXCESS CALORIES WITH SERIOUS COMORBIDITY AND BODY MASS INDEX (BMI) OF 38.0 TO 38.9 IN ADULT (HCC): ICD-10-CM

## 2024-12-04 DIAGNOSIS — Z34.01 ENCOUNTER FOR SUPERVISION OF NORMAL FIRST PREGNANCY IN FIRST TRIMESTER: ICD-10-CM

## 2024-12-04 DIAGNOSIS — O24.011 PRE-EXISTING TYPE 1 DIABETES MELLITUS DURING PREGNANCY IN FIRST TRIMESTER: Primary | ICD-10-CM

## 2024-12-04 DIAGNOSIS — N63.23 MASS OF LOWER OUTER QUADRANT OF LEFT BREAST: ICD-10-CM

## 2024-12-04 DIAGNOSIS — Z11.3 SCREENING FOR STDS (SEXUALLY TRANSMITTED DISEASES): ICD-10-CM

## 2024-12-04 DIAGNOSIS — Z3A.14 14 WEEKS GESTATION OF PREGNANCY: ICD-10-CM

## 2024-12-04 DIAGNOSIS — E66.812 CLASS 2 SEVERE OBESITY DUE TO EXCESS CALORIES WITH SERIOUS COMORBIDITY AND BODY MASS INDEX (BMI) OF 38.0 TO 38.9 IN ADULT (HCC): ICD-10-CM

## 2024-12-04 LAB
SL AMB  POCT GLUCOSE, UA: NORMAL
SL AMB POCT URINE PROTEIN: NORMAL

## 2024-12-04 PROCEDURE — 81002 URINALYSIS NONAUTO W/O SCOPE: CPT

## 2024-12-04 PROCEDURE — 87491 CHLMYD TRACH DNA AMP PROBE: CPT

## 2024-12-04 PROCEDURE — G0476 HPV COMBO ASSAY CA SCREEN: HCPCS

## 2024-12-04 PROCEDURE — PNV

## 2024-12-04 PROCEDURE — 87591 N.GONORRHOEAE DNA AMP PROB: CPT

## 2024-12-04 PROCEDURE — G0145 SCR C/V CYTO,THINLAYER,RESCR: HCPCS

## 2024-12-04 NOTE — PROGRESS NOTES
Name: Yu Ariza      : 1990      MRN: [id number]  Encounter Provider: Emilia Scott PA-C  Encounter Date: 2024   Encounter department: [facility]  :  Assessment & Plan  Pre-existing type 1 diabetes mellitus during pregnancy in first trimester    Lab Results   Component Value Date    HGBA1C 6.9 (A) 10/15/2024   DM diagnosed 2024, established with endocrinology on insulin.  Patient is being managed by DP throughout the pregnancy. Patient has been monitoring and sending her blood sugars to the DP.  Continue ASA  Routine anatomy ultrasound and fetal echo are recommended per MFM  Serial growth eval and twice weekly antepartum fetal testing in 3rd trimester   Timing of delivery based on glycemic control and consideration of other comorbidities    14 weeks gestation of pregnancy  14w 1 d, doing well, no complaints or concerns    POCT urine -/-  Labs currently available were reviewed  Declined flu shot  Discussed the importance of folic acid supplementation for reducing the risk of neural tube defects and aiding in placental/fetal growth. Also discussed the importance of iron supplementation. Instructed patient to take a prenatal multivitamin specifically containing folic acid (0.4-0.8 mg/daily) and iron.  Discussed the importance of a well balanced diet and discussed foods to avoid/limit during pregnancy  Patient can continue to exercise  Patient should avoid hot tubs or saunas during the first trimester due to the increased risk of neural tube defects  Patient feels safe at home  Seat belt use discussed  Childbirth classes/hospital facilities discussed    Encounter for supervision of normal first pregnancy in first trimester  Patient should call if she experiences: vaginal bleeding, change in discharge, LOF, contractions or dysuria.  Discussed that pregnancy can increase their risk for blood clots. Discussed the warning signs: acute SOB, calf/leg  pain, chest pain  Orders:    Liquid-based pap, screening    POCT urine dip    Screening for STDs (sexually transmitted diseases)    Orders:    Chlamydia/GC amplified DNA by PCR    Mass of lower outer quadrant of left breast  Mobile non-tender pea sized mass 6:00.  left breast ultrasound ordered  Class 2 severe obesity due to excess calories with serious comorbidity and body mass index (BMI) of 38.0 to 38.9 in adult (HCC)    Pre pregnancy weight 239 lbs  Recommended weight gain 11-20 lbs  TWlbs          History of Present Illness     HPI  Yu Ariza is a 34 y.o.  at 14w0d with Estimated Date of Delivery: 6/3/25 by LMP    She denies nausea/vomiting and bleeding/cramping. Patient has no complaints or concerns at this time.    Prenatal labs: WNL    Genetic screening: NIPS low risk. CF gene test and SMA DNA negative    OB history: First pregnancy    GYN history:Patient does not recall when her last pap was.  Past history chlamydia    Past medical history: type I DM    Past surgical history: none    Social history:  Denies tobacco, alcohol, or illicit drug use.    Family history:   DVT/PE: None  Cancer: paternal grandfather colon cancer  Heart disease: father HTN, HLD, type II DM, Mother HTN, maternal grandmother heart disease/heart attack  Stroke: none    Review of Systems   Constitutional:  Negative for chills, fatigue, fever and unexpected weight change.   Eyes:  Negative for visual disturbance.   Respiratory:  Negative for shortness of breath.    Cardiovascular:  Negative for chest pain and palpitations.   Gastrointestinal:  Positive for constipation. Negative for abdominal pain, anal bleeding, blood in stool, diarrhea, nausea and vomiting.   Endocrine: Negative for cold intolerance, heat intolerance, polydipsia, polyphagia and polyuria.   Genitourinary:  Negative for difficulty urinating, dyspareunia, dysuria, frequency, hematuria, pelvic pain, urgency, vaginal bleeding, vaginal discharge and vaginal  pain.   Musculoskeletal:  Negative for back pain.   Skin:  Negative for rash.   Neurological:  Negative for dizziness, syncope, light-headedness and headaches.   Hematological:  Does not bruise/bleed easily.   Psychiatric/Behavioral:  Negative for dysphoric mood. The patient is not nervous/anxious.      Current Outpatient Medications on File Prior to Visit   Medication Sig Dispense Refill    Alcohol Swabs 70 % PADS May substitute brand based on insurance coverage. Check glucose ACHS. 200 each 0    BABY ASPIRIN PO Take 162 mg by mouth daily      Blood Glucose Monitoring Suppl (OneTouch Verio Reflect) w/Device KIT May substitute brand based on insurance coverage. Check glucose ACHS. 1 kit 0    Continuous Glucose Sensor (Dexcom G7 Sensor) Use 1 Device every 10 days 9 each 1    Glucagon, rDNA, (Glucagon Emergency) 1 MG KIT INJECT 1 ML SUBCUTANEOUSLY ONCE DAILY AS NEEDED FOR SEVERE HYPOGLYCEMIA      glucose blood (OneTouch Verio) test strip May substitute brand based on insurance coverage. Check glucose ACHS. 200 each 0    Insulin Glargine Solostar (Lantus SoloStar) 100 UNIT/ML SOPN Inject 0.38 mL (38 Units total) under the skin daily in the early morning 30 mL 1    insulin lispro (HumALOG/ADMELOG) 100 units/mL injection Inject 2-12 Units under the skin 3 (three) times a day before meals 30 mL 1    insulin lispro (HumALOG/ADMELOG) 100 units/mL injection Inject 10 Units under the skin 3 (three) times a day with meals      Insulin Pen Needle (BD Pen Needle Luz 2nd Gen) 32G X 4 MM MISC USE WITH INSULIN  each 1    OneTouch Delica Lancets 33G MISC May substitute brand based on insurance coverage. Check glucose ACHS. 200 each 0    Prenatal Multivit-Min-Fe-FA (PRE-ARYAN PO) Take by mouth daily      atorvastatin (LIPITOR) 40 mg tablet TAKE 1 TABLET BY MOUTH ONCE DAILY WITH SUPPER (Patient not taking: Reported on 2024) 30 tablet 5     No current facility-administered medications on file prior to visit.      Social  "History     Tobacco Use    Smoking status: Never    Smokeless tobacco: Never   Vaping Use    Vaping status: Never Used   Substance and Sexual Activity    Alcohol use: Not Currently    Drug use: Never    Sexual activity: Yes     Partners: Male     Birth control/protection: None        Objective   /78 (BP Location: Right arm, Patient Position: Sitting, Cuff Size: Large)   Ht 5' 6\" (1.676 m)   Wt 109 kg (241 lb)   LMP 08/27/2024 (Exact Date)   BMI 38.90 kg/m²     Fetal Heart Rate: 155     Physical Exam  Vitals and nursing note reviewed.   Constitutional:       General: She is not in acute distress.     Appearance: She is well-developed.   HENT:      Head: Normocephalic and atraumatic.   Eyes:      Extraocular Movements: Extraocular movements intact.   Pulmonary:      Effort: Pulmonary effort is normal.   Chest:   Breasts:     Right: No swelling, bleeding, inverted nipple, mass, nipple discharge, skin change or tenderness.      Left: No swelling, bleeding, inverted nipple, mass, nipple discharge, skin change or tenderness.       Abdominal:      Palpations: Abdomen is soft.      Tenderness: There is no abdominal tenderness.      Hernia: There is no hernia in the left inguinal area or right inguinal area.   Genitourinary:     General: Normal vulva.      Exam position: Lithotomy position.      Pubic Area: No rash.       Labia:         Right: No rash, tenderness or lesion.         Left: No rash, tenderness or lesion.       Urethra: No urethral pain or urethral swelling.      Vagina: No vaginal discharge, erythema, tenderness, bleeding or lesions.      Cervix: No cervical motion tenderness, discharge, friability, lesion, erythema or cervical bleeding.      Uterus: Not tender. Enlarged: Gravid uterus.      Adnexa:         Right: No mass, tenderness or fullness.          Left: No mass, tenderness or fullness.        Rectum: Normal.   Lymphadenopathy:      Upper Body:      Right upper body: No supraclavicular, " axillary or pectoral adenopathy.      Left upper body: No supraclavicular, axillary or pectoral adenopathy.      Lower Body: No right inguinal adenopathy. No left inguinal adenopathy.   Skin:     General: Skin is warm and dry.   Neurological:      Mental Status: She is alert.   Psychiatric:         Mood and Affect: Mood normal.         Behavior: Behavior normal.         Emilia Scott PA-C

## 2024-12-05 LAB
HPV HR 12 DNA CVX QL NAA+PROBE: NEGATIVE
HPV16 DNA CVX QL NAA+PROBE: NEGATIVE
HPV18 DNA CVX QL NAA+PROBE: NEGATIVE

## 2024-12-06 LAB
C TRACH DNA SPEC QL NAA+PROBE: NEGATIVE
N GONORRHOEA DNA SPEC QL NAA+PROBE: NEGATIVE

## 2024-12-09 ENCOUNTER — RESULTS FOLLOW-UP (OUTPATIENT)
Dept: OBGYN CLINIC | Facility: CLINIC | Age: 34
End: 2024-12-09

## 2024-12-09 LAB
LAB AP GYN PRIMARY INTERPRETATION: NORMAL
LAB AP LMP: NORMAL
Lab: NORMAL

## 2024-12-10 ENCOUNTER — HOSPITAL ENCOUNTER (OUTPATIENT)
Dept: ULTRASOUND IMAGING | Facility: CLINIC | Age: 34
Discharge: HOME | End: 2024-12-10
Payer: COMMERCIAL

## 2024-12-10 DIAGNOSIS — N63.23 MASS OF LOWER OUTER QUADRANT OF LEFT BREAST: ICD-10-CM

## 2024-12-10 PROCEDURE — 76642 ULTRASOUND BREAST LIMITED: CPT

## 2024-12-19 ENCOUNTER — TELEPHONE (OUTPATIENT)
Dept: OBGYN CLINIC | Facility: CLINIC | Age: 34
End: 2024-12-19

## 2024-12-20 ENCOUNTER — TELEPHONE (OUTPATIENT)
Dept: PERINATAL CARE | Facility: OTHER | Age: 34
End: 2024-12-20

## 2024-12-20 NOTE — TELEPHONE ENCOUNTER
Lvm for patient to call the office to reschedule the 1/17/25 level two ultrasound appointment @ [facility] due to the office now being closed.

## 2024-12-21 PROBLEM — Z3A.17 17 WEEKS GESTATION OF PREGNANCY: Status: ACTIVE | Noted: 2024-12-21

## 2024-12-21 PROBLEM — O99.212 OBESITY AFFECTING PREGNANCY IN SECOND TRIMESTER: Status: ACTIVE | Noted: 2024-12-21

## 2024-12-27 ENCOUNTER — ROUTINE PRENATAL (OUTPATIENT)
Dept: PERINATAL CARE | Facility: OTHER | Age: 34
End: 2024-12-27
Payer: COMMERCIAL

## 2024-12-27 VITALS
HEIGHT: 66 IN | WEIGHT: 246.6 LBS | DIASTOLIC BLOOD PRESSURE: 60 MMHG | HEART RATE: 96 BPM | SYSTOLIC BLOOD PRESSURE: 126 MMHG | BODY MASS INDEX: 39.63 KG/M2

## 2024-12-27 DIAGNOSIS — Z3A.17 17 WEEKS GESTATION OF PREGNANCY: Primary | ICD-10-CM

## 2024-12-27 DIAGNOSIS — E10.9 TYPE 1 DIABETES MELLITUS ON INSULIN THERAPY (HCC): ICD-10-CM

## 2024-12-27 DIAGNOSIS — O34.10 UTERINE FIBROID IN PREGNANCY: ICD-10-CM

## 2024-12-27 DIAGNOSIS — D25.9 UTERINE FIBROID IN PREGNANCY: ICD-10-CM

## 2024-12-27 DIAGNOSIS — Z36.89 ENCOUNTER FOR FETAL ANATOMIC SURVEY: ICD-10-CM

## 2024-12-27 DIAGNOSIS — O99.212 OBESITY AFFECTING PREGNANCY IN SECOND TRIMESTER, UNSPECIFIED OBESITY TYPE: ICD-10-CM

## 2024-12-27 PROCEDURE — 99213 OFFICE O/P EST LOW 20 MIN: CPT | Performed by: STUDENT IN AN ORGANIZED HEALTH CARE EDUCATION/TRAINING PROGRAM

## 2024-12-27 PROCEDURE — 76805 OB US >/= 14 WKS SNGL FETUS: CPT | Performed by: STUDENT IN AN ORGANIZED HEALTH CARE EDUCATION/TRAINING PROGRAM

## 2024-12-27 NOTE — PROGRESS NOTES
"[facility]: Ms. Ariza was seen today for anatomic survey ultrasound.  See ultrasound report under \"OB Procedures\" tab.      MDM:   I. Diagnoses/Problems addressed:  low  II.  Data:  limited  III.  Risk of morbidity: Low    Please don't hesitate to contact our office with any concerns or questions.  -Cari Coates MD    "

## 2024-12-31 PROBLEM — N92.6 MISSED PERIOD: Status: RESOLVED | Noted: 2024-03-26 | Resolved: 2024-12-31

## 2024-12-31 PROBLEM — E66.9 OBESITY (BMI 30-39.9): Status: RESOLVED | Noted: 2024-03-26 | Resolved: 2024-12-31

## 2025-01-02 ENCOUNTER — ROUTINE PRENATAL (OUTPATIENT)
Age: 35
End: 2025-01-02
Payer: COMMERCIAL

## 2025-01-02 VITALS
WEIGHT: 250 LBS | HEIGHT: 66 IN | BODY MASS INDEX: 40.18 KG/M2 | DIASTOLIC BLOOD PRESSURE: 86 MMHG | SYSTOLIC BLOOD PRESSURE: 128 MMHG

## 2025-01-02 DIAGNOSIS — Z3A.18 18 WEEKS GESTATION OF PREGNANCY: ICD-10-CM

## 2025-01-02 DIAGNOSIS — O99.212 OBESITY AFFECTING PREGNANCY IN SECOND TRIMESTER, UNSPECIFIED OBESITY TYPE: ICD-10-CM

## 2025-01-02 DIAGNOSIS — Z36.9 PRENATAL SCREENING ENCOUNTER: ICD-10-CM

## 2025-01-02 DIAGNOSIS — E10.9 TYPE 1 DIABETES MELLITUS ON INSULIN THERAPY (HCC): ICD-10-CM

## 2025-01-02 DIAGNOSIS — Z34.02 ENCOUNTER FOR SUPERVISION OF NORMAL FIRST PREGNANCY IN SECOND TRIMESTER: Primary | ICD-10-CM

## 2025-01-02 LAB
SL AMB  POCT GLUCOSE, UA: NORMAL
SL AMB POCT URINE PROTEIN: NORMAL

## 2025-01-02 PROCEDURE — PNV: Performed by: NURSE PRACTITIONER

## 2025-01-02 PROCEDURE — 81002 URINALYSIS NONAUTO W/O SCOPE: CPT | Performed by: NURSE PRACTITIONER

## 2025-01-02 NOTE — PROGRESS NOTES
Problem   18 Weeks Gestation of Pregnancy    On ASA    Type1 DM on Insulin-sugars are excellent per pt report-maintaining contact with DP  Fibroid    TWG 4.99 kg (11 lb)    Pap/GC/CT neg in 2024  msAFP declined  Flu declined, advised precautions    NIPS low risk  L2 on 1/31       18 weeks gestation of pregnancy  Feels well. Here with FOB. Denies LOF/CTX/VB. Discussed fetal awareness. She is experiencing neck pain from a cracked tooth that will be getting fixed in Feb. She was advised Tylenol, heat, ice,massage, lido patch etc. No other concerns. Overview charting updated today.

## 2025-01-02 NOTE — ASSESSMENT & PLAN NOTE
Feels well. Here with FOB. Denies LOF/CTX/VB. Discussed fetal awareness. She is experiencing neck pain from a cracked tooth that will be getting fixed in Feb. She was advised Tylenol, heat, ice,massage, lido patch etc. No other concerns. Overview charting updated today.

## 2025-01-06 ENCOUNTER — PATIENT MESSAGE (OUTPATIENT)
Dept: OBGYN CLINIC | Facility: CLINIC | Age: 35
End: 2025-01-06

## 2025-01-14 ENCOUNTER — TELEMEDICINE (OUTPATIENT)
Facility: HOSPITAL | Age: 35
End: 2025-01-14
Payer: COMMERCIAL

## 2025-01-14 VITALS — WEIGHT: 250 LBS | BODY MASS INDEX: 40.18 KG/M2 | HEIGHT: 66 IN

## 2025-01-14 DIAGNOSIS — E66.813 CLASS 3 SEVERE OBESITY DUE TO EXCESS CALORIES WITH SERIOUS COMORBIDITY AND BODY MASS INDEX (BMI) OF 40.0 TO 44.9 IN ADULT (HCC): ICD-10-CM

## 2025-01-14 DIAGNOSIS — E66.01 CLASS 3 SEVERE OBESITY DUE TO EXCESS CALORIES WITH SERIOUS COMORBIDITY AND BODY MASS INDEX (BMI) OF 40.0 TO 44.9 IN ADULT (HCC): ICD-10-CM

## 2025-01-14 DIAGNOSIS — Z3A.20 20 WEEKS GESTATION OF PREGNANCY: ICD-10-CM

## 2025-01-14 DIAGNOSIS — E10.9 TYPE 1 DIABETES MELLITUS ON INSULIN THERAPY (HCC): Primary | ICD-10-CM

## 2025-01-14 PROCEDURE — 99215 OFFICE O/P EST HI 40 MIN: CPT | Performed by: NURSE PRACTITIONER

## 2025-01-14 NOTE — PROGRESS NOTES
Virtual Regular Visit  Name: Yu Ariza      : 1990      MRN: [id number]  Encounter Provider: YEE Crawford  Encounter Date: 2025   Encounter department: [facility]      Verification of patient location:  Patient is located at Home in the following state in which I hold an active license [place] :  Assessment & Plan  Type 1 diabetes mellitus on insulin therapy (HCC)  -1st trimester A1c 6.9%. CMP within normal. Baseline UPC 0.10.   -Repeat A1c, CMP and UPCR.    -A1c goal is less than 6% with minimal hypoglycemia.  -Glucose levels well controlled on current regimen.  -Continue Lantus 38 units daily at 9 PM.  -Continue Humalog 10 units before meals 1-2-3. Try to inject insulin 15 minutes before meal.    -Follow up with dietitian as needed.  -Keep 3 day food log to review with dietitian.  -Self monitoring blood glucose (SMBG) fasting; before meals; 2 hours after start of each meal and with hypoglycemia.   -Glucose goals: fasting 60-90 mg/dL, 140 mg/dL or less 1 hour post meals, and 120 mg/dL or less 2 hours post meal.   -Report glucose readings weekly via [employer] every Tuesday.  -Continue GDM meal plan with 3 meals and 3 or 4 snacks including recommended combination of carb, protein and fat per meal/snack.  -Please eat meal or snack every 2-3.5 hours while awake.  -No more than 8 to 10 hours of fasting overnight.  -Refer to [employer] MyPlate online as a reference.  -2nd/3rd trimester minimum total daily carbohydrates 175 grams paired with half grams in protein.   -Stay active if no restriction from your OB, walk up to 30 minutes a day.  -Always have glucose available to treat hypoglycemia. Use 15:15 rule.   -Refer to hypoglycemia patient education sheet. SMBG when experiencing signs and symptoms of hypoglycemia and prior to driving.   -Serial fetal growth ultrasounds.  -20 weeks detailed fetal growth ultrasound.  -22-24 weeks fetal echo.  -At 32 weeks gestation; NST  twice a week and WILLAM weekly.   -Continue prenatal vitamin and baby aspirin as recommended.  -At 36 weeks gestation, stop baby aspirin.   -Continue follow-up with your OB and MFM as recommended.  -Stay in close contact with diabetes education team.  -Schedule follow up with endocrinologist for end of 2025.   Lab Results   Component Value Date    HGBA1C 6.9 (A) 10/15/2024       Orders:    Comprehensive metabolic panel; Future    Hemoglobin A1C; Future    Protein / creatinine ratio, urine; Future    20 weeks gestation of pregnancy    Orders:    Comprehensive metabolic panel; Future    Hemoglobin A1C; Future    Protein / creatinine ratio, urine; Future    Class 3 severe obesity due to excess calories with serious comorbidity and body mass index (BMI) of 40.0 to 44.9 in adult (HCC)  -Pre-pregnancy weight 239 lbs.  -Current weight 250 lbs.  -TWG 11 lbs.   -Recommended weight gain 11-20 lbs.  -Continue GDM meal plan and follow up with dietitian as needed.    Orders:    Comprehensive metabolic panel; Future    Hemoglobin A1C; Future    Protein / creatinine ratio, urine; Future        Encounter provider YEE Crawford    The patient was identified by name and date of birth. Yu Ariza was informed that this is a telemedicine visit and that the visit is being conducted through the Epic Embedded platform. She agrees to proceed..  My office door was closed. No one else was in the room.  She acknowledged consent and understanding of privacy and security of the video platform. The patient has agreed to participate and understands they can discontinue the visit at any time.    Patient is aware this is a billable service.     History of Present Illness   Yu is a 35 yo  female, 20 0/7 weeks gestation with T1DM on Lantus 38 units at 9 PM daily and Humalog 10-10-10 units before meals 1-2-3. Denies hypoglycemia. Testing fasting, before meals and 2 hours post meal readings with readings within normal. Following  "GDM meal plan. Staying active. Currently dealing with a dental issue and needs a root canal. Up to date weight gain 11 lbs within goal. BP within normal.   HPI  Review of Systems   Constitutional:  Negative for fatigue and fever.   HENT:  Negative for congestion and trouble swallowing.    Eyes:  Negative for visual disturbance.        Needs eye exam.    Respiratory:  Negative for cough and shortness of breath.    Cardiovascular:  Negative for chest pain, palpitations and leg swelling.   Gastrointestinal:  Negative for constipation, nausea and vomiting.   Endocrine: Negative for polydipsia, polyphagia and polyuria.   Genitourinary:  Negative for difficulty urinating and vaginal bleeding.   Musculoskeletal:  Negative for back pain.   Skin:  Negative for rash.   Neurological:  Negative for numbness and headaches.   Psychiatric/Behavioral:  Negative for sleep disturbance.        Objective   Ht 5' 6\" (1.676 m)   Wt 113 kg (250 lb)   LMP 08/27/2024 (Exact Date)   BMI 40.35 kg/m²   Refer to glucose flowsheet; readings within goal for pregnancy.   Physical Exam  HENT:      Head: Normocephalic.      Nose: Nose normal.   Eyes:      Conjunctiva/sclera: Conjunctivae normal.   Pulmonary:      Effort: Pulmonary effort is normal.   Neurological:      Mental Status: She is alert and oriented to person, place, and time.   Psychiatric:         Mood and Affect: Mood normal.         Behavior: Behavior normal.         Thought Content: Thought content normal.         Judgment: Judgment normal.         Visit Time  Total Visit Duration: 37 minutes with patient and 10 minutes charting/reviewing chart.   "

## 2025-01-14 NOTE — ASSESSMENT & PLAN NOTE
-1st trimester A1c 6.9%. CMP within normal. Baseline UPC 0.10.   -Repeat A1c, CMP and UPCR.    -A1c goal is less than 6% with minimal hypoglycemia.  -Glucose levels well controlled on current regimen.  -Continue Lantus 38 units daily at 9 PM.  -Continue Humalog 10 units before meals 1-2-3. Try to inject insulin 15 minutes before meal.    -Follow up with dietitian as needed.  -Keep 3 day food log to review with dietitian.  -Self monitoring blood glucose (SMBG) fasting; before meals; 2 hours after start of each meal and with hypoglycemia.   -Glucose goals: fasting 60-90 mg/dL, 140 mg/dL or less 1 hour post meals, and 120 mg/dL or less 2 hours post meal.   -Report glucose readings weekly via [employer] every Tuesday.  -Continue GDM meal plan with 3 meals and 3 or 4 snacks including recommended combination of carb, protein and fat per meal/snack.  -Please eat meal or snack every 2-3.5 hours while awake.  -No more than 8 to 10 hours of fasting overnight.  -Refer to [employer] Success MyPlate online as a reference.  -2nd/3rd trimester minimum total daily carbohydrates 175 grams paired with half grams in protein.   -Stay active if no restriction from your OB, walk up to 30 minutes a day.  -Always have glucose available to treat hypoglycemia. Use 15:15 rule.   -Refer to hypoglycemia patient education sheet. SMBG when experiencing signs and symptoms of hypoglycemia and prior to driving.   -Serial fetal growth ultrasounds.  -20 weeks detailed fetal growth ultrasound.  -22-24 weeks fetal echo.  -At 32 weeks gestation; NST twice a week and WILLAM weekly.   -Continue prenatal vitamin and baby aspirin as recommended.  -At 36 weeks gestation, stop baby aspirin.   -Continue follow-up with your OB and MFM as recommended.  -Stay in close contact with diabetes education team.  -Schedule follow up with endocrinologist for end of June 2025.   Lab Results   Component Value Date    HGBA1C 6.9 (A) 10/15/2024       Orders:    Comprehensive metabolic  panel; Future    Hemoglobin A1C; Future    Protein / creatinine ratio, urine; Future

## 2025-01-14 NOTE — PATIENT INSTRUCTIONS
-1st trimester A1c 6.9%. CMP within normal. Baseline UPC 0.10.   -Repeat A1c, CMP and UPCR.    -A1c goal is less than 6% with minimal hypoglycemia.  -Glucose levels well controlled on current regimen.  -Continue Lantus 38 units daily at 9 PM.  -Continue Humalog 10 units before meals 1-2-3. Try to inject insulin 15 minutes before meal.    -Follow up with dietitian as needed.  -Keep 3 day food log to review with dietitian.  -Self monitoring blood glucose (SMBG) fasting; before meals; 2 hours after start of each meal and with hypoglycemia.   -Glucose goals: fasting 60-90 mg/dL, 140 mg/dL or less 1 hour post meals, and 120 mg/dL or less 2 hours post meal.   -Report glucose readings weekly via [employer] every Tuesday.  -Continue GDM meal plan with 3 meals and 3 or 4 snacks including recommended combination of carb, protein and fat per meal/snack.  -Please eat meal or snack every 2-3.5 hours while awake.  -No more than 8 to 10 hours of fasting overnight.  -Refer to [employer] MyPlate online as a reference.  -2nd/3rd trimester minimum total daily carbohydrates 175 grams paired with half grams in protein.   -Stay active if no restriction from your OB, walk up to 30 minutes a day.  -Always have glucose available to treat hypoglycemia. Use 15:15 rule.   -Refer to hypoglycemia patient education sheet. SMBG when experiencing signs and symptoms of hypoglycemia and prior to driving.   -Serial fetal growth ultrasounds.  -20 weeks detailed fetal growth ultrasound.  -22-24 weeks fetal echo.  -At 32 weeks gestation; NST twice a week and WILLAM weekly.   -Continue prenatal vitamin and baby aspirin as recommended.  -At 36 weeks gestation, stop baby aspirin.   -Continue follow-up with your OB and MFM as recommended.  -Stay in close contact with diabetes education team.  -Schedule follow up with endocrinologist for end of June 2025.

## 2025-01-14 NOTE — ASSESSMENT & PLAN NOTE
-Pre-pregnancy weight 239 lbs.  -Current weight 250 lbs.  -TWG 11 lbs.   -Recommended weight gain 11-20 lbs.  -Continue GDM meal plan and follow up with dietitian as needed.    Orders:    Comprehensive metabolic panel; Future    Hemoglobin A1C; Future    Protein / creatinine ratio, urine; Future

## 2025-01-14 NOTE — ASSESSMENT & PLAN NOTE
Orders:    Comprehensive metabolic panel; Future    Hemoglobin A1C; Future    Protein / creatinine ratio, urine; Future

## 2025-01-16 ENCOUNTER — ROUTINE PRENATAL (OUTPATIENT)
Dept: PERINATAL CARE | Facility: OTHER | Age: 35
End: 2025-01-16
Payer: COMMERCIAL

## 2025-01-16 VITALS
WEIGHT: 252 LBS | HEIGHT: 66 IN | DIASTOLIC BLOOD PRESSURE: 56 MMHG | BODY MASS INDEX: 40.5 KG/M2 | SYSTOLIC BLOOD PRESSURE: 102 MMHG | HEART RATE: 97 BPM

## 2025-01-16 DIAGNOSIS — D25.9 UTERINE FIBROID IN PREGNANCY: ICD-10-CM

## 2025-01-16 DIAGNOSIS — E10.9 TYPE 1 DIABETES MELLITUS ON INSULIN THERAPY (HCC): Primary | ICD-10-CM

## 2025-01-16 DIAGNOSIS — Z3A.20 20 WEEKS GESTATION OF PREGNANCY: ICD-10-CM

## 2025-01-16 DIAGNOSIS — O99.212 OBESITY AFFECTING PREGNANCY IN SECOND TRIMESTER, UNSPECIFIED OBESITY TYPE: ICD-10-CM

## 2025-01-16 DIAGNOSIS — Z36.86 ENCOUNTER FOR ANTENATAL SCREENING FOR CERVICAL LENGTH: ICD-10-CM

## 2025-01-16 DIAGNOSIS — O34.10 UTERINE FIBROID IN PREGNANCY: ICD-10-CM

## 2025-01-16 DIAGNOSIS — Z36.89 ENCOUNTER FOR FETAL ANATOMIC SURVEY: ICD-10-CM

## 2025-01-16 PROCEDURE — 76811 OB US DETAILED SNGL FETUS: CPT | Performed by: OBSTETRICS & GYNECOLOGY

## 2025-01-16 PROCEDURE — 76817 TRANSVAGINAL US OBSTETRIC: CPT | Performed by: OBSTETRICS & GYNECOLOGY

## 2025-01-16 PROCEDURE — 99214 OFFICE O/P EST MOD 30 MIN: CPT | Performed by: OBSTETRICS & GYNECOLOGY

## 2025-01-16 NOTE — PROGRESS NOTES
"[facility]: Yu Ariza was seen today for anatomic survey and cervical length screening ultrasound.  See ultrasound report under \"OB Procedures\" tab.   The time spent on this established patient on the encounter date included 6 minutes previsit service time reviewing records and precharting, 8 minutes face-to-face service time counseling regarding results and coordinating care, and  7 minutes charting, totalling 21 minutes.  Please don't hesitate to contact our office with any concerns or questions.  -Isabel White MD    "

## 2025-01-17 ENCOUNTER — APPOINTMENT (OUTPATIENT)
Dept: LAB | Facility: CLINIC | Age: 35
End: 2025-01-17
Payer: COMMERCIAL

## 2025-01-17 DIAGNOSIS — Z3A.20 20 WEEKS GESTATION OF PREGNANCY: ICD-10-CM

## 2025-01-17 DIAGNOSIS — E66.813 CLASS 3 SEVERE OBESITY DUE TO EXCESS CALORIES WITH SERIOUS COMORBIDITY AND BODY MASS INDEX (BMI) OF 40.0 TO 44.9 IN ADULT (HCC): ICD-10-CM

## 2025-01-17 DIAGNOSIS — Z36.9 PRENATAL SCREENING ENCOUNTER: ICD-10-CM

## 2025-01-17 DIAGNOSIS — E10.9 TYPE 1 DIABETES MELLITUS ON INSULIN THERAPY (HCC): ICD-10-CM

## 2025-01-17 DIAGNOSIS — E66.01 CLASS 3 SEVERE OBESITY DUE TO EXCESS CALORIES WITH SERIOUS COMORBIDITY AND BODY MASS INDEX (BMI) OF 40.0 TO 44.9 IN ADULT (HCC): ICD-10-CM

## 2025-01-17 LAB
ALBUMIN SERPL BCG-MCNC: 3.9 G/DL (ref 3.5–5)
ALP SERPL-CCNC: 77 U/L (ref 34–104)
ALT SERPL W P-5'-P-CCNC: 19 U/L (ref 7–52)
ANION GAP SERPL CALCULATED.3IONS-SCNC: 7 MMOL/L (ref 4–13)
AST SERPL W P-5'-P-CCNC: 14 U/L (ref 13–39)
BILIRUB SERPL-MCNC: 0.26 MG/DL (ref 0.2–1)
BUN SERPL-MCNC: 13 MG/DL (ref 5–25)
CALCIUM SERPL-MCNC: 9.1 MG/DL (ref 8.4–10.2)
CHLORIDE SERPL-SCNC: 101 MMOL/L (ref 96–108)
CO2 SERPL-SCNC: 26 MMOL/L (ref 21–32)
CREAT SERPL-MCNC: 0.43 MG/DL (ref 0.6–1.3)
CREAT UR-MCNC: 64.7 MG/DL
EST. AVERAGE GLUCOSE BLD GHB EST-MCNC: 114 MG/DL
GFR SERPL CREATININE-BSD FRML MDRD: 135 ML/MIN/1.73SQ M
GLUCOSE P FAST SERPL-MCNC: 108 MG/DL (ref 65–99)
HBA1C MFR BLD: 5.6 %
POTASSIUM SERPL-SCNC: 4.4 MMOL/L (ref 3.5–5.3)
PROT SERPL-MCNC: 6.9 G/DL (ref 6.4–8.4)
PROT UR-MCNC: 5.2 MG/DL
PROT/CREAT UR: 0.1 MG/G{CREAT} (ref 0–0.1)
SODIUM SERPL-SCNC: 134 MMOL/L (ref 135–147)

## 2025-01-17 PROCEDURE — 84156 ASSAY OF PROTEIN URINE: CPT

## 2025-01-17 PROCEDURE — 82105 ALPHA-FETOPROTEIN SERUM: CPT

## 2025-01-17 PROCEDURE — 82570 ASSAY OF URINE CREATININE: CPT

## 2025-01-17 PROCEDURE — 80053 COMPREHEN METABOLIC PANEL: CPT

## 2025-01-17 PROCEDURE — 36415 COLL VENOUS BLD VENIPUNCTURE: CPT

## 2025-01-20 ENCOUNTER — RESULTS FOLLOW-UP (OUTPATIENT)
Facility: HOSPITAL | Age: 35
End: 2025-01-20

## 2025-01-20 NOTE — RESULT ENCOUNTER NOTE
A1c 5.6% normal improved from 1st trimester A1c 6.9%. will continue to monitor glucose readings during current pregnancy.

## 2025-01-21 LAB
2ND TRIMESTER 4 SCREEN SERPL-IMP: NORMAL
AFP ADJ MOM SERPL: 1.01
AFP INTERP AMN-IMP: NORMAL
AFP INTERP SERPL-IMP: NORMAL
AFP INTERP SERPL-IMP: NORMAL
AFP SERPL-MCNC: 43.9 NG/ML
AGE AT DELIVERY: 32.5 YR
GA METHOD: NORMAL
GA: 20.4 WEEKS
IDDM PATIENT QL: NO
MULTIPLE PREGNANCY: NO
NEURAL TUBE DEFECT RISK FETUS: NORMAL %

## 2025-01-23 PROBLEM — Z3A.21 21 WEEKS GESTATION OF PREGNANCY: Status: ACTIVE | Noted: 2024-12-21

## 2025-01-23 NOTE — PATIENT INSTRUCTIONS
Patient Education     Pregnancy - The Fifth Month   About this topic   It is important for you to learn how to take care of yourself to help you have a healthy baby and safe delivery. It is good to have health care throughout your pregnancy.  The fifth month of your pregnancy starts around week 19 and lasts through week 23. By knowing how far along you are, you can learn what is normal for this stage of your pregnancy and plan for what is next.  General   Growth and Development   During the fifth month of your pregnancy, here are some things you can expect.  You may:  Start to gain a little more weight. It is normal to gain about 10 to 15 pounds (4.5 to 7 kg) total in your first 5 months.  Have leg cramps. Be sure to drink plenty of water.  Have loose teeth.  Have more trouble breathing or with heartburn as your baby gets bigger  Start having [place] contractions. You may feel your belly squeezing or getting tight. Be sure to drink 6 to 8 glasses of water each day.  Notice you are able to express milk from your breasts  See stretch marks on your belly, breasts, or legs. Stay active to try and keep good muscle tone.  Be checked for gestational diabetes  Your baby's growth and development:  Your baby is covered with a thick whitish substance called vernix. This helps protect your baby’s skin.  Their genitals are able to be seen on an ultrasound. Your doctor will check your baby’s size, how much fluid there is around your baby, and all of your baby’s organs with the ultrasound.  Your baby is starting to be able to see, hear, taste, and feel touch.  The bones are starting to make blood cells.  Your baby is about 11 inches (28 cm) long and weighs about 1 pound (450 gm). Your baby is about the size of a grapefruit.  Things to Think About   Avoid alcohol, drugs, tobacco products, and second hand smoke  Do not clean your cat litter box. You could get a disease that causes birth defects to your baby.  Check with your  doctor before taking any kind of drugs. Continue to take your vitamin with folic acid.  Do you plan to breastfeed your baby?  Where will you deliver? Do you plan to take prenatal classes? If so, try to have them completed by your 8th month.  Start to think about your plans for pain control during labor.  You may want to learn about cord blood banking.  Rest and take breaks each day.  When do I need to call the doctor?   Contractions every 10 minutes or more often that do not go away with drinking water or position changes  Low, dull back pain that does not go away  Pressure in your pelvis that feels like your baby is pushing down  A gush or constant trickle of watery or bloody fluid leaking from your vagina  Cramps in your lower belly that come and go or are constant  Little to no movement felt by baby in 2 hours. Your baby should move at least 10 times every 2 hours.  Headache that does not go away, blurry vision, seeing spots or halos, increase in swelling in your hands, feet, or face, and pain under your ribs on the right side  Fever of 100.4°F (38°C) or higher  Bleeding or swelling of your gums  Urinating less, or having pain when you urinate  Vaginal bleeding with or without pain  After a car accident, fall, or any trauma to your belly  Having thoughts of harming yourself or others, or do not feel safe at home  Last Reviewed Date   2020-04-20  Consumer Information Use and Disclaimer   This generalized information is a limited summary of diagnosis, treatment, and/or medication information. It is not meant to be comprehensive and should be used as a tool to help the user understand and/or assess potential diagnostic and treatment options. It does NOT include all information about conditions, treatments, medications, side effects, or risks that may apply to a specific patient. It is not intended to be medical advice or a substitute for the medical advice, diagnosis, or treatment of a health care provider based on  the health care provider's examination and assessment of a patient’s specific and unique circumstances. Patients must speak with a health care provider for complete information about their health, medical questions, and treatment options, including any risks or benefits regarding use of medications. This information does not endorse any treatments or medications as safe, effective, or approved for treating a specific patient. [employer] and its affiliates disclaim any warranty or liability relating to this information or the use thereof. The use of this information is governed by the Terms of Use, available at https://www.[employer]uwer.com/en/know/clinical-effectiveness-terms   Copyright   Copyright © 2024 [employer], [employer] and its affiliates and/or licensors. All rights reserved.

## 2025-01-24 PROBLEM — O24.019 TYPE 1 DIABETES MELLITUS AFFECTING PREGNANCY, ANTEPARTUM: Status: ACTIVE | Noted: 2025-01-24

## 2025-01-27 DIAGNOSIS — E13.10 DIABETIC KETOACIDOSIS WITHOUT COMA ASSOCIATED WITH OTHER SPECIFIED DIABETES MELLITUS (HCC): ICD-10-CM

## 2025-01-27 RX ORDER — INSULIN GLARGINE 100 [IU]/ML
38 INJECTION, SOLUTION SUBCUTANEOUS
Qty: 15 ML | Refills: 0 | Status: SHIPPED | OUTPATIENT
Start: 2025-01-27

## 2025-01-28 ENCOUNTER — ROUTINE PRENATAL (OUTPATIENT)
Age: 35
End: 2025-01-28
Payer: COMMERCIAL

## 2025-01-28 VITALS
WEIGHT: 256 LBS | BODY MASS INDEX: 41.14 KG/M2 | HEIGHT: 66 IN | DIASTOLIC BLOOD PRESSURE: 84 MMHG | SYSTOLIC BLOOD PRESSURE: 118 MMHG

## 2025-01-28 DIAGNOSIS — E66.01 CLASS 3 SEVERE OBESITY DUE TO EXCESS CALORIES WITH SERIOUS COMORBIDITY AND BODY MASS INDEX (BMI) OF 40.0 TO 44.9 IN ADULT (HCC): ICD-10-CM

## 2025-01-28 DIAGNOSIS — E10.9 TYPE 1 DIABETES MELLITUS ON INSULIN THERAPY (HCC): ICD-10-CM

## 2025-01-28 DIAGNOSIS — Z34.02 ENCOUNTER FOR SUPERVISION OF NORMAL FIRST PREGNANCY IN SECOND TRIMESTER: Primary | ICD-10-CM

## 2025-01-28 DIAGNOSIS — E66.813 CLASS 3 SEVERE OBESITY DUE TO EXCESS CALORIES WITH SERIOUS COMORBIDITY AND BODY MASS INDEX (BMI) OF 40.0 TO 44.9 IN ADULT (HCC): ICD-10-CM

## 2025-01-28 DIAGNOSIS — Z3A.21 21 WEEKS GESTATION OF PREGNANCY: ICD-10-CM

## 2025-01-28 LAB
SL AMB  POCT GLUCOSE, UA: NORMAL
SL AMB POCT URINE PROTEIN: NORMAL

## 2025-01-28 PROCEDURE — PNV: Performed by: NURSE PRACTITIONER

## 2025-01-28 PROCEDURE — 81002 URINALYSIS NONAUTO W/O SCOPE: CPT | Performed by: NURSE PRACTITIONER

## 2025-01-28 NOTE — PROGRESS NOTES
Problem   21 Weeks Gestation of Pregnancy    On ASA    Type1 DM on Insulin-sugars are excellent per pt report-maintaining contact with DP, Insulin 38LA, 10SA  Fibroid    TWG 7.711 kg (17 lb)    Pap/GC/CT neg in 2024  msAFP declined  Flu declined, advised precautions    NIPS low risk  L2 on 1/31  28 wk labs-order at 26 wk visit (CBC and RPR only)       21 weeks gestation of pregnancy  Feels well. Denies LOF/CTX/VB. Discussed fetal awareness. No concerns. Overview charting updated today.

## 2025-01-31 ENCOUNTER — ROUTINE PRENATAL (OUTPATIENT)
Dept: PERINATAL CARE | Facility: OTHER | Age: 35
End: 2025-01-31
Payer: COMMERCIAL

## 2025-01-31 VITALS
WEIGHT: 254 LBS | BODY MASS INDEX: 40.82 KG/M2 | DIASTOLIC BLOOD PRESSURE: 60 MMHG | HEIGHT: 66 IN | SYSTOLIC BLOOD PRESSURE: 114 MMHG | HEART RATE: 99 BPM

## 2025-01-31 DIAGNOSIS — O24.019 TYPE 1 DIABETES MELLITUS AFFECTING PREGNANCY, ANTEPARTUM: Primary | ICD-10-CM

## 2025-01-31 DIAGNOSIS — Z3A.22 22 WEEKS GESTATION OF PREGNANCY: ICD-10-CM

## 2025-01-31 PROCEDURE — 93325 DOPPLER ECHO COLOR FLOW MAPG: CPT | Performed by: OBSTETRICS & GYNECOLOGY

## 2025-01-31 PROCEDURE — 76827 ECHO EXAM OF FETAL HEART: CPT | Performed by: OBSTETRICS & GYNECOLOGY

## 2025-01-31 PROCEDURE — 76825 ECHO EXAM OF FETAL HEART: CPT | Performed by: OBSTETRICS & GYNECOLOGY

## 2025-02-07 NOTE — PROGRESS NOTES
The patient was seen today for an ultrasound.  Please see ultrasound report (located under Ob Procedures) for additional details.   Thank you very much for allowing us to participate in the care of this very nice patient.  Should you have any questions, please do not hesitate to contact me.     Philip Paredes MD FACOG  Attending Physician, Maternal-Fetal Medicine  [facility]

## 2025-02-20 ENCOUNTER — ROUTINE PRENATAL (OUTPATIENT)
Age: 35
End: 2025-02-20
Payer: COMMERCIAL

## 2025-02-20 VITALS
DIASTOLIC BLOOD PRESSURE: 62 MMHG | HEART RATE: 99 BPM | BODY MASS INDEX: 41.17 KG/M2 | SYSTOLIC BLOOD PRESSURE: 102 MMHG | WEIGHT: 256.2 LBS | HEIGHT: 66 IN

## 2025-02-20 DIAGNOSIS — Z3A.25 25 WEEKS GESTATION OF PREGNANCY: Primary | ICD-10-CM

## 2025-02-20 DIAGNOSIS — Z34.02 PRENATAL CARE, FIRST PREGNANCY IN SECOND TRIMESTER: ICD-10-CM

## 2025-02-20 DIAGNOSIS — E10.9 TYPE 1 DIABETES MELLITUS ON INSULIN THERAPY (HCC): ICD-10-CM

## 2025-02-20 DIAGNOSIS — Z11.3 SCREEN FOR STD (SEXUALLY TRANSMITTED DISEASE): ICD-10-CM

## 2025-02-20 LAB
SL AMB  POCT GLUCOSE, UA: NORMAL
SL AMB POCT URINE PROTEIN: NORMAL

## 2025-02-20 PROCEDURE — 81002 URINALYSIS NONAUTO W/O SCOPE: CPT | Performed by: OBSTETRICS & GYNECOLOGY

## 2025-02-20 PROCEDURE — PNV: Performed by: OBSTETRICS & GYNECOLOGY

## 2025-02-20 NOTE — PROGRESS NOTES
"Problem List Items Addressed This Visit       Type 1 diabetes mellitus on insulin therapy (HCC)    Following closely with MFM and DP  Has f/u with MFM 3/13 for growth u/s  Sp normal fetal echo  Pt reports her BG is well controlled    Lab Results   Component Value Date    HGBA1C 5.6 2025            25 weeks gestation of pregnancy - Primary    Pt doing well today, no complaints  +FM. Denies ctx, vb and lof.   Third trimester labs ordered today.   Sp normal fetal echo  Sp normal anatomy scan   Precautions reviewed. RTO in 4 weeks          Other Visit Diagnoses         Prenatal care, first pregnancy in second trimester        Relevant Orders    Anemia Panel w/Reflex, OB    CBC and differential    POCT urine dip      Screen for STD (sexually transmitted disease)        Relevant Orders    RPR-Syphilis Screening (Total Syphilis IGG/IGM)            Yu Ariza is a 34 y.o.  at 25w2d who presents today for routine prenatal visit.     /62 (BP Location: Left arm, Patient Position: Sitting, Cuff Size: Large)   Pulse 99   Ht 5' 6\" (1.676 m)   Wt 116 kg (256 lb 3.2 oz)   LMP 2024 (Exact Date)   BMI 41.35 kg/m²         "

## 2025-02-20 NOTE — ASSESSMENT & PLAN NOTE
Pt doing well today, no complaints  +FM. Denies ctx, vb and lof.   Third trimester labs ordered today.   Sp normal fetal echo  Sp normal anatomy scan   Precautions reviewed. RTO in 4 weeks

## 2025-02-20 NOTE — PROGRESS NOTES
Patient presents for a routine prenatal visit    25W2D  Good Fetal Movement  No LOF,bleeding,discharge or cramping.  No current complaints at this time.  28 week labs ordered today.  Declined flu vaccine.

## 2025-02-20 NOTE — ASSESSMENT & PLAN NOTE
Following closely with MFM and DP  Has f/u with MFM 3/13 for growth u/s  Sp normal fetal echo  Pt reports her BG is well controlled    Lab Results   Component Value Date    HGBA1C 5.6 01/17/2025

## 2025-02-27 DIAGNOSIS — E13.10 DIABETIC KETOACIDOSIS WITHOUT COMA ASSOCIATED WITH OTHER SPECIFIED DIABETES MELLITUS (HCC): ICD-10-CM

## 2025-02-27 RX ORDER — INSULIN GLARGINE 100 [IU]/ML
38 INJECTION, SOLUTION SUBCUTANEOUS
Qty: 15 ML | Refills: 0 | Status: SHIPPED | OUTPATIENT
Start: 2025-02-27 | End: 2025-06-03

## 2025-03-06 PROBLEM — Z3A.28 28 WEEKS GESTATION OF PREGNANCY: Status: ACTIVE | Noted: 2025-03-06

## 2025-03-11 ENCOUNTER — APPOINTMENT (OUTPATIENT)
Dept: LAB | Facility: CLINIC | Age: 35
End: 2025-03-11
Payer: COMMERCIAL

## 2025-03-11 DIAGNOSIS — Z11.3 SCREEN FOR STD (SEXUALLY TRANSMITTED DISEASE): ICD-10-CM

## 2025-03-11 DIAGNOSIS — Z34.02 PRENATAL CARE, FIRST PREGNANCY IN SECOND TRIMESTER: ICD-10-CM

## 2025-03-11 LAB
BASOPHILS # BLD AUTO: 0.03 THOUSANDS/ÂΜL (ref 0–0.1)
BASOPHILS NFR BLD AUTO: 0 % (ref 0–1)
EOSINOPHIL # BLD AUTO: 0.08 THOUSAND/ÂΜL (ref 0–0.61)
EOSINOPHIL NFR BLD AUTO: 1 % (ref 0–6)
ERYTHROCYTE [DISTWIDTH] IN BLOOD BY AUTOMATED COUNT: 12.1 % (ref 11.6–15.1)
HCT VFR BLD AUTO: 37.9 % (ref 34.8–46.1)
HGB BLD-MCNC: 13.1 G/DL (ref 11.5–15.4)
IMM GRANULOCYTES # BLD AUTO: 0.08 THOUSAND/UL (ref 0–0.2)
IMM GRANULOCYTES NFR BLD AUTO: 1 % (ref 0–2)
LYMPHOCYTES # BLD AUTO: 1.56 THOUSANDS/ÂΜL (ref 0.6–4.47)
LYMPHOCYTES NFR BLD AUTO: 17 % (ref 14–44)
MCH RBC QN AUTO: 30.4 PG (ref 26.8–34.3)
MCHC RBC AUTO-ENTMCNC: 34.6 G/DL (ref 31.4–37.4)
MCV RBC AUTO: 88 FL (ref 82–98)
MONOCYTES # BLD AUTO: 0.56 THOUSAND/ÂΜL (ref 0.17–1.22)
MONOCYTES NFR BLD AUTO: 6 % (ref 4–12)
NEUTROPHILS # BLD AUTO: 6.74 THOUSANDS/ÂΜL (ref 1.85–7.62)
NEUTS SEG NFR BLD AUTO: 75 % (ref 43–75)
NRBC BLD AUTO-RTO: 0 /100 WBCS
PLATELET # BLD AUTO: 161 THOUSANDS/UL (ref 149–390)
PMV BLD AUTO: 12.2 FL (ref 8.9–12.7)
RBC # BLD AUTO: 4.31 MILLION/UL (ref 3.81–5.12)
WBC # BLD AUTO: 9.05 THOUSAND/UL (ref 4.31–10.16)

## 2025-03-11 PROCEDURE — 36415 COLL VENOUS BLD VENIPUNCTURE: CPT

## 2025-03-11 PROCEDURE — 85025 COMPLETE CBC W/AUTO DIFF WBC: CPT

## 2025-03-11 PROCEDURE — 86780 TREPONEMA PALLIDUM: CPT

## 2025-03-12 LAB — TREPONEMA PALLIDUM IGG+IGM AB [PRESENCE] IN SERUM OR PLASMA BY IMMUNOASSAY: NORMAL

## 2025-03-13 ENCOUNTER — ROUTINE PRENATAL (OUTPATIENT)
Age: 35
End: 2025-03-13
Payer: COMMERCIAL

## 2025-03-13 ENCOUNTER — ULTRASOUND (OUTPATIENT)
Dept: PERINATAL CARE | Facility: OTHER | Age: 35
End: 2025-03-13
Payer: COMMERCIAL

## 2025-03-13 VITALS
DIASTOLIC BLOOD PRESSURE: 52 MMHG | SYSTOLIC BLOOD PRESSURE: 116 MMHG | BODY MASS INDEX: 42.43 KG/M2 | WEIGHT: 264 LBS | HEIGHT: 66 IN | HEART RATE: 103 BPM

## 2025-03-13 VITALS
HEART RATE: 99 BPM | DIASTOLIC BLOOD PRESSURE: 70 MMHG | BODY MASS INDEX: 42.23 KG/M2 | HEIGHT: 66 IN | WEIGHT: 262.8 LBS | SYSTOLIC BLOOD PRESSURE: 94 MMHG

## 2025-03-13 DIAGNOSIS — Z23 NEED FOR TDAP VACCINATION: ICD-10-CM

## 2025-03-13 DIAGNOSIS — Z34.02 PRENATAL CARE, FIRST PREGNANCY IN SECOND TRIMESTER: ICD-10-CM

## 2025-03-13 DIAGNOSIS — O24.019 TYPE 1 DIABETES MELLITUS AFFECTING PREGNANCY, ANTEPARTUM: Primary | ICD-10-CM

## 2025-03-13 DIAGNOSIS — Z3A.28 28 WEEKS GESTATION OF PREGNANCY: Primary | ICD-10-CM

## 2025-03-13 DIAGNOSIS — E10.9 TYPE 1 DIABETES MELLITUS ON INSULIN THERAPY (HCC): ICD-10-CM

## 2025-03-13 DIAGNOSIS — Z3A.28 28 WEEKS GESTATION OF PREGNANCY: ICD-10-CM

## 2025-03-13 LAB
SL AMB  POCT GLUCOSE, UA: NORMAL
SL AMB POCT URINE PROTEIN: NORMAL

## 2025-03-13 PROCEDURE — 76816 OB US FOLLOW-UP PER FETUS: CPT | Performed by: OBSTETRICS & GYNECOLOGY

## 2025-03-13 PROCEDURE — PNV: Performed by: OBSTETRICS & GYNECOLOGY

## 2025-03-13 PROCEDURE — 99213 OFFICE O/P EST LOW 20 MIN: CPT | Performed by: OBSTETRICS & GYNECOLOGY

## 2025-03-13 PROCEDURE — 90471 IMMUNIZATION ADMIN: CPT | Performed by: OBSTETRICS & GYNECOLOGY

## 2025-03-13 PROCEDURE — 90715 TDAP VACCINE 7 YRS/> IM: CPT | Performed by: OBSTETRICS & GYNECOLOGY

## 2025-03-13 PROCEDURE — 81002 URINALYSIS NONAUTO W/O SCOPE: CPT | Performed by: OBSTETRICS & GYNECOLOGY

## 2025-03-13 NOTE — ASSESSMENT & PLAN NOTE
Pt doing well today, no complaints  +FM. Denies ctx, vb and lof  28w labs reviewed, cbc and rpr within normal limits  Tdpa given today  Yellow folder given today   Delivery consents signed today.     The patient was counseled about the labor process.     She was counseled about the possible need for operative delivery using the vacuum or forceps and the indications for doing so. She was counseled that there is a small risk of  complications including intracranial hemorrhage, lacerations, nerve damage or fracture as well as the increased risk for more severe maternal laceration. The patient signed consent.    She was counseled regarding potential reasons that she may need a  section including arrest of dilation/descent, non-reassuring fetal status, or worsening maternal status.      She was counseled on the risks of  including bleeding, infection, and injury to surrounding structures including the bowel and bladder. She was counseled that there are medical and surgical methods to manage excessive postpartum bleeding. She was counseled that in the event of excessive blood loss, she may require blood transfusion which includes a small risk of blood borne diseases such as hepatitis and HIV. The patient is OK with receiving a blood transfusion if necessary.  The patient had an opportunity to ask questions and signed consent.      Precautions reviewed. RTO in 2 weeks

## 2025-03-13 NOTE — PROGRESS NOTES
Patient presents for a routine prenatal visit    28W2D  Good Fetal Movement  No LOF,bleeding,discharge or cramping.  No current complaints at this time.     Yellow folder given to patient and reviewed at today's visit.   Plans on breastfeeding, patient would like to check with program through employer before ordering breast pump through us. Will let OB navigator know during phone call.  28 week labs are completed.    Tdap given in L Deltoid. VIS given. Tolerated well.  Lot #: [id number]  Exp: 2027

## 2025-03-13 NOTE — ASSESSMENT & PLAN NOTE
Following with MFM and DP  BG well controlled  Had growth scan today- EFW 54%ile  Has f/u in 4 weeks  Lab Results   Component Value Date    HGBA1C 5.6 01/17/2025

## 2025-03-13 NOTE — PROGRESS NOTES
Problem List Items Addressed This Visit       Type 1 diabetes mellitus on insulin therapy (HCC)    Following with MFM and DP  BG well controlled  Had growth scan today- EFW 54%ile  Has f/u in 4 weeks  Lab Results   Component Value Date    HGBA1C 5.6 2025            28 weeks gestation of pregnancy - Primary    Pt doing well today, no complaints  +FM. Denies ctx, vb and lof  28w labs reviewed, cbc and rpr within normal limits  Tdpa given today  Yellow folder given today   Delivery consents signed today.     The patient was counseled about the labor process.     She was counseled about the possible need for operative delivery using the vacuum or forceps and the indications for doing so. She was counseled that there is a small risk of  complications including intracranial hemorrhage, lacerations, nerve damage or fracture as well as the increased risk for more severe maternal laceration. The patient signed consent.    She was counseled regarding potential reasons that she may need a  section including arrest of dilation/descent, non-reassuring fetal status, or worsening maternal status.      She was counseled on the risks of  including bleeding, infection, and injury to surrounding structures including the bowel and bladder. She was counseled that there are medical and surgical methods to manage excessive postpartum bleeding. She was counseled that in the event of excessive blood loss, she may require blood transfusion which includes a small risk of blood borne diseases such as hepatitis and HIV. The patient is OK with receiving a blood transfusion if necessary.  The patient had an opportunity to ask questions and signed consent.      Precautions reviewed. RTO in 2 weeks            Other Visit Diagnoses         Prenatal care, first pregnancy in second trimester        Relevant Orders    POCT urine dip (Completed)      Need for Tdap vaccination        Relevant Orders    Tdap Vaccine greater  "than or equal to 8yo (Completed)            Yu Ariza is a 34 y.o.  at 28w2d who presents today for routine prenatal visit.     BP 94/70 (BP Location: Left arm, Patient Position: Sitting, Cuff Size: Large)   Pulse 99   Ht 5' 6\" (1.676 m)   Wt 119 kg (262 lb 12.8 oz)   LMP 2024 (Exact Date)   BMI 42.42 kg/m²       FH 28 cm    "

## 2025-03-13 NOTE — PROGRESS NOTES
Problem List Items Addressed This Visit    None  Visit Diagnoses         Prenatal care, first pregnancy in second trimester    -  Primary    Relevant Orders    POCT urine dip            Yu Ariza is a 34 y.o.  at 28w2d who presents today for routine prenatal visit.     Pulse 99   LMP 2024 (Exact Date)     FHR  FH

## 2025-03-13 NOTE — PROGRESS NOTES
The patient was seen today for an ultrasound.  Please see ultrasound report (located under Ob Procedures) for additional details.   Thank you very much for allowing us to participate in the care of this very nice patient.  Should you have any questions, please do not hesitate to contact me.     Philip Paredse MD FACOG  Attending Physician, Maternal-Fetal Medicine  [facility]

## 2025-03-17 ENCOUNTER — RESULTS FOLLOW-UP (OUTPATIENT)
Dept: LABOR AND DELIVERY | Facility: HOSPITAL | Age: 35
End: 2025-03-17

## 2025-03-18 DIAGNOSIS — O24.019 TYPE 1 DIABETES MELLITUS AFFECTING PREGNANCY, ANTEPARTUM: Primary | ICD-10-CM

## 2025-03-24 ENCOUNTER — TELEPHONE (OUTPATIENT)
Dept: OBGYN CLINIC | Facility: CLINIC | Age: 35
End: 2025-03-24

## 2025-03-24 NOTE — TELEPHONE ENCOUNTER
Called pt, in regard to her questions related to forceps. Vacuum vs . Per communication consent, LEFT MESSAGE ,  Advising pt to address these concerns/questions at her OB visit in two days, 3/26/25, with Dr Luis.   Advised to call with any questions/concerns prior to Wednesday.

## 2025-03-26 ENCOUNTER — RESULTS FOLLOW-UP (OUTPATIENT)
Facility: HOSPITAL | Age: 35
End: 2025-03-26

## 2025-03-26 ENCOUNTER — APPOINTMENT (OUTPATIENT)
Dept: LAB | Facility: CLINIC | Age: 35
End: 2025-03-26
Payer: COMMERCIAL

## 2025-03-26 ENCOUNTER — ROUTINE PRENATAL (OUTPATIENT)
Age: 35
End: 2025-03-26
Payer: COMMERCIAL

## 2025-03-26 VITALS
DIASTOLIC BLOOD PRESSURE: 60 MMHG | BODY MASS INDEX: 43.1 KG/M2 | HEART RATE: 88 BPM | WEIGHT: 268.2 LBS | SYSTOLIC BLOOD PRESSURE: 96 MMHG | HEIGHT: 66 IN

## 2025-03-26 DIAGNOSIS — Z34.03 PRENATAL CARE, FIRST PREGNANCY IN THIRD TRIMESTER: ICD-10-CM

## 2025-03-26 DIAGNOSIS — Z3A.30 30 WEEKS GESTATION OF PREGNANCY: Primary | ICD-10-CM

## 2025-03-26 DIAGNOSIS — E10.9 TYPE 1 DIABETES MELLITUS ON INSULIN THERAPY (HCC): ICD-10-CM

## 2025-03-26 DIAGNOSIS — O24.019 TYPE 1 DIABETES MELLITUS AFFECTING PREGNANCY, ANTEPARTUM: ICD-10-CM

## 2025-03-26 DIAGNOSIS — E66.01 CLASS 3 SEVERE OBESITY DUE TO EXCESS CALORIES WITH SERIOUS COMORBIDITY AND BODY MASS INDEX (BMI) OF 40.0 TO 44.9 IN ADULT (HCC): ICD-10-CM

## 2025-03-26 DIAGNOSIS — E66.813 CLASS 3 SEVERE OBESITY DUE TO EXCESS CALORIES WITH SERIOUS COMORBIDITY AND BODY MASS INDEX (BMI) OF 40.0 TO 44.9 IN ADULT (HCC): ICD-10-CM

## 2025-03-26 LAB
ALBUMIN SERPL BCG-MCNC: 3.5 G/DL (ref 3.5–5)
ALP SERPL-CCNC: 111 U/L (ref 34–104)
ALT SERPL W P-5'-P-CCNC: 56 U/L (ref 7–52)
ANION GAP SERPL CALCULATED.3IONS-SCNC: 7 MMOL/L (ref 4–13)
AST SERPL W P-5'-P-CCNC: 30 U/L (ref 13–39)
BILIRUB SERPL-MCNC: 0.23 MG/DL (ref 0.2–1)
BUN SERPL-MCNC: 12 MG/DL (ref 5–25)
CALCIUM SERPL-MCNC: 8.8 MG/DL (ref 8.4–10.2)
CHLORIDE SERPL-SCNC: 102 MMOL/L (ref 96–108)
CO2 SERPL-SCNC: 25 MMOL/L (ref 21–32)
CREAT SERPL-MCNC: 0.53 MG/DL (ref 0.6–1.3)
CREAT UR-MCNC: 76.3 MG/DL
EST. AVERAGE GLUCOSE BLD GHB EST-MCNC: 126 MG/DL
GFR SERPL CREATININE-BSD FRML MDRD: 126 ML/MIN/1.73SQ M
GLUCOSE P FAST SERPL-MCNC: 92 MG/DL (ref 65–99)
HBA1C MFR BLD: 6 %
POTASSIUM SERPL-SCNC: 4.3 MMOL/L (ref 3.5–5.3)
PROT SERPL-MCNC: 6.8 G/DL (ref 6.4–8.4)
PROT UR-MCNC: 10.9 MG/DL
PROT/CREAT UR: 0.1 MG/G{CREAT} (ref 0–0.1)
SL AMB  POCT GLUCOSE, UA: NORMAL
SL AMB POCT URINE PROTEIN: NORMAL
SODIUM SERPL-SCNC: 134 MMOL/L (ref 135–147)

## 2025-03-26 PROCEDURE — 84156 ASSAY OF PROTEIN URINE: CPT

## 2025-03-26 PROCEDURE — 82570 ASSAY OF URINE CREATININE: CPT

## 2025-03-26 PROCEDURE — PNV: Performed by: OBSTETRICS & GYNECOLOGY

## 2025-03-26 PROCEDURE — 83036 HEMOGLOBIN GLYCOSYLATED A1C: CPT

## 2025-03-26 PROCEDURE — 80053 COMPREHEN METABOLIC PANEL: CPT

## 2025-03-26 PROCEDURE — 81002 URINALYSIS NONAUTO W/O SCOPE: CPT | Performed by: OBSTETRICS & GYNECOLOGY

## 2025-03-26 PROCEDURE — 36415 COLL VENOUS BLD VENIPUNCTURE: CPT

## 2025-03-26 NOTE — PROGRESS NOTES
Patient presents for a routine prenatal visit    30W1D  Good Fetal Movement  No LOF,bleeding,discharge or cramping.  No current complaints at this time.  Delivery consent is signed, would like to go over it again.  UTD on Tdap vaccine.

## 2025-03-26 NOTE — ASSESSMENT & PLAN NOTE
Pt doing well today, no complaints  +FM. Denies ctx, vb and lof.   Delivery consents reviewed again today at pt request. She prefers to avoid operative vaginal delivery and would elect for  in the event that this might be necessary. She declines students. Reviewed resident physician role in care on L&D and pt is aware that they will be active participants in her care with attending physician closely overseeing and attending present for delivery.   Birth plan reviewed today   Sp tdap last visit  Has MFM follow up scheduled  Precautions reviewed. RTO in 2 weeks

## 2025-03-26 NOTE — PROGRESS NOTES
"Problem List Items Addressed This Visit       Type 1 diabetes mellitus on insulin therapy (HCC)    BG well controlled per pt report  Last growth scan on 3/13 EFW 54%ile  Has  surveillance and serial growth scheduled  Lab Results   Component Value Date    HGBA1C 5.6 2025            Class 3 severe obesity due to excess calories with serious comorbidity and body mass index (BMI) of 40.0 to 44.9 in adult (HCC)    TWG 29 lbs         30 weeks gestation of pregnancy - Primary    Pt doing well today, no complaints  +FM. Denies ctx, vb and lof.   Delivery consents reviewed again today at pt request. She prefers to avoid operative vaginal delivery and would elect for  in the event that this might be necessary. She declines students. Reviewed resident physician role in care on L&D and pt is aware that they will be active participants in her care with attending physician closely overseeing and attending present for delivery.   Birth plan reviewed today   Sp tdap last visit  Has MFM follow up scheduled  Precautions reviewed. RTO in 2 weeks                Other Visit Diagnoses         Prenatal care, first pregnancy in third trimester        Relevant Orders    POCT urine dip            Yu Ariza is a 34 y.o.  at 30w1d who presents today for routine prenatal visit.     BP 96/60 (BP Location: Left arm, Patient Position: Sitting, Cuff Size: Large)   Pulse 88   Ht 5' 6\" (1.676 m)   Wt 122 kg (268 lb 3.2 oz)   LMP 2024 (Exact Date)   BMI 43.29 kg/m²       FH 30 cm    "

## 2025-03-26 NOTE — ASSESSMENT & PLAN NOTE
BG well controlled per pt report  Last growth scan on 3/13 EFW 54%ile  Has  surveillance and serial growth scheduled  Lab Results   Component Value Date    HGBA1C 5.6 2025

## 2025-04-01 ENCOUNTER — TELEPHONE (OUTPATIENT)
Dept: OBGYN CLINIC | Facility: CLINIC | Age: 35
End: 2025-04-01

## 2025-04-01 NOTE — TELEPHONE ENCOUNTER
----- Message from Neom DILLON sent at 11/12/2024  9:08 AM EST -----  Regarding: 3rd Trimester Call Due  3/25-4/22

## 2025-04-03 ENCOUNTER — TELEPHONE (OUTPATIENT)
Dept: PERINATAL CARE | Facility: CLINIC | Age: 35
End: 2025-04-03

## 2025-04-03 NOTE — TELEPHONE ENCOUNTER
Placed call to patient to confirm current Lantus dose so her requested refill can be ordered. Left detailed voice message and number to call back.

## 2025-04-07 PROBLEM — Z3A.32 32 WEEKS GESTATION OF PREGNANCY: Status: ACTIVE | Noted: 2025-03-06

## 2025-04-07 NOTE — PROGRESS NOTES
Pt is here for routine ob visit   No concerns at this time  Urine  No LOF,VB,Contractions  +FM   UTD tdap   Delivery consent signed at previous visit

## 2025-04-07 NOTE — ASSESSMENT & PLAN NOTE
-precautions reviewed  -s/p Tdap  -birth plan returned previously  -prepregnancy BMI 38 with goal weight gain 11-20#: TWG = 26#, recommended walking daily

## 2025-04-08 ENCOUNTER — ROUTINE PRENATAL (OUTPATIENT)
Age: 35
End: 2025-04-08
Payer: COMMERCIAL

## 2025-04-08 VITALS
HEIGHT: 66 IN | SYSTOLIC BLOOD PRESSURE: 110 MMHG | WEIGHT: 265 LBS | DIASTOLIC BLOOD PRESSURE: 70 MMHG | BODY MASS INDEX: 42.59 KG/M2

## 2025-04-08 DIAGNOSIS — O24.019 TYPE 1 DIABETES MELLITUS AFFECTING PREGNANCY, ANTEPARTUM: ICD-10-CM

## 2025-04-08 DIAGNOSIS — Z3A.32 32 WEEKS GESTATION OF PREGNANCY: Primary | ICD-10-CM

## 2025-04-08 DIAGNOSIS — Z34.03 PRENATAL CARE, FIRST PREGNANCY IN THIRD TRIMESTER: ICD-10-CM

## 2025-04-08 PROBLEM — Z3A.33 33 WEEKS GESTATION OF PREGNANCY: Status: ACTIVE | Noted: 2025-04-08

## 2025-04-08 LAB
SL AMB  POCT GLUCOSE, UA: ABNORMAL
SL AMB POCT URINE PROTEIN: ABNORMAL

## 2025-04-08 PROCEDURE — PNV: Performed by: STUDENT IN AN ORGANIZED HEALTH CARE EDUCATION/TRAINING PROGRAM

## 2025-04-08 PROCEDURE — 81002 URINALYSIS NONAUTO W/O SCOPE: CPT | Performed by: STUDENT IN AN ORGANIZED HEALTH CARE EDUCATION/TRAINING PROGRAM

## 2025-04-08 NOTE — PROGRESS NOTES
34 y.o.  at 32w0d, here for routine OB visit. Feeling well overall and without concerns. Good FM. Denies LOF, VB, contractions. Denies dysuria, hematuria. No problems with activity. Active daily.     Problem List Items Addressed This Visit          Endocrine    Type 1 diabetes mellitus affecting pregnancy, antepartum    Notes good BG control. Reviewed delivery timing recommendations, generally, that much would depend on BG and monitoring    Lab Results   Component Value Date    HGBA1C 6.0 (H) 2025               Obstetrics/Gynecology    32 weeks gestation of pregnancy - Primary    -precautions reviewed  -s/p Tdap  -birth plan returned previously  -prepregnancy BMI 38 with goal weight gain 11-20#: TWG = 26#, recommended walking daily          Other Visit Diagnoses         Prenatal care, first pregnancy in third trimester        Relevant Orders    POCT urine dip (Completed)

## 2025-04-08 NOTE — ASSESSMENT & PLAN NOTE
Notes good BG control. Reviewed delivery timing recommendations, generally, that much would depend on BG and monitoring    Lab Results   Component Value Date    HGBA1C 6.0 (H) 03/26/2025

## 2025-04-08 NOTE — PROGRESS NOTES
Pt here for routine ob visit.   No concerns.  No LOF, VB, Contractions.  +FM  UTD Tdap.  Delivery consent signed at previous visit.  Urine trace protein / neg

## 2025-04-10 NOTE — PATIENT INSTRUCTIONS
"Patient Education     Your baby's movement before birth   The Basics   Written by the doctors and editors at [facility]   When should I start feeling my baby move? -- It depends. Most people first feel their baby moving in the uterus between about 16 and 20 weeks of pregnancy. It might take longer to feel movement if this is your first pregnancy or if the placenta is in the front of your uterus.  What kinds of movements should I feel? -- When you first feel your baby move, it might feel like a gentle flutter in your belly. This is sometimes called \"quickening.\" As the baby grows, their movements will get stronger. You will probably feel them kicking, rolling, and stretching. Later in pregnancy, you might be able to see and feel the baby moving from the outside.  You might notice that your baby is more active at certain times of the day or night. Even before birth, babies have periods of being asleep and awake. When your baby is sleeping, you might notice that they do not move as much.  Should I keep track of my baby's movements? -- If your pregnancy is healthy, you probably do not need to count or record your baby's movements. Feeling regular movement is a good sign that the baby is doing well.  In some cases, your doctor or midwife might ask you to keep track of your baby's movements. If so, they will tell you how to do this and when to call them.  A change in your baby's movements does not always mean that there is a problem. But in some cases, it can be a sign that the baby is having trouble. If your doctor or midwife is concerned, they can do tests to check on the baby.  If I am asked to track movement, how should I do it? -- There are different ways of tracking your baby's movement. This is sometimes called \"kick counting.\"  Your doctor or midwife will tell you exactly what to track. For example, they might ask you to write down:   How long it takes to feel 10 kicks or movements   How many times your baby moves " in 1 hour  Many experts consider at least 10 movements in 2 hours to be a sign that the baby is doing well. But there is no specific cutoff for exactly how much movement is healthy or unhealthy. Some babies are more active than others, and some pregnant people feel movement more easily than others. The main goal of kick counting is to get to know your baby's normal patterns so you can tell if anything changes.  If you are doing kick counting:   Choose a time of day when your baby is usually active.   Find a quiet place where you will not be distracted.   Lie down on your side in a comfortable position.   Check the clock, or set a timer.   Each time you feel your baby move or kick, write down the time. Some people use a smartphone river to keep track.   If your baby seems less active than usual, try moving around, eating a snack, and emptying your bladder. This can help wake the baby up if they are asleep.   Stop counting after you have felt 10 kicks, or after the length of time your doctor or midwife told you.  When should I call the doctor? -- Call your doctor or midwife for advice if:   You have concerns about your baby's movement.   Your baby is moving less than they normally do.   You notice a sudden change in the pattern of your baby's movements.   You have any other symptoms that worry you.  All topics are updated as new evidence becomes available and our peer review process is complete.  This topic retrieved from [employer] on: Feb 26, 2024.  Topic 119898 Version 1.0  Release: 32.2.4 - C32.56  © 2024 UpToDate, Inc. and/or its affiliates. All rights reserved.  Consumer Information Use and Disclaimer   Disclaimer: This generalized information is a limited summary of diagnosis, treatment, and/or medication information. It is not meant to be comprehensive and should be used as a tool to help the user understand and/or assess potential diagnostic and treatment options. It does NOT include all information about  conditions, treatments, medications, side effects, or risks that may apply to a specific patient. It is not intended to be medical advice or a substitute for the medical advice, diagnosis, or treatment of a health care provider based on the health care provider's examination and assessment of a patient's specific and unique circumstances. Patients must speak with a health care provider for complete information about their health, medical questions, and treatment options, including any risks or benefits regarding use of medications. This information does not endorse any treatments or medications as safe, effective, or approved for treating a specific patient. UpToDate, Inc. and its affiliates disclaim any warranty or liability relating to this information or the use thereof.The use of this information is governed by the Terms of Use, available at https://www.[employer].com/en/know/clinical-effectiveness-terms. © UpToDate, Inc. and its affiliates and/or licensors. All rights reserved.  Copyright   ©  UpToDate, Inc. and/or its affiliates. All rights reserved.  Thank you for choosing us for your  care today.  If you have any questions about your ultrasound or care, please do not hesitate to contact us or your primary obstetrician.        Some general instructions for your pregnancy are:    Exercise: Aim for 150 minutes per week of regular exercise.  Walking is great!  Nutrition: Choose healthy sources of calcium, iron, and protein.  Avoid ultraprocessed foods and added sugar.  Learn about Preeclampsia: preeclampsia is a common, potentially serious high blood pressure complication in pregnancy.  A blood pressure of 140mmHg (systolic or top number) or 90mmHg (diastolic or bottom number) should be evaluated by your doctor.  Aspirin is sometimes prescribed in early pregnancy to prevent preeclampsia in women with risk factors - ask your obstetrician if you should be on this medication.  For more  resources, visit:  https://www.highriskpregnancyinfo.org/preeclampsia  If you smoke, please try to quit completely but also try to reduce your smoking by as much as possible (as soon as possible).  Do not vape.  Please also avoid cannabis products.  Other warning signs to watch out for in pregnancy or postpartum: chest pain, obstructed breathing or shortness of breath, seizures, thoughts of hurting yourself or your baby, bleeding, a painful or swollen leg, fever, or headache (see [facility] POST-BIRTH Warning Signs campaign).  If these happen call 911.  Itching is also not normal in pregnancy and if you experience this, especially over your hands and feet, potentially worse at night, notify your doctors.     Nonstress Test for Pregnancy   WHAT YOU NEED TO KNOW:   What do I need to know about a nonstress test?  A nonstress test measures your baby's heart rate and movements. Nonstress means that no stress will be placed on your baby during the test.  How do I prepare for a nonstress test?  Your healthcare provider will talk to you about how to prepare for this test. Your provider may tell you to eat and drink plenty of liquids before your test. If you smoke, you may be asked not to smoke within 2 hours before the test. Your provider will also tell you which medicines to take or not take on the day of your test.  What will happen during a nonstress test?  You may be asked to lie down or recline back for the test on a bed. One or 2 belts with sensors will be placed around your abdomen. Your baby's heart rate will be recorded with a machine. If your baby does not move, your baby may be asleep. Your healthcare provider may make a noise near your abdomen to try to wake your baby. The test usually takes about 20 minutes, but can take longer if your baby needs to be awakened.        What do I need to know about the test results?  Your baby will be expected to move at least 2 times for a certain amount of time. Your baby's heart  rate will be expected to go up by a certain number of beats per minute during movement. If your baby does not move as expected, the test may need to be repeated or you may need other tests.  CARE AGREEMENT:   You have the right to help plan your care. Learn about your health condition and how it may be treated. Discuss treatment options with your healthcare providers to decide what care you want to receive. You always have the right to refuse treatment. The above information is an  only. It is not intended as medical advice for individual conditions or treatments. Talk to your doctor, nurse or pharmacist before following any medical regimen to see if it is safe and effective for you.  © Copyright [employer] 2023 Information is for End User's use only and may not be sold, redistributed or otherwise used for commercial purposes.     Kick Counts in Pregnancy   AMBULATORY CARE:   Kick counts  measure how much your baby is moving in your womb. A kick from your baby can be felt as a twist, turn, swish, roll, or jab. It is common to feel your baby kicking at 26 to 28 weeks of pregnancy. You may feel your baby kick as early as 20 weeks of pregnancy. You may want to start counting at 28 weeks.   Contact your doctor immediately if:   You feel a change in the number of kicks or movements of your baby.      You feel fewer than 10 kicks within 2 hours.      You have questions or concerns about your baby's movements.     Why measure kick counts:  Your baby's movement may provide information about your baby's health. He or she may move less, or not at all, if there are problems. Your baby may move less if he or she is not getting enough oxygen or nutrition from the placenta. Do not smoke while you are pregnant. Smoking decreases the amount of oxygen that gets to your baby. Talk to your healthcare provider if you need help to quit smoking. Tell your healthcare provider as soon as you feel a change in your baby's  movements.  When to measure kick counts:   Measure kick counts at the same time every day.       Measure kick counts when your baby is awake and most active. Your baby may be most active in the evening.     How to measure kick counts:  Check that your baby is awake before you measure kick counts. You can wake up your baby by lightly pushing on your belly, walking, or drinking something cold. Your healthcare provider may tell you different ways to measure kick counts. You may be told to do the following:  Use a chart or clock to keep track of the time you start and finish counting.      Sit in a chair or lie on your left side.      Place your hands on the largest part of your belly.      Count until you reach 10 kicks. Write down how much time it takes to count 10 kicks.      It may take 30 minutes to 2 hours to count 10 kicks. It should not take more than 2 hours to count 10 kicks.     Follow up with your doctor as directed:  Write down your questions so you remember to ask them during your visits.   © Copyright [employer] 2023 Information is for End User's use only and may not be sold, redistributed or otherwise used for commercial purposes.  The above information is an  only. It is not intended as medical advice for individual conditions or treatments. Talk to your doctor, nurse or pharmacist before following any medical regimen to see if it is safe and effective for you.

## 2025-04-15 ENCOUNTER — ULTRASOUND (OUTPATIENT)
Dept: PERINATAL CARE | Facility: OTHER | Age: 35
End: 2025-04-15
Payer: COMMERCIAL

## 2025-04-15 VITALS
DIASTOLIC BLOOD PRESSURE: 69 MMHG | HEIGHT: 66 IN | SYSTOLIC BLOOD PRESSURE: 117 MMHG | WEIGHT: 268.2 LBS | HEART RATE: 80 BPM | BODY MASS INDEX: 43.1 KG/M2

## 2025-04-15 DIAGNOSIS — Z3A.33 33 WEEKS GESTATION OF PREGNANCY: Primary | ICD-10-CM

## 2025-04-15 DIAGNOSIS — D25.9 UTERINE FIBROID IN PREGNANCY: ICD-10-CM

## 2025-04-15 DIAGNOSIS — O99.213 OBESITY AFFECTING PREGNANCY IN THIRD TRIMESTER, UNSPECIFIED OBESITY TYPE: ICD-10-CM

## 2025-04-15 DIAGNOSIS — O34.10 UTERINE FIBROID IN PREGNANCY: ICD-10-CM

## 2025-04-15 DIAGNOSIS — O24.019 TYPE 1 DIABETES MELLITUS AFFECTING PREGNANCY, ANTEPARTUM: ICD-10-CM

## 2025-04-15 DIAGNOSIS — Z36.89 ENCOUNTER FOR ULTRASOUND TO ASSESS FETAL GROWTH: ICD-10-CM

## 2025-04-15 PROBLEM — R00.0 SINUS TACHYCARDIA: Status: RESOLVED | Noted: 2024-02-27 | Resolved: 2025-04-15

## 2025-04-15 PROCEDURE — 59025 FETAL NON-STRESS TEST: CPT

## 2025-04-15 PROCEDURE — 99213 OFFICE O/P EST LOW 20 MIN: CPT

## 2025-04-15 PROCEDURE — 76816 OB US FOLLOW-UP PER FETUS: CPT | Performed by: OBSTETRICS & GYNECOLOGY

## 2025-04-15 NOTE — LETTER
"   Date: 4/15/2025    YEE Proctor  [address] [facility]  [place] [address]    Patient: Yu Ariza   YOB: 1990   Date of Visit: 4/15/2025   Gestational age 33w1d   Nature of this communication: Routine though please note Normal fetal growth today, well controlled type 1 diabetes. Delivery tentatively recommended at 39 weeks if remains well-controlled.        This patient was seen recently in our  office.  Please see ultrasound report under \"OB Procedures\" tab.  Please don't hesitate to contact our office with any concerns or questions.      Sincerely,      Kristina Oscar MD  Attending Physician, Maternal-Fetal Medicine  [facility]    "

## 2025-04-15 NOTE — PROGRESS NOTES
NST procedure and expected outcome explained to patient.  Daily fetal kick count discussed with handout given.  Patient verbalized understanding of all and was receptive.    Jose Juan Mancera RN

## 2025-04-15 NOTE — PROGRESS NOTES
[facility]: Ms. Ariza was seen today at 33w0d gestational age for NST (found under the pregnancy episode) which I reviewed the RN assessment and agree. NST shows reactivity without decelerations.     Antonia FRAIRE

## 2025-04-17 ENCOUNTER — ROUTINE PRENATAL (OUTPATIENT)
Dept: PERINATAL CARE | Facility: OTHER | Age: 35
End: 2025-04-17
Payer: COMMERCIAL

## 2025-04-17 VITALS
OXYGEN SATURATION: 99 % | DIASTOLIC BLOOD PRESSURE: 50 MMHG | WEIGHT: 268.2 LBS | HEIGHT: 66 IN | BODY MASS INDEX: 43.1 KG/M2 | HEART RATE: 90 BPM | SYSTOLIC BLOOD PRESSURE: 90 MMHG

## 2025-04-17 DIAGNOSIS — Z3A.33 33 WEEKS GESTATION OF PREGNANCY: ICD-10-CM

## 2025-04-17 DIAGNOSIS — O24.019 TYPE 1 DIABETES MELLITUS AFFECTING PREGNANCY, ANTEPARTUM: Primary | ICD-10-CM

## 2025-04-17 PROBLEM — Z3A.34 34 WEEKS GESTATION OF PREGNANCY: Status: ACTIVE | Noted: 2025-04-17

## 2025-04-17 PROCEDURE — 59025 FETAL NON-STRESS TEST: CPT

## 2025-04-17 NOTE — PROGRESS NOTES
[facility]: Ms. Ariza was seen today at 33w2d gestational age for NST (found under the pregnancy episode) which I reviewed the RN assessment and agree.     Yu presents for her twice weekly APFS due to Type 1 diabetes and class II obesity.    Blood sugars were reviewed prior to her visit today and remain well-controlled. She will continue her twice weekly testing with a follow-up growth ultrasound on 5/15 at 37w2d.  Antonia FRAIRE

## 2025-04-17 NOTE — PATIENT INSTRUCTIONS
Thank you for choosing us for your  care today.  If you have any questions about your ultrasound or care, please do not hesitate to contact us or your primary obstetrician.        Some general instructions for your pregnancy are:    Exercise: Aim for 150 minutes per week of regular exercise.  Walking is great!  Nutrition: Choose healthy sources of calcium, iron, and protein.  Avoid ultraprocessed foods and added sugar.  Learn about Preeclampsia: preeclampsia is a common, potentially serious high blood pressure complication in pregnancy.  A blood pressure of 140mmHg (systolic or top number) or 90mmHg (diastolic or bottom number) should be evaluated by your doctor.  Aspirin is sometimes prescribed in early pregnancy to prevent preeclampsia in women with risk factors - ask your obstetrician if you should be on this medication.  For more resources, visit:  https://www.highriskpregnancyinfo.org/preeclampsia  If you smoke, please try to quit completely but also try to reduce your smoking by as much as possible (as soon as possible).  Do not vape.  Please also avoid cannabis products.  Other warning signs to watch out for in pregnancy or postpartum: chest pain, obstructed breathing or shortness of breath, seizures, thoughts of hurting yourself or your baby, bleeding, a painful or swollen leg, fever, or headache (see [facility] POST-BIRTH Warning Signs campaign).  If these happen call 911.  Itching is also not normal in pregnancy and if you experience this, especially over your hands and feet, potentially worse at night, notify your doctors.     Nonstress Test for Pregnancy   WHAT YOU NEED TO KNOW:   What do I need to know about a nonstress test?  A nonstress test measures your baby's heart rate and movements. Nonstress means that no stress will be placed on your baby during the test.  How do I prepare for a nonstress test?  Your healthcare provider will talk to you about how to prepare for this test. Your provider may  tell you to eat and drink plenty of liquids before your test. If you smoke, you may be asked not to smoke within 2 hours before the test. Your provider will also tell you which medicines to take or not take on the day of your test.  What will happen during a nonstress test?  You may be asked to lie down or recline back for the test on a bed. One or 2 belts with sensors will be placed around your abdomen. Your baby's heart rate will be recorded with a machine. If your baby does not move, your baby may be asleep. Your healthcare provider may make a noise near your abdomen to try to wake your baby. The test usually takes about 20 minutes, but can take longer if your baby needs to be awakened.        What do I need to know about the test results?  Your baby will be expected to move at least 2 times for a certain amount of time. Your baby's heart rate will be expected to go up by a certain number of beats per minute during movement. If your baby does not move as expected, the test may need to be repeated or you may need other tests.  CARE AGREEMENT:   You have the right to help plan your care. Learn about your health condition and how it may be treated. Discuss treatment options with your healthcare providers to decide what care you want to receive. You always have the right to refuse treatment. The above information is an  only. It is not intended as medical advice for individual conditions or treatments. Talk to your doctor, nurse or pharmacist before following any medical regimen to see if it is safe and effective for you.  © Copyright [employer] 2023 Information is for End User's use only and may not be sold, redistributed or otherwise used for commercial purposes.     Kick Counts in Pregnancy   AMBULATORY CARE:   Kick counts  measure how much your baby is moving in your womb. A kick from your baby can be felt as a twist, turn, swish, roll, or jab. It is common to feel your baby kicking at 26 to 28 weeks  of pregnancy. You may feel your baby kick as early as 20 weeks of pregnancy. You may want to start counting at 28 weeks.   Contact your doctor immediately if:   You feel a change in the number of kicks or movements of your baby.      You feel fewer than 10 kicks within 2 hours.      You have questions or concerns about your baby's movements.     Why measure kick counts:  Your baby's movement may provide information about your baby's health. He or she may move less, or not at all, if there are problems. Your baby may move less if he or she is not getting enough oxygen or nutrition from the placenta. Do not smoke while you are pregnant. Smoking decreases the amount of oxygen that gets to your baby. Talk to your healthcare provider if you need help to quit smoking. Tell your healthcare provider as soon as you feel a change in your baby's movements.  When to measure kick counts:   Measure kick counts at the same time every day.       Measure kick counts when your baby is awake and most active. Your baby may be most active in the evening.     How to measure kick counts:  Check that your baby is awake before you measure kick counts. You can wake up your baby by lightly pushing on your belly, walking, or drinking something cold. Your healthcare provider may tell you different ways to measure kick counts. You may be told to do the following:  Use a chart or clock to keep track of the time you start and finish counting.      Sit in a chair or lie on your left side.      Place your hands on the largest part of your belly.      Count until you reach 10 kicks. Write down how much time it takes to count 10 kicks.      It may take 30 minutes to 2 hours to count 10 kicks. It should not take more than 2 hours to count 10 kicks.     Follow up with your doctor as directed:  Write down your questions so you remember to ask them during your visits.   © Copyright [employer] 2023 Information is for End User's use only and may not be sold,  redistributed or otherwise used for commercial purposes.  The above information is an  only. It is not intended as medical advice for individual conditions or treatments. Talk to your doctor, nurse or pharmacist before following any medical regimen to see if it is safe and effective for you.

## 2025-04-22 ENCOUNTER — ROUTINE PRENATAL (OUTPATIENT)
Dept: PERINATAL CARE | Facility: OTHER | Age: 35
End: 2025-04-22
Payer: COMMERCIAL

## 2025-04-22 VITALS
DIASTOLIC BLOOD PRESSURE: 60 MMHG | HEART RATE: 80 BPM | BODY MASS INDEX: 43.49 KG/M2 | SYSTOLIC BLOOD PRESSURE: 106 MMHG | HEIGHT: 66 IN | WEIGHT: 270.6 LBS

## 2025-04-22 DIAGNOSIS — O24.019 TYPE 1 DIABETES MELLITUS AFFECTING PREGNANCY, ANTEPARTUM: ICD-10-CM

## 2025-04-22 DIAGNOSIS — Z3A.34 34 WEEKS GESTATION OF PREGNANCY: Primary | ICD-10-CM

## 2025-04-22 DIAGNOSIS — O99.213 OBESITY AFFECTING PREGNANCY IN THIRD TRIMESTER, UNSPECIFIED OBESITY TYPE: ICD-10-CM

## 2025-04-22 PROCEDURE — 59025 FETAL NON-STRESS TEST: CPT

## 2025-04-22 NOTE — PROGRESS NOTES
Her reason for testing was type 1 pregest DM. It was put in error as A2GDM on 4/15  and 4/17, 2025 NST.

## 2025-04-22 NOTE — PATIENT INSTRUCTIONS
Kick Counts in Pregnancy   AMBULATORY CARE:   Kick counts  measure how much your baby is moving in your womb. A kick from your baby can be felt as a twist, turn, swish, roll, or jab. It is common to feel your baby kicking at 26 to 28 weeks of pregnancy. You may feel your baby kick as early as 20 weeks of pregnancy. You may want to start counting at 28 weeks.   Contact your doctor immediately if:   You feel a change in the number of kicks or movements of your baby.      You feel fewer than 10 kicks within 2 hours.      You have questions or concerns about your baby's movements.     Why measure kick counts:  Your baby's movement may provide information about your baby's health. He or she may move less, or not at all, if there are problems. Your baby may move less if he or she is not getting enough oxygen or nutrition from the placenta. Do not smoke while you are pregnant. Smoking decreases the amount of oxygen that gets to your baby. Talk to your healthcare provider if you need help to quit smoking. Tell your healthcare provider as soon as you feel a change in your baby's movements.  When to measure kick counts:   Measure kick counts at the same time every day.       Measure kick counts when your baby is awake and most active. Your baby may be most active in the evening.     How to measure kick counts:  Check that your baby is awake before you measure kick counts. You can wake up your baby by lightly pushing on your belly, walking, or drinking something cold. Your healthcare provider may tell you different ways to measure kick counts. You may be told to do the following:  Use a chart or clock to keep track of the time you start and finish counting.      Sit in a chair or lie on your left side.      Place your hands on the largest part of your belly.      Count until you reach 10 kicks. Write down how much time it takes to count 10 kicks.      It may take 30 minutes to 2 hours to count 10 kicks. It should not take more  than 2 hours to count 10 kicks.     Follow up with your doctor as directed:  Write down your questions so you remember to ask them during your visits.   © Copyright [employer] 2023 Information is for End User's use only and may not be sold, redistributed or otherwise used for commercial purposes.  The above information is an  only. It is not intended as medical advice for individual conditions or treatments. Talk to your doctor, nurse or pharmacist before following any medical regimen to see if it is safe and effective for you. Nonstress Test for Pregnancy   WHAT YOU NEED TO KNOW:   What do I need to know about a nonstress test?  A nonstress test measures your baby's heart rate and movements. Nonstress means that no stress will be placed on your baby during the test.  How do I prepare for a nonstress test?  Your healthcare provider will talk to you about how to prepare for this test. Your provider may tell you to eat and drink plenty of liquids before your test. If you smoke, you may be asked not to smoke within 2 hours before the test. Your provider will also tell you which medicines to take or not take on the day of your test.  What will happen during a nonstress test?  You may be asked to lie down or recline back for the test on a bed. One or 2 belts with sensors will be placed around your abdomen. Your baby's heart rate will be recorded with a machine. If your baby does not move, your baby may be asleep. Your healthcare provider may make a noise near your abdomen to try to wake your baby. The test usually takes about 20 minutes, but can take longer if your baby needs to be awakened.        What do I need to know about the test results?  Your baby will be expected to move at least 2 times for a certain amount of time. Your baby's heart rate will be expected to go up by a certain number of beats per minute during movement. If your baby does not move as expected, the test may need to be repeated or you  may need other tests.  CARE AGREEMENT:   You have the right to help plan your care. Learn about your health condition and how it may be treated. Discuss treatment options with your healthcare providers to decide what care you want to receive. You always have the right to refuse treatment. The above information is an  only. It is not intended as medical advice for individual conditions or treatments. Talk to your doctor, nurse or pharmacist before following any medical regimen to see if it is safe and effective for you.  © 2023 Information is for End User's use only and may not be sold, redistributed or otherwise used for commercial purposes. Thank you for choosing us for your  care today.  If you have any questions about your ultrasound or care, please do not hesitate to contact us or your primary obstetrician.        Some general instructions for your pregnancy are:    Exercise: Aim for 150 minutes per week of regular exercise.  Walking is great!  Nutrition: Choose healthy sources of calcium, iron, and protein.  Avoid ultraprocessed foods and added sugar.  Learn about Preeclampsia: preeclampsia is a common, potentially serious high blood pressure complication in pregnancy.  A blood pressure of 140mmHg (systolic or top number) or 90mmHg (diastolic or bottom number) should be evaluated by your doctor.  Aspirin is sometimes prescribed in early pregnancy to prevent preeclampsia in women with risk factors - ask your obstetrician if you should be on this medication.  For more resources, visit:  https://www.highriskpregnancyinfo.org/preeclampsia  If you smoke, please try to quit completely but also try to reduce your smoking by as much as possible (as soon as possible).  Do not vape.  Please also avoid cannabis products.  Other warning signs to watch out for in pregnancy or postpartum: chest pain, obstructed breathing or shortness of breath, seizures, thoughts of hurting yourself  or your baby, bleeding, a painful or swollen leg, fever, or headache (see [facility] POST-BIRTH Warning Signs campaign).  If these happen call 911.  Itching is also not normal in pregnancy and if you experience this, especially over your hands and feet, potentially worse at night, notify your doctors.     Nonstress Test for Pregnancy   WHAT YOU NEED TO KNOW:   What do I need to know about a nonstress test?  A nonstress test measures your baby's heart rate and movements. Nonstress means that no stress will be placed on your baby during the test.  How do I prepare for a nonstress test?  Your healthcare provider will talk to you about how to prepare for this test. Your provider may tell you to eat and drink plenty of liquids before your test. If you smoke, you may be asked not to smoke within 2 hours before the test. Your provider will also tell you which medicines to take or not take on the day of your test.  What will happen during a nonstress test?  You may be asked to lie down or recline back for the test on a bed. One or 2 belts with sensors will be placed around your abdomen. Your baby's heart rate will be recorded with a machine. If your baby does not move, your baby may be asleep. Your healthcare provider may make a noise near your abdomen to try to wake your baby. The test usually takes about 20 minutes, but can take longer if your baby needs to be awakened.        What do I need to know about the test results?  Your baby will be expected to move at least 2 times for a certain amount of time. Your baby's heart rate will be expected to go up by a certain number of beats per minute during movement. If your baby does not move as expected, the test may need to be repeated or you may need other tests.  CARE AGREEMENT:   You have the right to help plan your care. Learn about your health condition and how it may be treated. Discuss treatment options with your healthcare providers to decide what care you want to receive.  You always have the right to refuse treatment. The above information is an  only. It is not intended as medical advice for individual conditions or treatments. Talk to your doctor, nurse or pharmacist before following any medical regimen to see if it is safe and effective for you.  © Copyright [employer] 2023 Information is for End User's use only and may not be sold, redistributed or otherwise used for commercial purposes.     Kick Counts in Pregnancy   AMBULATORY CARE:   Kick counts  measure how much your baby is moving in your womb. A kick from your baby can be felt as a twist, turn, swish, roll, or jab. It is common to feel your baby kicking at 26 to 28 weeks of pregnancy. You may feel your baby kick as early as 20 weeks of pregnancy. You may want to start counting at 28 weeks.   Contact your doctor immediately if:   You feel a change in the number of kicks or movements of your baby.      You feel fewer than 10 kicks within 2 hours.      You have questions or concerns about your baby's movements.     Why measure kick counts:  Your baby's movement may provide information about your baby's health. He or she may move less, or not at all, if there are problems. Your baby may move less if he or she is not getting enough oxygen or nutrition from the placenta. Do not smoke while you are pregnant. Smoking decreases the amount of oxygen that gets to your baby. Talk to your healthcare provider if you need help to quit smoking. Tell your healthcare provider as soon as you feel a change in your baby's movements.  When to measure kick counts:   Measure kick counts at the same time every day.       Measure kick counts when your baby is awake and most active. Your baby may be most active in the evening.     How to measure kick counts:  Check that your baby is awake before you measure kick counts. You can wake up your baby by lightly pushing on your belly, walking, or drinking something cold. Your healthcare  provider may tell you different ways to measure kick counts. You may be told to do the following:  Use a chart or clock to keep track of the time you start and finish counting.      Sit in a chair or lie on your left side.      Place your hands on the largest part of your belly.      Count until you reach 10 kicks. Write down how much time it takes to count 10 kicks.      It may take 30 minutes to 2 hours to count 10 kicks. It should not take more than 2 hours to count 10 kicks.     Follow up with your doctor as directed:  Write down your questions so you remember to ask them during your visits.   © Copyright [employer] 2023 Information is for End User's use only and may not be sold, redistributed or otherwise used for commercial purposes.  The above information is an  only. It is not intended as medical advice for individual conditions or treatments. Talk to your doctor, nurse or pharmacist before following any medical regimen to see if it is safe and effective for you.

## 2025-04-22 NOTE — PROGRESS NOTES
[facility]: Ms. Ariza was seen today at 34w0d gestational age for NST (found under the pregnancy episode) which I reviewed the RN assessment and agree.     Yu presents today for twice weekly antepartum fetal surveillance due to to pre-gestational type 1 diabetes and class II obesity. Her nonstress test was reactive without decelerations. Her blood sugars continue to remain well-controlled.    She will continue to return for weekly APFS with a growth ultrasound on 5/15/2025 at 37 weeks 2 days.    Antonia FRAIRE

## 2025-04-22 NOTE — PATIENT INSTRUCTIONS
Patient Education     Pregnancy - The Eighth Month   About this topic   It is important for you to learn how to take care of yourself to help you have a healthy baby and safe delivery. It is good to have health care throughout your pregnancy.  The eighth month of your pregnancy starts around week 33 and lasts through week 36. By knowing how far along you are, you can learn what is normal for this stage of your pregnancy and plan for what is next.  General   Your body   During the eighth month of your pregnancy, here are some things you can expect.  You may:  Be feeling more tired. Rest as much as you can and take small naps if possible. This also helps with swollen feet and ankles.  Feel hot all the time. Be sure to drink plenty of water.  Notice your baby drops more into your pelvis. This makes breathing a bit easier, but you may have to go to the bathroom more often. You may also notice more problems with hemorrhoids.  Have more back pain  Notice clear jelly-like substance flecked with blood when you use the restroom. This is your mucus plug.  Feel less steady on your feet. This is because your hips and joints are preparing for birth.  Be tested for Group Beta Strep (GBS) to see if you will need antibiotics during labor  Gain about 1/2 to 1 pound (.23 to .45 kg) a week for the rest of your pregnancy. It is normal to gain about 5 to 10 pounds (2.3 to 4.5 kg) total in your first 4 months.  Have a BPP, or Biophysical Profile. The doctors do an ultrasound to check your baby’s health if you are at high risk or having problems.  Have a NST, or Non Stress Test. The staff place special monitors around your belly to check the baby’s heart rate and look for contractions.  Your baby's growth and development:  Your baby is almost fully mature but will spend the rest of the time inside of you getting bigger.  Their lungs are the last organ to mature, so it is important that your baby stays in your womb until your due date if  possible.  Their bones are getting stronger each day. The bones in their skull stay a little softer to make delivery easier.  They are able to scratch themselves as their fingernails are developed.  Your baby is becoming protected from germs as they develop antibodies.  They are moving a little less because there is less room.  Your baby is about 19 inches (48 cm) long and weighs about 6 pounds (2,700 gm). Your baby is about the size of a papaya or pineapple.  Things to Think About   Avoid alcohol, drugs, tobacco products, and second hand smoke.  Be sure you know the signs of labor and when to call your doctor.  Are you planning a natural childbirth or thinking about an epidural? Know things you can do to help cope with labor pain.  You may want to learn about cord blood banking.  Do you have everything you need for after the baby is born? Be sure the car seat fits in the car.  When do I need to call the doctor?   Contractions every 10 minutes or more often that do not go away with drinking water or position changes  Headache that does not go away; blurry vision; seeing spots or halos; increase in swelling in your hands, feet, or face; and pain under your ribs on the right side  Low, dull back pain that does not go away  Pressure in your pelvis that feels like your baby is pushing down  A gush or constant trickle of watery or bloody fluid leaking from your vagina  Little to no movement felt by baby in 2 hours. Your baby should be moving at least 10 times every 2 hours.  Vaginal bleeding with or without pain  Fever of 100.4°F (38°C) or higher  After a car accident, fall, or any trauma to your belly  Having thoughts of harming yourself or others, or do not feel safe at home  Last Reviewed Date   2020-05-06  Consumer Information Use and Disclaimer   This generalized information is a limited summary of diagnosis, treatment, and/or medication information. It is not meant to be comprehensive and should be used as a tool  "to help the user understand and/or assess potential diagnostic and treatment options. It does NOT include all information about conditions, treatments, medications, side effects, or risks that may apply to a specific patient. It is not intended to be medical advice or a substitute for the medical advice, diagnosis, or treatment of a health care provider based on the health care provider's examination and assessment of a patient’s specific and unique circumstances. Patients must speak with a health care provider for complete information about their health, medical questions, and treatment options, including any risks or benefits regarding use of medications. This information does not endorse any treatments or medications as safe, effective, or approved for treating a specific patient. UpToDate, Inc. and its affiliates disclaim any warranty or liability relating to this information or the use thereof. The use of this information is governed by the Terms of Use, available at https://www.[employer].Dada/en/know/clinical-effectiveness-terms   Copyright   Copyright © 2024 UpToDate, Inc. and its affiliates and/or licensors. All rights reserved.  Patient Education     Your baby's movement before birth   The Basics   Written by the doctors and editors at [employer]   When should I start feeling my baby move? -- It depends. Most people first feel their baby moving in the uterus between about 16 and 20 weeks of pregnancy. It might take longer to feel movement if this is your first pregnancy or if the placenta is in the front of your uterus.  What kinds of movements should I feel? -- When you first feel your baby move, it might feel like a gentle flutter in your belly. This is sometimes called \"quickening.\" As the baby grows, their movements will get stronger. You will probably feel them kicking, rolling, and stretching. Later in pregnancy, you might be able to see and feel the baby moving from the outside.  You might notice " "that your baby is more active at certain times of the day or night. Even before birth, babies have periods of being asleep and awake. When your baby is sleeping, you might notice that they do not move as much.  Should I keep track of my baby's movements? -- If your pregnancy is healthy, you probably do not need to count or record your baby's movements. Feeling regular movement is a good sign that the baby is doing well.  In some cases, your doctor or midwife might ask you to keep track of your baby's movements. If so, they will tell you how to do this and when to call them.  A change in your baby's movements does not always mean that there is a problem. But in some cases, it can be a sign that the baby is having trouble. If your doctor or midwife is concerned, they can do tests to check on the baby.  If I am asked to track movement, how should I do it? -- There are different ways of tracking your baby's movement. This is sometimes called \"kick counting.\"  Your doctor or midwife will tell you exactly what to track. For example, they might ask you to write down:   How long it takes to feel 10 kicks or movements   How many times your baby moves in 1 hour  Many experts consider at least 10 movements in 2 hours to be a sign that the baby is doing well. But there is no specific cutoff for exactly how much movement is healthy or unhealthy. Some babies are more active than others, and some pregnant people feel movement more easily than others. The main goal of kick counting is to get to know your baby's normal patterns so you can tell if anything changes.  If you are doing kick counting:   Choose a time of day when your baby is usually active.   Find a quiet place where you will not be distracted.   Lie down on your side in a comfortable position.   Check the clock, or set a timer.   Each time you feel your baby move or kick, write down the time. Some people use a smartphone river to keep track.   If your baby seems less " active than usual, try moving around, eating a snack, and emptying your bladder. This can help wake the baby up if they are asleep.   Stop counting after you have felt 10 kicks, or after the length of time your doctor or midwife told you.  When should I call the doctor? -- Call your doctor or midwife for advice if:   You have concerns about your baby's movement.   Your baby is moving less than they normally do.   You notice a sudden change in the pattern of your baby's movements.   You have any other symptoms that worry you.  All topics are updated as new evidence becomes available and our peer review process is complete.  This topic retrieved from [employer] on: Feb 26, 2024.  Topic 952707 Version 1.0  Release: 32.2.4 - C32.56  © 2024 UpToDate, Inc. and/or its affiliates. All rights reserved.  Consumer Information Use and Disclaimer   Disclaimer: This generalized information is a limited summary of diagnosis, treatment, and/or medication information. It is not meant to be comprehensive and should be used as a tool to help the user understand and/or assess potential diagnostic and treatment options. It does NOT include all information about conditions, treatments, medications, side effects, or risks that may apply to a specific patient. It is not intended to be medical advice or a substitute for the medical advice, diagnosis, or treatment of a health care provider based on the health care provider's examination and assessment of a patient's specific and unique circumstances. Patients must speak with a health care provider for complete information about their health, medical questions, and treatment options, including any risks or benefits regarding use of medications. This information does not endorse any treatments or medications as safe, effective, or approved for treating a specific patient. UpToDate, Inc. and its affiliates disclaim any warranty or liability relating to this information or the use thereof.The use  of this information is governed by the Terms of Use, available at https://www.[employer].com/en/know/clinical-effectiveness-terms. 2024© UpToDate, Inc. and its affiliates and/or licensors. All rights reserved.  Copyright   © 2024 UpToDate, Inc. and/or its affiliates. All rights reserved.      Perineal Massage    Perineal massage is recommended starting after 34 weeks in order to reduce risks of perineal tearing during childbirth.  You have been provided and instructional sheet in your yellow 28 week prenatal packet.

## 2025-04-23 ENCOUNTER — PATIENT MESSAGE (OUTPATIENT)
Age: 35
End: 2025-04-23

## 2025-04-23 ENCOUNTER — ROUTINE PRENATAL (OUTPATIENT)
Dept: OBGYN CLINIC | Facility: CLINIC | Age: 35
End: 2025-04-23
Payer: COMMERCIAL

## 2025-04-23 VITALS
BODY MASS INDEX: 44.03 KG/M2 | WEIGHT: 274 LBS | DIASTOLIC BLOOD PRESSURE: 80 MMHG | HEIGHT: 66 IN | SYSTOLIC BLOOD PRESSURE: 124 MMHG

## 2025-04-23 DIAGNOSIS — Z3A.34 34 WEEKS GESTATION OF PREGNANCY: Primary | ICD-10-CM

## 2025-04-23 DIAGNOSIS — O24.019 TYPE 1 DIABETES MELLITUS AFFECTING PREGNANCY, ANTEPARTUM: ICD-10-CM

## 2025-04-23 PROBLEM — O99.213 OBESITY AFFECTING PREGNANCY IN THIRD TRIMESTER: Status: RESOLVED | Noted: 2025-04-22 | Resolved: 2025-04-23

## 2025-04-23 LAB
SL AMB  POCT GLUCOSE, UA: NORMAL
SL AMB POCT URINE PROTEIN: NORMAL

## 2025-04-23 PROCEDURE — PNV: Performed by: OBSTETRICS & GYNECOLOGY

## 2025-04-23 PROCEDURE — 81002 URINALYSIS NONAUTO W/O SCOPE: CPT | Performed by: OBSTETRICS & GYNECOLOGY

## 2025-04-23 NOTE — ASSESSMENT & PLAN NOTE
Following with DP, well controlled on insulin  Lab Results   Component Value Date    HGBA1C 6.0 (H) 03/26/2025

## 2025-04-23 NOTE — PROGRESS NOTES
Problem   34 Weeks Gestation of Pregnancy   Type 1 Diabetes Mellitus Affecting Pregnancy, Antepartum   Obesity Affecting Pregnancy in Third Trimester (Resolved)     34 weeks gestation of pregnancy  Yu Ariza is a 34 y.o.   34w0d who presents for routine PNV.  28 week labs reviewed: NML  TWG 14.3 kg (31 lb 9.6 oz)   Good fetal movement. Denies contractions, cramping, leakage of fluid or vaginal bleeding.   Tdap vaccine completed  Reviewed  labor precautions and FKCs.   Advised to start Perineal massage at 34-36 weeks   Pregnancy Essential guide and Baby and Me web site recommended       Type 1 diabetes mellitus affecting pregnancy, antepartum  Following with DP, well controlled on insulin  Lab Results   Component Value Date    HGBA1C 6.0 (H) 2025

## 2025-04-23 NOTE — TELEPHONE ENCOUNTER
We have a standard pregnancy letter that we can send. We would not recommend what she has described as a general limitation in pregnancy. She can discuss any further questions about this at her next appointment

## 2025-04-23 NOTE — PROGRESS NOTES
Yu Ariza is a 34 y.o. female     34w1d  Pap 2024.Neg HPV Neg GC/CT:2024 Neg / Neg   PN1 Labs: 2024  Blood Type: O Positive    MFM Level 1:2024  MFM Level 2:2025  AFP: 2025  28 Week Labs:3/11/2015 Patient is monitoring her glucose on insulin   TDap:3/13/2025  Flu:  GBS:     Blue Folder: Given   Yellow Folder: given     Breast pump: Patient reports that she is going to get at 36 weeks with her job .  L&D forms:done  Delivery consent: done      Fetal movements: YES  Any concerns at today's visit :None

## 2025-04-25 ENCOUNTER — ULTRASOUND (OUTPATIENT)
Dept: PERINATAL CARE | Facility: OTHER | Age: 35
End: 2025-04-25
Payer: COMMERCIAL

## 2025-04-25 VITALS
BODY MASS INDEX: 43.78 KG/M2 | WEIGHT: 272.4 LBS | HEIGHT: 66 IN | SYSTOLIC BLOOD PRESSURE: 108 MMHG | HEART RATE: 83 BPM | DIASTOLIC BLOOD PRESSURE: 58 MMHG

## 2025-04-25 DIAGNOSIS — O99.213 OBESITY AFFECTING PREGNANCY IN THIRD TRIMESTER, UNSPECIFIED OBESITY TYPE: ICD-10-CM

## 2025-04-25 DIAGNOSIS — O24.019 TYPE 1 DIABETES MELLITUS AFFECTING PREGNANCY, ANTEPARTUM: Primary | ICD-10-CM

## 2025-04-25 DIAGNOSIS — Z3A.34 34 WEEKS GESTATION OF PREGNANCY: ICD-10-CM

## 2025-04-25 PROCEDURE — 76815 OB US LIMITED FETUS(S): CPT

## 2025-04-25 PROCEDURE — 59025 FETAL NON-STRESS TEST: CPT

## 2025-04-25 NOTE — PROGRESS NOTES
"[facility]: Ms. Ariza was seen today at 34w3d gestational age for NST (found under the pregnancy episode) which I reviewed the RN assessment and agree, and WILLAM (see ultrasound report under OB procedures tab).  See ultrasound report under \"OB Procedures\" tab.      Antonia FRAIRE    "

## 2025-04-25 NOTE — PROGRESS NOTES
Repeat Non-Stress Testing:    Patient verbalizes +FM. Pt denies ALL:               Leaking of fluid   Contractions   Vaginal bleeding   Decreased fetal movement    Patient is performing daily kick counts. Patient has no questions or concerns.   NST strip reviewed by YEE Bee in-person.

## 2025-04-29 ENCOUNTER — ROUTINE PRENATAL (OUTPATIENT)
Dept: PERINATAL CARE | Facility: OTHER | Age: 35
End: 2025-04-29
Payer: COMMERCIAL

## 2025-04-29 VITALS
HEIGHT: 66 IN | SYSTOLIC BLOOD PRESSURE: 102 MMHG | HEART RATE: 82 BPM | BODY MASS INDEX: 44.36 KG/M2 | DIASTOLIC BLOOD PRESSURE: 60 MMHG | WEIGHT: 276 LBS

## 2025-04-29 DIAGNOSIS — Z3A.35 35 WEEKS GESTATION OF PREGNANCY: Primary | ICD-10-CM

## 2025-04-29 DIAGNOSIS — O24.019 TYPE 1 DIABETES MELLITUS AFFECTING PREGNANCY, ANTEPARTUM: ICD-10-CM

## 2025-04-29 PROCEDURE — 59025 FETAL NON-STRESS TEST: CPT

## 2025-04-29 NOTE — PROGRESS NOTES
[facility]: Ms. Ariza was seen today at 35w0d gestational age for NST (found under the pregnancy episode) which I reviewed the RN assessment and agree.     NST shows reactivity without decelerations.   Antonia FRAIRE

## 2025-05-01 ENCOUNTER — ULTRASOUND (OUTPATIENT)
Dept: PERINATAL CARE | Facility: OTHER | Age: 35
End: 2025-05-01
Payer: COMMERCIAL

## 2025-05-01 VITALS
WEIGHT: 274.2 LBS | SYSTOLIC BLOOD PRESSURE: 94 MMHG | DIASTOLIC BLOOD PRESSURE: 50 MMHG | HEART RATE: 89 BPM | HEIGHT: 66 IN | BODY MASS INDEX: 44.07 KG/M2

## 2025-05-01 DIAGNOSIS — Z3A.35 35 WEEKS GESTATION OF PREGNANCY: Primary | ICD-10-CM

## 2025-05-01 DIAGNOSIS — O24.019 TYPE 1 DIABETES MELLITUS AFFECTING PREGNANCY, ANTEPARTUM: ICD-10-CM

## 2025-05-01 PROCEDURE — 76815 OB US LIMITED FETUS(S): CPT | Performed by: OBSTETRICS & GYNECOLOGY

## 2025-05-01 PROCEDURE — 59025 FETAL NON-STRESS TEST: CPT | Performed by: OBSTETRICS & GYNECOLOGY

## 2025-05-01 NOTE — PROGRESS NOTES
Repeat Non-Stress Testing:    Patient verbalizes +FM. Pt denies ALL:               Leaking of fluid   Contractions   Vaginal bleeding   Decreased fetal movement    Patient is performing daily kick counts. Patient has no questions or concerns.   NST strip reviewed by Dr. Paredes in-person.

## 2025-05-05 DIAGNOSIS — E13.10 DIABETIC KETOACIDOSIS WITHOUT COMA ASSOCIATED WITH OTHER SPECIFIED DIABETES MELLITUS (HCC): ICD-10-CM

## 2025-05-05 PROBLEM — Z3A.35 35 WEEKS GESTATION OF PREGNANCY: Status: ACTIVE | Noted: 2025-04-17

## 2025-05-06 ENCOUNTER — ROUTINE PRENATAL (OUTPATIENT)
Dept: PERINATAL CARE | Facility: OTHER | Age: 35
End: 2025-05-06
Payer: COMMERCIAL

## 2025-05-06 VITALS
WEIGHT: 276 LBS | BODY MASS INDEX: 44.36 KG/M2 | HEART RATE: 99 BPM | DIASTOLIC BLOOD PRESSURE: 50 MMHG | HEIGHT: 66 IN | SYSTOLIC BLOOD PRESSURE: 110 MMHG

## 2025-05-06 DIAGNOSIS — Z3A.36 36 WEEKS GESTATION OF PREGNANCY: Primary | ICD-10-CM

## 2025-05-06 DIAGNOSIS — O24.019 TYPE 1 DIABETES MELLITUS AFFECTING PREGNANCY, ANTEPARTUM: ICD-10-CM

## 2025-05-06 PROCEDURE — 59025 FETAL NON-STRESS TEST: CPT

## 2025-05-06 RX ORDER — INSULIN GLARGINE 100 [IU]/ML
38 INJECTION, SOLUTION SUBCUTANEOUS
Qty: 15 ML | Refills: 0 | Status: SHIPPED | OUTPATIENT
Start: 2025-05-06 | End: 2025-08-10

## 2025-05-06 NOTE — PROGRESS NOTES
[facility]: Ms. Ariza was seen today at 36w0d gestational age for NST (found under the pregnancy episode) which I reviewed the RN assessment and agree.     NST shows reactivity without decelerations.   Antonia FRAIRE    
Repeat Non-Stress Testing:    Patient verbalizes +FM. Pt denies ALL:               Leaking of fluid   Contractions   Vaginal bleeding   Decreased fetal movement    Patient is performing daily kick counts. Patient has no questions or concerns.   NST strip reviewed by YEE Bee in-person.   
PAST SURGICAL HISTORY:  H/O bilateral breast reduction surgery     H/O carpal tunnel repair right 7/2015,   left 2/2016    H/O Mohs micrographic surgery for skin cancer     History of lumbar fusion     History of lumbar laminectomy and fusion  L1-3  with interbody fusion      6/2014    History of lumbar laminectomy L 3-5   2/2012    S/P biopsy

## 2025-05-08 ENCOUNTER — ROUTINE PRENATAL (OUTPATIENT)
Age: 35
End: 2025-05-08
Payer: COMMERCIAL

## 2025-05-08 ENCOUNTER — ULTRASOUND (OUTPATIENT)
Dept: PERINATAL CARE | Facility: OTHER | Age: 35
End: 2025-05-08
Payer: COMMERCIAL

## 2025-05-08 VITALS
HEIGHT: 66 IN | DIASTOLIC BLOOD PRESSURE: 82 MMHG | HEART RATE: 90 BPM | BODY MASS INDEX: 44.2 KG/M2 | SYSTOLIC BLOOD PRESSURE: 124 MMHG | WEIGHT: 275 LBS

## 2025-05-08 VITALS
DIASTOLIC BLOOD PRESSURE: 58 MMHG | WEIGHT: 277.4 LBS | SYSTOLIC BLOOD PRESSURE: 114 MMHG | HEIGHT: 66 IN | BODY MASS INDEX: 44.58 KG/M2 | HEART RATE: 92 BPM

## 2025-05-08 DIAGNOSIS — Z3A.36 36 WEEKS GESTATION OF PREGNANCY: Primary | ICD-10-CM

## 2025-05-08 DIAGNOSIS — O99.213 SEVERE OBESITY DUE TO EXCESS CALORIES AFFECTING PREGNANCY IN THIRD TRIMESTER (HCC): ICD-10-CM

## 2025-05-08 DIAGNOSIS — Z3A.36 36 WEEKS GESTATION OF PREGNANCY: ICD-10-CM

## 2025-05-08 DIAGNOSIS — O24.013 TYPE 1 DIABETES MELLITUS COMPLICATING PREGNANCY, ANTEPARTUM, THIRD TRIMESTER: Primary | ICD-10-CM

## 2025-05-08 DIAGNOSIS — Z34.03 PRENATAL CARE, FIRST PREGNANCY IN THIRD TRIMESTER: ICD-10-CM

## 2025-05-08 DIAGNOSIS — O24.019 TYPE 1 DIABETES MELLITUS AFFECTING PREGNANCY, ANTEPARTUM: ICD-10-CM

## 2025-05-08 DIAGNOSIS — E66.01 SEVERE OBESITY DUE TO EXCESS CALORIES AFFECTING PREGNANCY IN THIRD TRIMESTER (HCC): ICD-10-CM

## 2025-05-08 LAB
SL AMB  POCT GLUCOSE, UA: NORMAL
SL AMB POCT URINE PROTEIN: NORMAL

## 2025-05-08 PROCEDURE — 59025 FETAL NON-STRESS TEST: CPT | Performed by: OBSTETRICS & GYNECOLOGY

## 2025-05-08 PROCEDURE — 76815 OB US LIMITED FETUS(S): CPT | Performed by: OBSTETRICS & GYNECOLOGY

## 2025-05-08 PROCEDURE — 87150 DNA/RNA AMPLIFIED PROBE: CPT | Performed by: OBSTETRICS & GYNECOLOGY

## 2025-05-08 PROCEDURE — PNV: Performed by: OBSTETRICS & GYNECOLOGY

## 2025-05-08 PROCEDURE — 81002 URINALYSIS NONAUTO W/O SCOPE: CPT | Performed by: OBSTETRICS & GYNECOLOGY

## 2025-05-08 NOTE — PROGRESS NOTES
Please refer to the [facility] ultrasound report in Ob Procedures for additional information regarding today's visit

## 2025-05-08 NOTE — LETTER
May 8, 2025     Nina Curiel MD  [address]  [address]  [place] [address]    Patient: Yu Ariza   YOB: 1990   Date of Visit: 5/8/2025       Dear Dr. Nina Curiel MD:    Thank you for referring Yu Ariza to me for evaluation. Below are my notes for this consultation.    If you have questions, please do not hesitate to call me. I look forward to following your patient along with you.         Sincerely,        Chris Lombardi MD        CC: No Recipients    Chris Lombardi MD  5/8/2025  4:03 PM  Sign when Signing Visit  Please refer to the [facility] ultrasound report in Ob Procedures for additional information regarding today's visit

## 2025-05-08 NOTE — ASSESSMENT & PLAN NOTE
Pt doing well, no complaints  +FM. Denies ctx, vb and lof.   GBS collected today, reviewed  IOL 39w-39w 6d for T1DM on insulin, reviewed with pt today. Plan for SVE, and induction consents next visit.   Precautions reviewed. RTO in 1 week

## 2025-05-08 NOTE — PROGRESS NOTES
"Problem List Items Addressed This Visit       Type 1 diabetes mellitus affecting pregnancy, antepartum    BG well controlled per pt  Still on 38u of lantus  Continue APFS  Lab Results   Component Value Date    HGBA1C 6.0 (H) 2025            36 weeks gestation of pregnancy - Primary    Pt doing well, no complaints  +FM. Denies ctx, vb and lof.   GBS collected today, reviewed  IOL 39w-39w 6d for T1DM on insulin, reviewed with pt today. Plan for SVE, and induction consents next visit.   Precautions reviewed. RTO in 1 week           Obesity affecting pregnancy in third trimester    TWG 37 lbs  Last growth scan 4/15 EFW 47%ile  Growth u/s scheduled for 5/15          Other Visit Diagnoses         Prenatal care, first pregnancy in third trimester        Relevant Orders    POCT urine dip (Completed)    Strep B DNA probe, amplification            Yu Ariza is a 34 y.o.  at 36w2d who presents today for routine prenatal visit.     /82 (BP Location: Left arm, Patient Position: Sitting, Cuff Size: Adult)   Pulse 90   Ht 5' 6\" (1.676 m)   Wt 125 kg (275 lb)   LMP 2024 (Exact Date)   BMI 44.39 kg/m²       FH 36 cm    "

## 2025-05-08 NOTE — ASSESSMENT & PLAN NOTE
BG well controlled per pt  Still on 38u of lantus  Continue APFS  Lab Results   Component Value Date    HGBA1C 6.0 (H) 03/26/2025

## 2025-05-08 NOTE — PROGRESS NOTES
Patient presents for a routine prenatal visit    36W2D  Good Fetal Movement  No LOF,bleeding,discharge or cramping.  No current complaints at this time.  Delivery consent is signed.  UTD on Tdap vaccine.

## 2025-05-08 NOTE — PROGRESS NOTES
Repeat Non-Stress Testing:    Patient verbalizes +FM. Pt denies ALL:               Leaking of fluid   Contractions   Vaginal bleeding   Decreased fetal movement    Patient is performing daily kick counts. Patient has no questions or concerns.   NST strip reviewed by Dr. Lombardi in-person.

## 2025-05-10 LAB — GP B STREP DNA SPEC QL NAA+PROBE: NEGATIVE

## 2025-05-12 ENCOUNTER — RESULTS FOLLOW-UP (OUTPATIENT)
Dept: OTHER | Facility: HOSPITAL | Age: 35
End: 2025-05-12

## 2025-05-12 NOTE — PATIENT INSTRUCTIONS
Kick Counts in Pregnancy   AMBULATORY CARE:   Kick counts  measure how much your baby is moving in your womb. A kick from your baby can be felt as a twist, turn, swish, roll, or jab. It is common to feel your baby kicking at 26 to 28 weeks of pregnancy. You may feel your baby kick as early as 20 weeks of pregnancy. You may want to start counting at 28 weeks.   Contact your doctor immediately if:   You feel a change in the number of kicks or movements of your baby.      You feel fewer than 10 kicks within 2 hours.      You have questions or concerns about your baby's movements.     Why measure kick counts:  Your baby's movement may provide information about your baby's health. He or she may move less, or not at all, if there are problems. Your baby may move less if he or she is not getting enough oxygen or nutrition from the placenta. Do not smoke while you are pregnant. Smoking decreases the amount of oxygen that gets to your baby. Talk to your healthcare provider if you need help to quit smoking. Tell your healthcare provider as soon as you feel a change in your baby's movements.  When to measure kick counts:   Measure kick counts at the same time every day.       Measure kick counts when your baby is awake and most active. Your baby may be most active in the evening.     How to measure kick counts:  Check that your baby is awake before you measure kick counts. You can wake up your baby by lightly pushing on your belly, walking, or drinking something cold. Your healthcare provider may tell you different ways to measure kick counts. You may be told to do the following:  Use a chart or clock to keep track of the time you start and finish counting.      Sit in a chair or lie on your left side.      Place your hands on the largest part of your belly.      Count until you reach 10 kicks. Write down how much time it takes to count 10 kicks.      It may take 30 minutes to 2 hours to count 10 kicks. It should not take more  than 2 hours to count 10 kicks.     Follow up with your doctor as directed:  Write down your questions so you remember to ask them during your visits.   ©  [employer]2023 Information is for End User's use only and may not be sold, redistributed or otherwise used for commercial purposes.  The above information is an  only. It is not intended as medical advice for individual conditions or treatments. Talk to your doctor, nurse or pharmacist before following any medical regimen to see if it is safe and effective for you. Thank you for choosing us for your  care today.  If you have any questions about your ultrasound or care, please do not hesitate to contact us or your primary obstetrician.        Some general instructions for your pregnancy are:    Exercise: Aim for 150 minutes per week of regular exercise.  Walking is great!  Nutrition: Choose healthy sources of calcium, iron, and protein.  Avoid ultraprocessed foods and added sugar.  Learn about Preeclampsia: preeclampsia is a common, potentially serious high blood pressure complication in pregnancy.  A blood pressure of 140mmHg (systolic or top number) or 90mmHg (diastolic or bottom number) should be evaluated by your doctor.  Aspirin is sometimes prescribed in early pregnancy to prevent preeclampsia in women with risk factors - ask your obstetrician if you should be on this medication.  For more resources, visit:  https://www.highriskpregnancyinfo.org/preeclampsia  If you smoke, please try to quit completely but also try to reduce your smoking by as much as possible (as soon as possible).  Do not vape.  Please also avoid cannabis products.  Other warning signs to watch out for in pregnancy or postpartum: chest pain, obstructed breathing or shortness of breath, seizures, thoughts of hurting yourself or your baby, bleeding, a painful or swollen leg, fever, or headache (see [facility] POST-BIRTH Warning Signs campaign).  If these happen  call 911.  Itching is also not normal in pregnancy and if you experience this, especially over your hands and feet, potentially worse at night, notify your doctors.     Kick Counts in Pregnancy   AMBULATORY CARE:   Kick counts  measure how much your baby is moving in your womb. A kick from your baby can be felt as a twist, turn, swish, roll, or jab. It is common to feel your baby kicking at 26 to 28 weeks of pregnancy. You may feel your baby kick as early as 20 weeks of pregnancy. You may want to start counting at 28 weeks.   Contact your doctor immediately if:   You feel a change in the number of kicks or movements of your baby.      You feel fewer than 10 kicks within 2 hours.      You have questions or concerns about your baby's movements.     Why measure kick counts:  Your baby's movement may provide information about your baby's health. He or she may move less, or not at all, if there are problems. Your baby may move less if he or she is not getting enough oxygen or nutrition from the placenta. Do not smoke while you are pregnant. Smoking decreases the amount of oxygen that gets to your baby. Talk to your healthcare provider if you need help to quit smoking. Tell your healthcare provider as soon as you feel a change in your baby's movements.  When to measure kick counts:   Measure kick counts at the same time every day.       Measure kick counts when your baby is awake and most active. Your baby may be most active in the evening.     How to measure kick counts:  Check that your baby is awake before you measure kick counts. You can wake up your baby by lightly pushing on your belly, walking, or drinking something cold. Your healthcare provider may tell you different ways to measure kick counts. You may be told to do the following:  Use a chart or clock to keep track of the time you start and finish counting.      Sit in a chair or lie on your left side.      Place your hands on the largest part of your belly.       Count until you reach 10 kicks. Write down how much time it takes to count 10 kicks.      It may take 30 minutes to 2 hours to count 10 kicks. It should not take more than 2 hours to count 10 kicks.     Follow up with your doctor as directed:  Write down your questions so you remember to ask them during your visits.   © Copyright [employer] 2023 Information is for End User's use only and may not be sold, redistributed or otherwise used for commercial purposes.  The above information is an  only. It is not intended as medical advice for individual conditions or treatments. Talk to your doctor, nurse or pharmacist before following any medical regimen to see if it is safe and effective for you. Nonstress Test for Pregnancy   WHAT YOU NEED TO KNOW:   What do I need to know about a nonstress test?  A nonstress test measures your baby's heart rate and movements. Nonstress means that no stress will be placed on your baby during the test.  How do I prepare for a nonstress test?  Your healthcare provider will talk to you about how to prepare for this test. Your provider may tell you to eat and drink plenty of liquids before your test. If you smoke, you may be asked not to smoke within 2 hours before the test. Your provider will also tell you which medicines to take or not take on the day of your test.  What will happen during a nonstress test?  You may be asked to lie down or recline back for the test on a bed. One or 2 belts with sensors will be placed around your abdomen. Your baby's heart rate will be recorded with a machine. If your baby does not move, your baby may be asleep. Your healthcare provider may make a noise near your abdomen to try to wake your baby. The test usually takes about 20 minutes, but can take longer if your baby needs to be awakened.        What do I need to know about the test results?  Your baby will be expected to move at least 2 times for a certain amount of time. Your baby's  heart rate will be expected to go up by a certain number of beats per minute during movement. If your baby does not move as expected, the test may need to be repeated or you may need other tests.  CARE AGREEMENT:   You have the right to help plan your care. Learn about your health condition and how it may be treated. Discuss treatment options with your healthcare providers to decide what care you want to receive. You always have the right to refuse treatment. The above information is an  only. It is not intended as medical advice for individual conditions or treatments. Talk to your doctor, nurse or pharmacist before following any medical regimen to see if it is safe and effective for you.  © Copyright [employer] 2023 Information is for End User's use only and may not be sold, redistributed or otherwise used for commercial purposes.

## 2025-05-13 ENCOUNTER — ROUTINE PRENATAL (OUTPATIENT)
Dept: PERINATAL CARE | Facility: OTHER | Age: 35
End: 2025-05-13
Payer: COMMERCIAL

## 2025-05-13 ENCOUNTER — ROUTINE PRENATAL (OUTPATIENT)
Dept: OBGYN CLINIC | Facility: CLINIC | Age: 35
End: 2025-05-13
Payer: COMMERCIAL

## 2025-05-13 ENCOUNTER — TELEPHONE (OUTPATIENT)
Dept: OBGYN CLINIC | Facility: CLINIC | Age: 35
End: 2025-05-13

## 2025-05-13 VITALS
HEIGHT: 66 IN | HEART RATE: 100 BPM | WEIGHT: 276.8 LBS | DIASTOLIC BLOOD PRESSURE: 60 MMHG | SYSTOLIC BLOOD PRESSURE: 110 MMHG | BODY MASS INDEX: 44.48 KG/M2

## 2025-05-13 VITALS — SYSTOLIC BLOOD PRESSURE: 130 MMHG | DIASTOLIC BLOOD PRESSURE: 76 MMHG | BODY MASS INDEX: 44.71 KG/M2 | WEIGHT: 277 LBS

## 2025-05-13 DIAGNOSIS — O24.019 TYPE 1 DIABETES MELLITUS AFFECTING PREGNANCY, ANTEPARTUM: ICD-10-CM

## 2025-05-13 DIAGNOSIS — E10.9 TYPE 1 DIABETES MELLITUS ON INSULIN THERAPY (HCC): ICD-10-CM

## 2025-05-13 DIAGNOSIS — Z3A.37 37 WEEKS GESTATION OF PREGNANCY: Primary | ICD-10-CM

## 2025-05-13 DIAGNOSIS — Z3A.37 37 WEEKS GESTATION OF PREGNANCY: ICD-10-CM

## 2025-05-13 DIAGNOSIS — Z34.03 PRENATAL CARE, FIRST PREGNANCY IN THIRD TRIMESTER: Primary | ICD-10-CM

## 2025-05-13 LAB
SL AMB  POCT GLUCOSE, UA: NORMAL
SL AMB POCT URINE PROTEIN: NORMAL

## 2025-05-13 PROCEDURE — 81002 URINALYSIS NONAUTO W/O SCOPE: CPT | Performed by: OBSTETRICS & GYNECOLOGY

## 2025-05-13 PROCEDURE — PNV: Performed by: OBSTETRICS & GYNECOLOGY

## 2025-05-13 PROCEDURE — 59025 FETAL NON-STRESS TEST: CPT

## 2025-05-13 NOTE — ASSESSMENT & PLAN NOTE
Induction planning 39-40 weeks.  Induction consent signed, will forward to OB navigator for scheduling. Closed/no effacement/high station  Lab Results   Component Value Date    HGBA1C 6.0 (H) 03/26/2025             no

## 2025-05-13 NOTE — PROGRESS NOTES
[facility]: Ms. Ariza was seen today at 37w0d gestational age for NST (found under the pregnancy episode) which I reviewed the RN assessment and agree.     NST shows reactivity without decelerations.   Antonia FRAIRE

## 2025-05-13 NOTE — TELEPHONE ENCOUNTER
----- Message from Louann Jon MD sent at 5/13/2025 12:26 PM EDT -----  Procedure to be scheduled  Induction  WHITNEY: Estimated Date of Delivery: 6/3/25  Indication for delivery: type 1 DM, 39 w  Requested date (s) of delivery: 5/27 is  39w  If requested date is unavailable, is there a date by which the pt must be delivered? By due date  Physician preference: n/a  If IOL, anticipated method: cyto/ashraf

## 2025-05-13 NOTE — PROGRESS NOTES
Name: Yu Ariza      : 1990      MRN: [id number]  Encounter Provider: Louann Jon MD  Encounter Date: 2025   Encounter department: [facility]  :  Assessment & Plan  Prenatal care, first pregnancy in third trimester    Orders:    POCT urine dip    37 weeks gestation of pregnancy  Signs of labor and fetal kick counts discussed.   Induction consent signed and reviewed.        Type 1 diabetes mellitus affecting pregnancy, antepartum  Induction planning 39-40 weeks.  Induction consent signed, will forward to OB navigator for scheduling. Closed/no effacement/high station  Lab Results   Component Value Date    HGBA1C 6.0 (H) 2025                History of Present Illness   HPI  Yu Ariza is a 34 y.o. female who presents for prenatal visit today.    GA: 37w0d  WHITNEY: 6/3/25    Denies LOF, VB, CTX  +FM    Urine: Protein neg / Glucose neg  Labs utd    Blue folder given at PN1  Yellow folder given   Delivery consent signed  Birth plan returned  Breast pump--has  Tdap given  Rhogam not needed. O+  GBS negative    Would like to discuss eIOL.

## 2025-05-13 NOTE — Clinical Note
Procedure to be scheduled  Induction WHITNEY: Estimated Date of Delivery: 6/3/25 Indication for delivery: type 1 DM, 39 w Requested date (s) of delivery: 5/27 is  39w If requested date is unavailable, is there a date by which the pt must be delivered? By due date Physician preference: n/a If IOL, anticipated method: cyto/ashraf

## 2025-05-14 NOTE — TELEPHONE ENCOUNTER
5/27 8 pm PUMA into 5/28 NP  LEFT MESSAGE  Patient notified of IOL date,time,and location. Advised patient she may eat a light breakfast/dinner prior to going to L&D. In the interim please report any vaginal bleeding,leakage of fluid,decreased fetal movement or contractions.Reviewed fetal kick counts. Advised to keep all upcoming prenatal visits.

## 2025-05-15 ENCOUNTER — ULTRASOUND (OUTPATIENT)
Dept: PERINATAL CARE | Facility: OTHER | Age: 35
End: 2025-05-15
Payer: COMMERCIAL

## 2025-05-15 VITALS
WEIGHT: 278 LBS | HEART RATE: 84 BPM | BODY MASS INDEX: 44.68 KG/M2 | HEIGHT: 66 IN | DIASTOLIC BLOOD PRESSURE: 60 MMHG | SYSTOLIC BLOOD PRESSURE: 120 MMHG

## 2025-05-15 DIAGNOSIS — E10.9 TYPE 1 DIABETES MELLITUS ON INSULIN THERAPY (HCC): ICD-10-CM

## 2025-05-15 DIAGNOSIS — Z3A.37 37 WEEKS GESTATION OF PREGNANCY: Primary | ICD-10-CM

## 2025-05-15 DIAGNOSIS — O24.019 TYPE 1 DIABETES MELLITUS AFFECTING PREGNANCY, ANTEPARTUM: ICD-10-CM

## 2025-05-15 PROCEDURE — 76816 OB US FOLLOW-UP PER FETUS: CPT | Performed by: OBSTETRICS & GYNECOLOGY

## 2025-05-15 PROCEDURE — 59025 FETAL NON-STRESS TEST: CPT | Performed by: OBSTETRICS & GYNECOLOGY

## 2025-05-15 PROCEDURE — 99213 OFFICE O/P EST LOW 20 MIN: CPT | Performed by: OBSTETRICS & GYNECOLOGY

## 2025-05-20 ENCOUNTER — ROUTINE PRENATAL (OUTPATIENT)
Dept: PERINATAL CARE | Facility: OTHER | Age: 35
End: 2025-05-20
Payer: COMMERCIAL

## 2025-05-20 VITALS
DIASTOLIC BLOOD PRESSURE: 60 MMHG | HEART RATE: 88 BPM | WEIGHT: 280.4 LBS | BODY MASS INDEX: 45.06 KG/M2 | SYSTOLIC BLOOD PRESSURE: 123 MMHG | HEIGHT: 66 IN

## 2025-05-20 DIAGNOSIS — Z3A.38 38 WEEKS GESTATION OF PREGNANCY: Primary | ICD-10-CM

## 2025-05-20 DIAGNOSIS — O24.013 PREGNANCY COMPLICATED BY PRE-EXISTING TYPE 1 DIABETES, THIRD TRIMESTER: ICD-10-CM

## 2025-05-20 PROCEDURE — 59025 FETAL NON-STRESS TEST: CPT | Performed by: OBSTETRICS & GYNECOLOGY

## 2025-05-20 NOTE — PATIENT INSTRUCTIONS
Thank you for choosing us for your  care today.  If you have any questions about your ultrasound or care, please do not hesitate to contact us or your primary obstetrician.        Some general instructions for your pregnancy are:    Exercise: Aim for 150 minutes per week of regular exercise.  Walking is great!  Nutrition: Choose healthy sources of calcium, iron, and protein.  Avoid ultraprocessed foods and added sugar.  Learn about Preeclampsia: preeclampsia is a common, potentially serious high blood pressure complication in pregnancy.  A blood pressure of 140mmHg (systolic or top number) or 90mmHg (diastolic or bottom number) should be evaluated by your doctor.  Aspirin is sometimes prescribed in early pregnancy to prevent preeclampsia in women with risk factors - ask your obstetrician if you should be on this medication.  For more resources, visit:  https://www.highriskpregnancyinfo.org/preeclampsia  If you smoke, please try to quit completely but also try to reduce your smoking by as much as possible (as soon as possible).  Do not vape.  Please also avoid cannabis products.  Other warning signs to watch out for in pregnancy or postpartum: chest pain, obstructed breathing or shortness of breath, seizures, thoughts of hurting yourself or your baby, bleeding, a painful or swollen leg, fever, or headache (see [facility] Warning Signs [facility]).  If these happen call 911.  Itching is also not normal in pregnancy and if you experience this, especially over your hands and feet, potentially worse at night, notify your doctors.     Kick Counts in Pregnancy   AMBULATORY CARE:   Kick counts  measure how much your baby is moving in your womb. A kick from your baby can be felt as a twist, turn, swish, roll, or jab. It is common to feel your baby kicking at 26 to 28 weeks of pregnancy. You may feel your baby kick as early as 20 weeks of pregnancy. You may want to start counting at 28 weeks.   Contact your  doctor immediately if:   You feel a change in the number of kicks or movements of your baby.      You feel fewer than 10 kicks within 2 hours.      You have questions or concerns about your baby's movements.     Why measure kick counts:  Your baby's movement may provide information about your baby's health. He or she may move less, or not at all, if there are problems. Your baby may move less if he or she is not getting enough oxygen or nutrition from the placenta. Do not smoke while you are pregnant. Smoking decreases the amount of oxygen that gets to your baby. Talk to your healthcare provider if you need help to quit smoking. Tell your healthcare provider as soon as you feel a change in your baby's movements.  When to measure kick counts:   Measure kick counts at the same time every day.       Measure kick counts when your baby is awake and most active. Your baby may be most active in the evening.     How to measure kick counts:  Check that your baby is awake before you measure kick counts. You can wake up your baby by lightly pushing on your belly, walking, or drinking something cold. Your healthcare provider may tell you different ways to measure kick counts. You may be told to do the following:  Use a chart or clock to keep track of the time you start and finish counting.      Sit in a chair or lie on your left side.      Place your hands on the largest part of your belly.      Count until you reach 10 kicks. Write down how much time it takes to count 10 kicks.      It may take 30 minutes to 2 hours to count 10 kicks. It should not take more than 2 hours to count 10 kicks.     Follow up with your doctor as directed:  Write down your questions so you remember to ask them during your visits.   © Copyright [employer] 2023 Information is for End User's use only and may not be sold, redistributed or otherwise used for commercial purposes.  The above information is an  only. It is not intended as  medical advice for individual conditions or treatments. Talk to your doctor, nurse or pharmacist before following any medical regimen to see if it is safe and effective for you. Nonstress Test for Pregnancy   WHAT YOU NEED TO KNOW:   What do I need to know about a nonstress test?  A nonstress test measures your baby's heart rate and movements. Nonstress means that no stress will be placed on your baby during the test.  How do I prepare for a nonstress test?  Your healthcare provider will talk to you about how to prepare for this test. Your provider may tell you to eat and drink plenty of liquids before your test. If you smoke, you may be asked not to smoke within 2 hours before the test. Your provider will also tell you which medicines to take or not take on the day of your test.  What will happen during a nonstress test?  You may be asked to lie down or recline back for the test on a bed. One or 2 belts with sensors will be placed around your abdomen. Your baby's heart rate will be recorded with a machine. If your baby does not move, your baby may be asleep. Your healthcare provider may make a noise near your abdomen to try to wake your baby. The test usually takes about 20 minutes, but can take longer if your baby needs to be awakened.        What do I need to know about the test results?  Your baby will be expected to move at least 2 times for a certain amount of time. Your baby's heart rate will be expected to go up by a certain number of beats per minute during movement. If your baby does not move as expected, the test may need to be repeated or you may need other tests.  CARE AGREEMENT:   You have the right to help plan your care. Learn about your health condition and how it may be treated. Discuss treatment options with your healthcare providers to decide what care you want to receive. You always have the right to refuse treatment. The above information is an  only. It is not intended as  medical advice for individual conditions or treatments. Talk to your doctor, nurse or pharmacist before following any medical regimen to see if it is safe and effective for you.  © Copyright [employer] 2023 Information is for End User's use only and may not be sold, redistributed or otherwise used for commercial purposes.

## 2025-05-20 NOTE — ASSESSMENT & PLAN NOTE
NST is reactive.  Amniotic fluid volume is normal.  Continuation of twice per week nonstress testing and weekly amniotic fluid volume assessments is recommended.  Lab Results   Component Value Date    HGBA1C 6.0 (H) 03/26/2025

## 2025-05-20 NOTE — PROGRESS NOTES
"Name: Yu Ariza      : 1990      MRN: [id number]  Encounter Provider: Chris Lombardi MD  Encounter Date: 2025   Encounter department: [facility]  :  Assessment & Plan  38 weeks gestation of pregnancy         Pregnancy complicated by pre-existing type 1 diabetes, third trimester  NST is reactive.  Amniotic fluid volume is normal.  Continuation of twice per week nonstress testing and weekly amniotic fluid volume assessments is recommended.  Lab Results   Component Value Date    HGBA1C 6.0 (H) 2025                History of Present Illness   HPI  Yu Ariza is a 34 y.o. female who presents for nonstress testing for the indication of type 1 diabetes.      Review of Systems       Objective   /60 (BP Location: Left arm, Patient Position: Sitting, Cuff Size: Extra-Large)   Pulse 88   Ht 5' 6\" (1.676 m)   Wt 127 kg (280 lb 6.4 oz)   LMP 2024 (Exact Date)   BMI 45.26 kg/m²      Physical Exam      "

## 2025-05-21 ENCOUNTER — ROUTINE PRENATAL (OUTPATIENT)
Age: 35
End: 2025-05-21
Payer: COMMERCIAL

## 2025-05-21 VITALS
HEART RATE: 88 BPM | HEIGHT: 66 IN | WEIGHT: 280.4 LBS | SYSTOLIC BLOOD PRESSURE: 112 MMHG | BODY MASS INDEX: 45.06 KG/M2 | DIASTOLIC BLOOD PRESSURE: 72 MMHG

## 2025-05-21 DIAGNOSIS — Z34.03 PRENATAL CARE, FIRST PREGNANCY IN THIRD TRIMESTER: Primary | ICD-10-CM

## 2025-05-21 DIAGNOSIS — O99.213 SEVERE OBESITY DUE TO EXCESS CALORIES AFFECTING PREGNANCY IN THIRD TRIMESTER (HCC): ICD-10-CM

## 2025-05-21 DIAGNOSIS — O24.019 TYPE 1 DIABETES MELLITUS AFFECTING PREGNANCY, ANTEPARTUM: ICD-10-CM

## 2025-05-21 DIAGNOSIS — E66.01 SEVERE OBESITY DUE TO EXCESS CALORIES AFFECTING PREGNANCY IN THIRD TRIMESTER (HCC): ICD-10-CM

## 2025-05-21 DIAGNOSIS — Z3A.38 38 WEEKS GESTATION OF PREGNANCY: ICD-10-CM

## 2025-05-21 LAB
SL AMB  POCT GLUCOSE, UA: ABNORMAL
SL AMB POCT URINE PROTEIN: ABNORMAL

## 2025-05-21 PROCEDURE — PNV: Performed by: OBSTETRICS & GYNECOLOGY

## 2025-05-21 PROCEDURE — 81002 URINALYSIS NONAUTO W/O SCOPE: CPT | Performed by: OBSTETRICS & GYNECOLOGY

## 2025-05-21 NOTE — PROGRESS NOTES
Patient presents for a routine prenatal visit    38W1D  Good Fetal Movement  No LOF,bleeding,discharge or cramping.  No current complaints at this time.  IOL scheduled for , induction and delivery consents are signed.  UTD on Tdap vaccine.  GBS neg

## 2025-05-21 NOTE — PROGRESS NOTES
"Problem List Items Addressed This Visit       Type 1 diabetes mellitus affecting pregnancy, antepartum    Following with DP, BG within goal  Continue APFS  IOL scheduled for   Lab Results   Component Value Date    HGBA1C 6.0 (H) 2025            38 weeks gestation of pregnancy    Pt doing well today, no complaints  +FM. Denies ctx, vb and lof.   She is feeling well, ready for induction next week  She declines SVE today- is agreeable to check at next visit. Was c/t/h last visit. Reviewed likely will still need ripening for induction next week.   GBS negative  Precautions reviewed. RTO in 1 week         Obesity affecting pregnancy in third trimester    TWG 41 lbs  Last growth scan on 5/15 EFW 80%ile, AC 97%ile          Other Visit Diagnoses         Prenatal care, first pregnancy in third trimester    -  Primary    Relevant Orders    POCT urine dip (Completed)            Yu Ariza is a 34 y.o.  at 38w1d who presents today for routine prenatal visit.     /72 (BP Location: Left arm, Patient Position: Sitting, Cuff Size: Large)   Pulse 88   Ht 5' 6\" (1.676 m)   Wt 127 kg (280 lb 6.4 oz)   LMP 2024 (Exact Date)   BMI 45.26 kg/m²       FH 39 cm    "

## 2025-05-21 NOTE — ASSESSMENT & PLAN NOTE
Pt doing well today, no complaints  +FM. Denies ctx, vb and lof.   She is feeling well, ready for induction next week  She declines SVE today- is agreeable to check at next visit. Was c/t/h last visit. Reviewed likely will still need ripening for induction next week.   GBS negative  Precautions reviewed. RTO in 1 week

## 2025-05-21 NOTE — ASSESSMENT & PLAN NOTE
Following with DP, BG within goal  Continue APFS  IOL scheduled for 5/27  Lab Results   Component Value Date    HGBA1C 6.0 (H) 03/26/2025

## 2025-05-22 ENCOUNTER — ANCILLARY PROCEDURE (OUTPATIENT)
Dept: PERINATAL CARE | Facility: OTHER | Age: 35
End: 2025-05-22
Attending: OBSTETRICS & GYNECOLOGY
Payer: COMMERCIAL

## 2025-05-22 ENCOUNTER — ULTRASOUND (OUTPATIENT)
Dept: PERINATAL CARE | Facility: OTHER | Age: 35
End: 2025-05-22
Payer: COMMERCIAL

## 2025-05-22 VITALS
HEIGHT: 66 IN | WEIGHT: 277.4 LBS | DIASTOLIC BLOOD PRESSURE: 60 MMHG | SYSTOLIC BLOOD PRESSURE: 100 MMHG | HEART RATE: 80 BPM | BODY MASS INDEX: 44.58 KG/M2

## 2025-05-22 DIAGNOSIS — Z3A.38 38 WEEKS GESTATION OF PREGNANCY: Primary | ICD-10-CM

## 2025-05-22 DIAGNOSIS — Z3A.38 38 WEEKS GESTATION OF PREGNANCY: ICD-10-CM

## 2025-05-22 DIAGNOSIS — O99.213 OBESITY AFFECTING PREGNANCY IN THIRD TRIMESTER, UNSPECIFIED OBESITY TYPE: ICD-10-CM

## 2025-05-22 DIAGNOSIS — O24.019 TYPE 1 DIABETES MELLITUS AFFECTING PREGNANCY, ANTEPARTUM: ICD-10-CM

## 2025-05-22 PROCEDURE — 59025 FETAL NON-STRESS TEST: CPT

## 2025-05-22 NOTE — PROGRESS NOTES
[facility]: Ms. Ariza was seen today at 38w2d gestational age for NST (found under the pregnancy episode) which I reviewed the RN assessment and agree, and WILLAM (see ultrasound report under Imaging tab).      Yu presents for her weekly antepartum fetal surveillance for the indications of pre-gestational type 1 diabetes and class II obesity.    She endorses positive fetal movement and is overall feeling well today.  Encouraged the patient to keep up-to-date with her glucose flowsheet tracker. Congratulated her on the excellent job she's been doing on her glycemic control throughout her pregnancy.    Nonstress test was reactive without decelerations. Amniotic fluid index is within normal limits.    She will continue to return twice weekly for APFS, which has been scheduled prior to today's visit until her induction of labor scheduled for 2025 at 39 weeks 0 days.    I spent 10 minutes devoted to patient care (3 minutes chart preparation, 4 minutes face to face and 3 minutes documenting).      Antonia FRAIRE

## 2025-05-27 ENCOUNTER — HOSPITAL ENCOUNTER (INPATIENT)
Facility: HOSPITAL | Age: 35
LOS: 4 days | Discharge: HOME | End: 2025-05-31
Attending: STUDENT IN AN ORGANIZED HEALTH CARE EDUCATION/TRAINING PROGRAM | Admitting: STUDENT IN AN ORGANIZED HEALTH CARE EDUCATION/TRAINING PROGRAM
Payer: COMMERCIAL

## 2025-05-27 ENCOUNTER — ANCILLARY PROCEDURE (OUTPATIENT)
Dept: PERINATAL CARE | Facility: OTHER | Age: 35
End: 2025-05-27
Attending: STUDENT IN AN ORGANIZED HEALTH CARE EDUCATION/TRAINING PROGRAM
Payer: COMMERCIAL

## 2025-05-27 ENCOUNTER — ROUTINE PRENATAL (OUTPATIENT)
Age: 35
End: 2025-05-27
Payer: COMMERCIAL

## 2025-05-27 ENCOUNTER — HOSPITAL ENCOUNTER (OUTPATIENT)
Dept: LABOR AND DELIVERY | Facility: HOSPITAL | Age: 35
Discharge: HOME | End: 2025-05-27

## 2025-05-27 ENCOUNTER — ROUTINE PRENATAL (OUTPATIENT)
Dept: PERINATAL CARE | Facility: OTHER | Age: 35
End: 2025-05-27
Payer: COMMERCIAL

## 2025-05-27 VITALS
BODY MASS INDEX: 45.29 KG/M2 | DIASTOLIC BLOOD PRESSURE: 67 MMHG | HEIGHT: 66 IN | SYSTOLIC BLOOD PRESSURE: 113 MMHG | WEIGHT: 281.8 LBS | HEART RATE: 70 BPM

## 2025-05-27 VITALS — DIASTOLIC BLOOD PRESSURE: 88 MMHG | BODY MASS INDEX: 45.35 KG/M2 | SYSTOLIC BLOOD PRESSURE: 136 MMHG | WEIGHT: 281 LBS

## 2025-05-27 DIAGNOSIS — Z3A.39 39 WEEKS GESTATION OF PREGNANCY: Primary | ICD-10-CM

## 2025-05-27 DIAGNOSIS — Z98.891 STATUS POST PRIMARY LOW TRANSVERSE CESAREAN SECTION: ICD-10-CM

## 2025-05-27 DIAGNOSIS — E66.89 OTHER OBESITY AFFECTING PREGNANCY IN THIRD TRIMESTER: ICD-10-CM

## 2025-05-27 DIAGNOSIS — O24.019 TYPE 1 DIABETES MELLITUS AFFECTING PREGNANCY, ANTEPARTUM: Primary | ICD-10-CM

## 2025-05-27 DIAGNOSIS — Z3A.38 38 WEEKS GESTATION OF PREGNANCY: ICD-10-CM

## 2025-05-27 DIAGNOSIS — Z34.03 PRENATAL CARE, FIRST PREGNANCY IN THIRD TRIMESTER: Primary | ICD-10-CM

## 2025-05-27 DIAGNOSIS — Z3A.39 39 WEEKS GESTATION OF PREGNANCY: ICD-10-CM

## 2025-05-27 DIAGNOSIS — E10.9 TYPE 1 DIABETES MELLITUS ON INSULIN THERAPY (HCC): ICD-10-CM

## 2025-05-27 DIAGNOSIS — O99.213 OTHER OBESITY AFFECTING PREGNANCY IN THIRD TRIMESTER: ICD-10-CM

## 2025-05-27 LAB
ABO GROUP BLD: NORMAL
ANISOCYTOSIS BLD QL SMEAR: PRESENT
BASOPHILS # BLD MANUAL: 0 THOUSAND/UL (ref 0–0.1)
BASOPHILS NFR MAR MANUAL: 0 % (ref 0–1)
BLD GP AB SCN SERPL QL: NEGATIVE
EOSINOPHIL # BLD MANUAL: 0.09 THOUSAND/UL (ref 0–0.4)
EOSINOPHIL NFR BLD MANUAL: 1 % (ref 0–6)
ERYTHROCYTE [DISTWIDTH] IN BLOOD BY AUTOMATED COUNT: 12.9 % (ref 11.6–15.1)
GIANT PLATELETS BLD QL SMEAR: PRESENT
GLUCOSE SERPL-MCNC: 57 MG/DL (ref 65–140)
GLUCOSE SERPL-MCNC: 84 MG/DL (ref 65–140)
HCT VFR BLD AUTO: 37 % (ref 34.8–46.1)
HGB BLD-MCNC: 12.9 G/DL (ref 11.5–15.4)
HOLD SPECIMEN: NORMAL
LG PLATELETS BLD QL SMEAR: PRESENT
LYMPHOCYTES # BLD AUTO: 1.94 THOUSAND/UL (ref 0.6–4.47)
LYMPHOCYTES # BLD AUTO: 22 % (ref 14–44)
MCH RBC QN AUTO: 30.1 PG (ref 26.8–34.3)
MCHC RBC AUTO-ENTMCNC: 34.9 G/DL (ref 31.4–37.4)
MCV RBC AUTO: 86 FL (ref 82–98)
MONOCYTES # BLD AUTO: 0.53 THOUSAND/UL (ref 0–1.22)
MONOCYTES NFR BLD: 6 % (ref 4–12)
NEUTROPHILS # BLD MANUAL: 6.26 THOUSAND/UL (ref 1.85–7.62)
NEUTS SEG NFR BLD AUTO: 71 % (ref 43–75)
PLATELET # BLD AUTO: 125 THOUSANDS/UL (ref 149–390)
PLATELET BLD QL SMEAR: ABNORMAL
PMV BLD AUTO: 12.8 FL (ref 8.9–12.7)
RBC # BLD AUTO: 4.28 MILLION/UL (ref 3.81–5.12)
RBC MORPH BLD: PRESENT
RH BLD: POSITIVE
SL AMB  POCT GLUCOSE, UA: ABNORMAL
SL AMB POCT URINE PROTEIN: 1
SPECIMEN EXPIRATION DATE: NORMAL
WBC # BLD AUTO: 8.81 THOUSAND/UL (ref 4.31–10.16)

## 2025-05-27 PROCEDURE — 59025 FETAL NON-STRESS TEST: CPT

## 2025-05-27 PROCEDURE — 76815 OB US LIMITED FETUS(S): CPT

## 2025-05-27 PROCEDURE — NC001 PR NO CHARGE: Performed by: STUDENT IN AN ORGANIZED HEALTH CARE EDUCATION/TRAINING PROGRAM

## 2025-05-27 PROCEDURE — 86780 TREPONEMA PALLIDUM: CPT

## 2025-05-27 PROCEDURE — 86850 RBC ANTIBODY SCREEN: CPT

## 2025-05-27 PROCEDURE — 80053 COMPREHEN METABOLIC PANEL: CPT

## 2025-05-27 PROCEDURE — 99212 OFFICE O/P EST SF 10 MIN: CPT

## 2025-05-27 PROCEDURE — PNV: Performed by: STUDENT IN AN ORGANIZED HEALTH CARE EDUCATION/TRAINING PROGRAM

## 2025-05-27 PROCEDURE — 81002 URINALYSIS NONAUTO W/O SCOPE: CPT | Performed by: STUDENT IN AN ORGANIZED HEALTH CARE EDUCATION/TRAINING PROGRAM

## 2025-05-27 PROCEDURE — 85027 COMPLETE CBC AUTOMATED: CPT

## 2025-05-27 PROCEDURE — 82948 REAGENT STRIP/BLOOD GLUCOSE: CPT

## 2025-05-27 PROCEDURE — 85007 BL SMEAR W/DIFF WBC COUNT: CPT

## 2025-05-27 PROCEDURE — 86900 BLOOD TYPING SEROLOGIC ABO: CPT

## 2025-05-27 PROCEDURE — 86901 BLOOD TYPING SEROLOGIC RH(D): CPT

## 2025-05-27 RX ORDER — ONDANSETRON 2 MG/ML
4 INJECTION INTRAMUSCULAR; INTRAVENOUS EVERY 6 HOURS PRN
Status: DISCONTINUED | OUTPATIENT
Start: 2025-05-27 | End: 2025-05-29

## 2025-05-27 RX ORDER — BUPIVACAINE HYDROCHLORIDE 2.5 MG/ML
30 INJECTION, SOLUTION EPIDURAL; INFILTRATION; INTRACAUDAL; PERINEURAL ONCE AS NEEDED
Status: DISCONTINUED | OUTPATIENT
Start: 2025-05-27 | End: 2025-05-29

## 2025-05-27 RX ORDER — DEXTROSE MONOHYDRATE AND SODIUM CHLORIDE 5; .9 G/100ML; G/100ML
50 INJECTION, SOLUTION INTRAVENOUS CONTINUOUS
Status: DISCONTINUED | OUTPATIENT
Start: 2025-05-27 | End: 2025-05-29

## 2025-05-27 RX ADMIN — DEXTROSE AND SODIUM CHLORIDE 100 ML/HR: 5; .9 INJECTION, SOLUTION INTRAVENOUS at 22:11

## 2025-05-27 NOTE — PROGRESS NOTES
[facility]: Ms. Ariza was seen today at 39w0d gestational age for NST (found under the pregnancy episode) which I reviewed the RN assessment and agree, and WILLAM.  See ultrasound report under Imaging tab.      Yu presents for her weekly antepartum fetal surveillance for the indications of pre-gestational type 1 diabetes and class II obesity.    She endorses positive fetal movement and is overall feeling well today.  The patient's blood sugars continue to be well-controlled.    Nonstress test was reactive without decelerations. Amniotic fluid index is within normal limits.    Her induction of labor is scheduled for tonight at 8 PM at [facility], 25.    I spent 10 minutes devoted to patient care (3 minutes chart preparation, 4 minutes face to face and 3 minutes documenting).      YEE Bee  Nurse Practitioner, Maternal-Fetal Medicine

## 2025-05-27 NOTE — PROGRESS NOTES
Patient is here for prenatal ob visit today.  GA: 39w0d  WHITNEY: 2025  GP:   Blood type: O positive  Denies LOF, VB, CTX  +FM    Urine: Protein +1 / Glucose neg  Labs utd    Blue folder given at PN1  Yellow folder given   Delivery consent on file  Birth plan on file  IOL on file  Breast pump -has  Tdap 2025  GBS neg    No questions or concerns at this time.

## 2025-05-27 NOTE — PROGRESS NOTES
34 y.o.  at 39w0d, here for routine OB visit. Feeling well overall and without concerns. Good FM. Denies LOF, VB, contractions. IOL type 1 diabetes tonight.     Problem List Items Addressed This Visit          Obstetrics/Gynecology    39 weeks gestation of pregnancy    -precautions reviewed  -s/p Tdap  -GBS neg  -cervix closed/posterior and IOL this evening. Recommend walking lots today. Discussed may need cytotec prior to ashraf placement, but would examine on admission  -prepregnancy BMI 38 with goal weight gain 15-25#: TWG = 42#          Other Visit Diagnoses         Prenatal care, first pregnancy in third trimester    -  Primary    Relevant Orders    POCT urine dip (Completed)             Daughter, Margi, called stating Kendra had a catheter put in about 2 weeks ago while she was inpatient. She would like Dr. Weaver or an APC to take a look at it as she think the catheter is tugging from tension. There were a few time where she saw blood/pink tinged urine. This is also causing her pain in the anus. She is nonambulatory and cannot bear wait per Margi. She will have to be seen in [facility] or the .    Detail Level: Zone Mask Type (Optional): hydrating B5 Exfoliation Type: dermaplane Treatment Type (Optional): Sensitive Extraction Method: 11 blade and comedo extractor Facial Steaming: steamed Price (Use Numbers Only, No Special Characters Or $): 75

## 2025-05-27 NOTE — ASSESSMENT & PLAN NOTE
-precautions reviewed  -s/p Tdap  -GBS neg  -cervix closed/posterior and IOL this evening. Recommend walking lots today. Discussed may need cytotec prior to ashraf placement, but would examine on admission  -prepregnancy BMI 38 with goal weight gain 15-25#: TWG = 42#

## 2025-05-27 NOTE — PROGRESS NOTES
34 y.o.  at 39w0d, here for routine OB visit. Feeling well overall and without concerns. Good FM. Denies LOF, VB, contractions. Denies dysuria, hematuria. No problems with activity. Walking 3 times per week for 20 minutes. No questions/concerns.     Problem List Items Addressed This Visit    None  Visit Diagnoses         Prenatal care, first pregnancy in third trimester    -  Primary    Relevant Orders    POCT urine dip (Completed)             Normal for race

## 2025-05-28 ENCOUNTER — ANESTHESIA (INPATIENT)
Dept: LABOR AND DELIVERY | Facility: HOSPITAL | Age: 35
End: 2025-05-28
Payer: COMMERCIAL

## 2025-05-28 ENCOUNTER — ANESTHESIA EVENT (INPATIENT)
Dept: LABOR AND DELIVERY | Facility: HOSPITAL | Age: 35
End: 2025-05-28
Payer: COMMERCIAL

## 2025-05-28 PROBLEM — Z3A.39 39 WEEKS GESTATION OF PREGNANCY: Chronic | Status: ACTIVE | Noted: 2025-04-17

## 2025-05-28 LAB
ALBUMIN SERPL BCG-MCNC: 3.3 G/DL (ref 3.5–5)
ALP SERPL-CCNC: 143 U/L (ref 34–104)
ALT SERPL W P-5'-P-CCNC: 21 U/L (ref 7–52)
ANION GAP SERPL CALCULATED.3IONS-SCNC: 9 MMOL/L (ref 4–13)
AST SERPL W P-5'-P-CCNC: 22 U/L (ref 13–39)
BILIRUB SERPL-MCNC: 0.25 MG/DL (ref 0.2–1)
BUN SERPL-MCNC: 16 MG/DL (ref 5–25)
CALCIUM ALBUM COR SERPL-MCNC: 9.3 MG/DL (ref 8.3–10.1)
CALCIUM SERPL-MCNC: 8.7 MG/DL (ref 8.4–10.2)
CHLORIDE SERPL-SCNC: 106 MMOL/L (ref 96–108)
CO2 SERPL-SCNC: 21 MMOL/L (ref 21–32)
CREAT SERPL-MCNC: 0.55 MG/DL (ref 0.6–1.3)
CREAT UR-MCNC: 105.5 MG/DL
GFR SERPL CREATININE-BSD FRML MDRD: 124 ML/MIN/1.73SQ M
GLUCOSE SERPL-MCNC: 108 MG/DL (ref 65–140)
GLUCOSE SERPL-MCNC: 118 MG/DL (ref 65–140)
GLUCOSE SERPL-MCNC: 71 MG/DL (ref 65–140)
GLUCOSE SERPL-MCNC: 73 MG/DL (ref 65–140)
GLUCOSE SERPL-MCNC: 77 MG/DL (ref 65–140)
GLUCOSE SERPL-MCNC: 78 MG/DL (ref 65–140)
GLUCOSE SERPL-MCNC: 80 MG/DL (ref 65–140)
GLUCOSE SERPL-MCNC: 80 MG/DL (ref 65–140)
GLUCOSE SERPL-MCNC: 82 MG/DL (ref 65–140)
GLUCOSE SERPL-MCNC: 83 MG/DL (ref 65–140)
GLUCOSE SERPL-MCNC: 84 MG/DL (ref 65–140)
GLUCOSE SERPL-MCNC: 85 MG/DL (ref 65–140)
GLUCOSE SERPL-MCNC: 87 MG/DL (ref 65–140)
GLUCOSE SERPL-MCNC: 88 MG/DL (ref 65–140)
GLUCOSE SERPL-MCNC: 88 MG/DL (ref 65–140)
GLUCOSE SERPL-MCNC: 89 MG/DL (ref 65–140)
GLUCOSE SERPL-MCNC: 91 MG/DL (ref 65–140)
GLUCOSE SERPL-MCNC: 91 MG/DL (ref 65–140)
GLUCOSE SERPL-MCNC: 92 MG/DL (ref 65–140)
GLUCOSE SERPL-MCNC: 95 MG/DL (ref 65–140)
GLUCOSE SERPL-MCNC: 95 MG/DL (ref 65–140)
GLUCOSE SERPL-MCNC: 96 MG/DL (ref 65–140)
POTASSIUM SERPL-SCNC: 4 MMOL/L (ref 3.5–5.3)
PROT SERPL-MCNC: 6.4 G/DL (ref 6.4–8.4)
PROT UR-MCNC: 49.9 MG/DL
PROT/CREAT UR: 0.5 MG/G{CREAT}
SODIUM SERPL-SCNC: 136 MMOL/L (ref 135–147)
TREPONEMA PALLIDUM IGG+IGM AB [PRESENCE] IN SERUM OR PLASMA BY IMMUNOASSAY: NORMAL

## 2025-05-28 PROCEDURE — 4A1HXCZ MONITORING OF PRODUCTS OF CONCEPTION, CARDIAC RATE, EXTERNAL APPROACH: ICD-10-PCS | Performed by: OBSTETRICS & GYNECOLOGY

## 2025-05-28 PROCEDURE — 84156 ASSAY OF PROTEIN URINE: CPT

## 2025-05-28 PROCEDURE — 82948 REAGENT STRIP/BLOOD GLUCOSE: CPT

## 2025-05-28 PROCEDURE — 3E0P7VZ INTRODUCTION OF HORMONE INTO FEMALE REPRODUCTIVE, VIA NATURAL OR ARTIFICIAL OPENING: ICD-10-PCS | Performed by: OBSTETRICS & GYNECOLOGY

## 2025-05-28 PROCEDURE — 3E033VJ INTRODUCTION OF OTHER HORMONE INTO PERIPHERAL VEIN, PERCUTANEOUS APPROACH: ICD-10-PCS | Performed by: OBSTETRICS & GYNECOLOGY

## 2025-05-28 PROCEDURE — 82570 ASSAY OF URINE CREATININE: CPT

## 2025-05-28 RX ORDER — METOCLOPRAMIDE HYDROCHLORIDE 5 MG/ML
10 INJECTION INTRAMUSCULAR; INTRAVENOUS ONCE AS NEEDED
Status: DISCONTINUED | OUTPATIENT
Start: 2025-05-28 | End: 2025-05-28

## 2025-05-28 RX ORDER — ONDANSETRON 2 MG/ML
4 INJECTION INTRAMUSCULAR; INTRAVENOUS ONCE AS NEEDED
Status: DISCONTINUED | OUTPATIENT
Start: 2025-05-28 | End: 2025-05-28

## 2025-05-28 RX ORDER — LIDOCAINE HYDROCHLORIDE AND EPINEPHRINE 15; 5 MG/ML; UG/ML
INJECTION, SOLUTION EPIDURAL
Status: COMPLETED | OUTPATIENT
Start: 2025-05-28 | End: 2025-05-28

## 2025-05-28 RX ORDER — OXYTOCIN/0.9 % SODIUM CHLORIDE 30/500 ML
1-30 PLASTIC BAG, INJECTION (ML) INTRAVENOUS
Status: DISCONTINUED | OUTPATIENT
Start: 2025-05-28 | End: 2025-05-29

## 2025-05-28 RX ORDER — FENTANYL CITRATE/PF 50 MCG/ML
25 SYRINGE (ML) INJECTION
Status: DISCONTINUED | OUTPATIENT
Start: 2025-05-28 | End: 2025-05-28

## 2025-05-28 RX ORDER — SODIUM CHLORIDE, SODIUM LACTATE, POTASSIUM CHLORIDE, CALCIUM CHLORIDE 600; 310; 30; 20 MG/100ML; MG/100ML; MG/100ML; MG/100ML
75 INJECTION, SOLUTION INTRAVENOUS CONTINUOUS
Status: DISCONTINUED | OUTPATIENT
Start: 2025-05-28 | End: 2025-05-29

## 2025-05-28 RX ADMIN — DEXTROSE AND SODIUM CHLORIDE 50 ML/HR: 5; .9 INJECTION, SOLUTION INTRAVENOUS at 19:27

## 2025-05-28 RX ADMIN — Medication 25 MCG: at 05:12

## 2025-05-28 RX ADMIN — DEXTROSE AND SODIUM CHLORIDE 100 ML/HR: 5; .9 INJECTION, SOLUTION INTRAVENOUS at 07:32

## 2025-05-28 RX ADMIN — ROPIVACAINE HYDROCHLORIDE: 2 INJECTION, SOLUTION EPIDURAL; INFILTRATION at 21:35

## 2025-05-28 RX ADMIN — SODIUM CHLORIDE, SODIUM LACTATE, POTASSIUM CHLORIDE, AND CALCIUM CHLORIDE 75 ML/HR: .6; .31; .03; .02 INJECTION, SOLUTION INTRAVENOUS at 18:37

## 2025-05-28 RX ADMIN — Medication 25 MCG: at 00:55

## 2025-05-28 RX ADMIN — Medication 2 MILLI-UNITS/MIN: at 13:32

## 2025-05-28 RX ADMIN — SODIUM CHLORIDE, SODIUM LACTATE, POTASSIUM CHLORIDE, AND CALCIUM CHLORIDE 75 ML/HR: .6; .31; .03; .02 INJECTION, SOLUTION INTRAVENOUS at 13:23

## 2025-05-28 RX ADMIN — LIDOCAINE HYDROCHLORIDE AND EPINEPHRINE 5 ML: 15; 5 INJECTION, SOLUTION EPIDURAL at 15:34

## 2025-05-28 RX ADMIN — SODIUM CHLORIDE 0.5 UNITS/HR: 9 INJECTION, SOLUTION INTRAVENOUS at 02:00

## 2025-05-28 RX ADMIN — ROPIVACAINE HYDROCHLORIDE: 2 INJECTION, SOLUTION EPIDURAL; INFILTRATION at 15:36

## 2025-05-28 NOTE — PLAN OF CARE
Problem: PAIN - ADULT  Goal: Verbalizes/displays adequate comfort level or baseline comfort level  Description: Interventions:  - Encourage patient to monitor pain and request assistance  - Assess pain using appropriate pain scale  - Administer analgesics as ordered based on type and severity of pain and evaluate response  - Implement non-pharmacological measures as appropriate and evaluate response  - Consider cultural and social influences on pain and pain management  - Notify physician/advanced practitioner if interventions unsuccessful or patient reports new pain  - Educate patient/family on pain management process including their role and importance of  reporting pain   - Provide non-pharmacologic/complimentary pain relief interventions  Outcome: Progressing     Problem: INFECTION - ADULT  Goal: Absence or prevention of progression during hospitalization  Description: INTERVENTIONS:  - Assess and monitor for signs and symptoms of infection  - Monitor lab/diagnostic results  - Monitor all insertion sites, i.e. indwelling lines, tubes, and drains  - Monitor endotracheal if appropriate and nasal secretions for changes in amount and color  - [place] appropriate cooling/warming therapies per order  - Administer medications as ordered  - Instruct and encourage patient and family to use good hand hygiene technique  - Identify and instruct in appropriate isolation precautions for identified infection/condition  Outcome: Progressing  Goal: Absence of fever/infection during neutropenic period  Description: INTERVENTIONS:  - Monitor WBC  - Perform strict hand hygiene  - Limit to healthy visitors only  - No plants, dried, fresh or silk flowers with velasquez in patient room  Outcome: Progressing     Problem: SAFETY ADULT  Goal: Patient will remain free of falls  Description: INTERVENTIONS:  - Educate patient/family on patient safety including physical limitations  - Instruct patient to call for assistance with activity   -  Consider consulting OT/PT to assist with strengthening/mobility based on AM PAC & JH-HLM score  - Consult OT/PT to assist with strengthening/mobility   - Keep Call bell within reach  - Keep bed low and locked with side rails adjusted as appropriate  - Keep care items and personal belongings within reach  - Initiate and maintain comfort rounds  - Make Fall Risk Sign visible to staff  - Apply yellow socks and bracelet for high fall risk patients  - Consider moving patient to room near nurses station  Outcome: Progressing  Goal: Maintain or return to baseline ADL function  Description: INTERVENTIONS:  -  Assess patient's ability to carry out ADLs; assess patient's baseline for ADL function and identify physical deficits which impact ability to perform ADLs (bathing, care of mouth/teeth, toileting, grooming, dressing, etc.)  - Assess/evaluate cause of self-care deficits   - Assess range of motion  - Assess patient's mobility; develop plan if impaired  - Assess patient's need for assistive devices and provide as appropriate  - Encourage maximum independence but intervene and supervise when necessary  - Involve family in performance of ADLs  - Assess for home care needs following discharge   - Consider OT consult to assist with ADL evaluation and planning for discharge  - Provide patient education as appropriate  - Monitor functional capacity and physical performance, use of AM PAC & JH-HLM   - Monitor gait, balance and fatigue with ambulation    Outcome: Progressing  Goal: Maintains/Returns to pre admission functional level  Description: INTERVENTIONS:  - Perform AM-PAC 6 Click Basic Mobility/ Daily Activity assessment daily.  - Set and communicate daily mobility goal to care team and patient/family/caregiver.   - Record patient progress and toleration of activity level   Outcome: Progressing     Problem: DISCHARGE PLANNING  Goal: Discharge to home or other facility with appropriate resources  Description:  INTERVENTIONS:  - Identify barriers to discharge w/patient and caregiver  - Arrange for needed discharge resources and transportation as appropriate  - Identify discharge learning needs (meds, wound care, etc.)  - Arrange for interpretive services to assist at discharge as needed  - Refer to Case Management Department for coordinating discharge planning if the patient needs post-hospital services based on physician/advanced practitioner order or complex needs related to functional status, cognitive ability, or social support system  Outcome: Progressing     Problem: Knowledge Deficit  Goal: Patient/family/caregiver demonstrates understanding of disease process, treatment plan, medications, and discharge instructions  Description: Complete learning assessment and assess knowledge base.  Interventions:  - Provide teaching at level of understanding  - Provide teaching via preferred learning methods  Outcome: Progressing  Goal: Verbalizes understanding of labor plan  Description: Assess patient/family/caregiver's baseline knowledge level and ability to understand information.  Provide education via patient/family/caregiver's preferred learning method at appropriate level of understanding.     1. Provide teaching at level of understanding.  2. Provide teaching via preferred learning method(s).  Outcome: Progressing     Problem: Labor & Delivery  Goal: Manages discomfort  Description: Assess and monitor for signs and symptoms of discomfort.  Assess patient's pain level regularly and per hospital policy.  Administer medications as ordered. Support use of nonpharmacological methods to help control pain such as distraction, imagery, relaxation, and application of heat and cold.  Collaborate with interdisciplinary team and patient to determine appropriate pain management plan.    1. Include patient in decisions related to comfort.  2. Offer non-pharmacological pain management interventions.  3. Report ineffective pain  management to physician.  Outcome: Progressing  Goal: Patient vital signs are stable  Description: 1. Assess vital signs - vaginal delivery.  Outcome: Progressing     Problem: BIRTH - VAGINAL/ SECTION  Goal: Fetal and maternal status remain reassuring during the birth process  Description: INTERVENTIONS:  - Monitor vital signs  - Monitor fetal heart rate  - Monitor uterine activity  - Monitor labor progression (vaginal delivery)  - DVT prophylaxis  - Antibiotic prophylaxis  Outcome: Progressing  Goal: Emotionally satisfying birthing experience for mother/fetus  Description: Interventions:  - Assess, plan, implement and evaluate the nursing care given to the patient in labor  - Advocate the philosophy that each childbirth experience is a unique experience and support the family's chosen level of involvement and control during the labor process   - Actively participate in both the patient's and family's teaching of the birth process  - Consider cultural, [place] and age-specific factors and plan care for the patient in labor  Outcome: Progressing

## 2025-05-28 NOTE — OB LABOR/OXYTOCIN SAFETY PROGRESS
Labor Progress Note - Yu Ariza 34 y.o. female MRN: [id number]    Unit/Bed#: -01 Encounter: [id number]       Contraction Frequency (minutes): x2  Contraction Intensity: Mild  Uterine Activity Characteristics: Irritability  Cervical Dilation: Closed        Cervical Effacement: 0  Fetal Station: Floating  Baseline Rate (FHR): 125 bpm  Fetal Heart Rate (FHT): 145 BPM  FHR Category: I             Vital Signs:   Vitals:    25 0100   BP: 140/85   Pulse: 75   Resp: 16   Temp: 97.8 °F (36.6 °C)   SpO2:      Notes/comments:   Pt resting comfortably. FHT Category I. Nina irritable. SVE as above. Vaginal Cytotec 25mcg placed. Plan to place [place] balloon at next check. Anticipate .    Pippa Garcia MD 2025 1:04 AM

## 2025-05-28 NOTE — PLAN OF CARE
Problem: PAIN - ADULT  Goal: Verbalizes/displays adequate comfort level or baseline comfort level  Description: Interventions:  - Encourage patient to monitor pain and request assistance  - Assess pain using appropriate pain scale  - Administer analgesics as ordered based on type and severity of pain and evaluate response  - Implement non-pharmacological measures as appropriate and evaluate response  - Consider cultural and social influences on pain and pain management  - Notify physician/advanced practitioner if interventions unsuccessful or patient reports new pain  - Educate patient/family on pain management process including their role and importance of  reporting pain   - Provide non-pharmacologic/complimentary pain relief interventions  Outcome: Progressing     Problem: INFECTION - ADULT  Goal: Absence or prevention of progression during hospitalization  Description: INTERVENTIONS:  - Assess and monitor for signs and symptoms of infection  - Monitor lab/diagnostic results  - Monitor all insertion sites, i.e. indwelling lines, tubes, and drains  - Monitor endotracheal if appropriate and nasal secretions for changes in amount and color  - [place] appropriate cooling/warming therapies per order  - Administer medications as ordered  - Instruct and encourage patient and family to use good hand hygiene technique  - Identify and instruct in appropriate isolation precautions for identified infection/condition  Outcome: Progressing  Goal: Absence of fever/infection during neutropenic period  Description: INTERVENTIONS:  - Monitor WBC  - Perform strict hand hygiene  - Limit to healthy visitors only  - No plants, dried, fresh or silk flowers with velasquez in patient room  Outcome: Progressing     Problem: SAFETY ADULT  Goal: Patient will remain free of falls  Description: INTERVENTIONS:  - Educate patient/family on patient safety including physical limitations  - Instruct patient to call for assistance with activity   -  Consider consulting OT/PT to assist with strengthening/mobility based on AM PAC & JH-HLM score  - Consult OT/PT to assist with strengthening/mobility   - Keep Call bell within reach  - Keep bed low and locked with side rails adjusted as appropriate  - Keep care items and personal belongings within reach  - Initiate and maintain comfort rounds  - Make Fall Risk Sign visible to staff  - Apply yellow socks and bracelet for high fall risk patients  - Consider moving patient to room near nurses station  Outcome: Progressing  Goal: Maintain or return to baseline ADL function  Description: INTERVENTIONS:  -  Assess patient's ability to carry out ADLs; assess patient's baseline for ADL function and identify physical deficits which impact ability to perform ADLs (bathing, care of mouth/teeth, toileting, grooming, dressing, etc.)  - Assess/evaluate cause of self-care deficits   - Assess range of motion  - Assess patient's mobility; develop plan if impaired  - Assess patient's need for assistive devices and provide as appropriate  - Encourage maximum independence but intervene and supervise when necessary  - Involve family in performance of ADLs  - Assess for home care needs following discharge   - Consider OT consult to assist with ADL evaluation and planning for discharge  - Provide patient education as appropriate  - Monitor functional capacity and physical performance, use of AM PAC & JH-HLM   - Monitor gait, balance and fatigue with ambulation    Outcome: Progressing  Goal: Maintains/Returns to pre admission functional level  Description: INTERVENTIONS:  - Perform AM-PAC 6 Click Basic Mobility/ Daily Activity assessment daily.  - Set and communicate daily mobility goal to care team and patient/family/caregiver.   - Record patient progress and toleration of activity level   Outcome: Progressing     Problem: DISCHARGE PLANNING  Goal: Discharge to home or other facility with appropriate resources  Description:  INTERVENTIONS:  - Identify barriers to discharge w/patient and caregiver  - Arrange for needed discharge resources and transportation as appropriate  - Identify discharge learning needs (meds, wound care, etc.)  - Arrange for interpretive services to assist at discharge as needed  - Refer to Case Management Department for coordinating discharge planning if the patient needs post-hospital services based on physician/advanced practitioner order or complex needs related to functional status, cognitive ability, or social support system  Outcome: Progressing     Problem: Knowledge Deficit  Goal: Patient/family/caregiver demonstrates understanding of disease process, treatment plan, medications, and discharge instructions  Description: Complete learning assessment and assess knowledge base.  Interventions:  - Provide teaching at level of understanding  - Provide teaching via preferred learning methods  Outcome: Progressing  Goal: Verbalizes understanding of labor plan  Description: Assess patient/family/caregiver's baseline knowledge level and ability to understand information.  Provide education via patient/family/caregiver's preferred learning method at appropriate level of understanding.     1. Provide teaching at level of understanding.  2. Provide teaching via preferred learning method(s).  Outcome: Progressing     Problem: Labor & Delivery  Goal: Manages discomfort  Description: Assess and monitor for signs and symptoms of discomfort.  Assess patient's pain level regularly and per hospital policy.  Administer medications as ordered. Support use of nonpharmacological methods to help control pain such as distraction, imagery, relaxation, and application of heat and cold.  Collaborate with interdisciplinary team and patient to determine appropriate pain management plan.    1. Include patient in decisions related to comfort.  2. Offer non-pharmacological pain management interventions.  3. Report ineffective pain  management to physician.  Outcome: Progressing  Goal: Patient vital signs are stable  Description: 1. Assess vital signs - vaginal delivery.  Outcome: Progressing     Problem: BIRTH - VAGINAL/ SECTION  Goal: Fetal and maternal status remain reassuring during the birth process  Description: INTERVENTIONS:  - Monitor vital signs  - Monitor fetal heart rate  - Monitor uterine activity  - Monitor labor progression (vaginal delivery)  - DVT prophylaxis  - Antibiotic prophylaxis  Outcome: Progressing  Goal: Emotionally satisfying birthing experience for mother/fetus  Description: Interventions:  - Assess, plan, implement and evaluate the nursing care given to the patient in labor  - Advocate the philosophy that each childbirth experience is a unique experience and support the family's chosen level of involvement and control during the labor process   - Actively participate in both the patient's and family's teaching of the birth process  - Consider cultural, [place] and age-specific factors and plan care for the patient in labor  Outcome: Progressing

## 2025-05-28 NOTE — ASSESSMENT & PLAN NOTE
QBL: 551cc; admission Hb 12.9 >> 11.3  - Continue routine postoperative care  - Pain management with oral analgesics  - DVT Ppx: Lovenox 40mg BID; encourage ambulation  - Passed void trial  - Encourage breastfeeding, familial bonding

## 2025-05-28 NOTE — ANESTHESIA PROCEDURE NOTES
Addended by: Nataliya Dahl on: 8/20/2021 10:45 AM     Modules accepted: Orders Epidural Block    Patient location during procedure: OB/L&D  Start time: 5/28/2025 3:15 PM  Reason for block: procedure for pain  Staffing  Performed by: Norma Beverly CRNA  Authorized by: Santo Oliveira MD    Preanesthetic Checklist  Completed: patient identified, IV checked, site marked, risks and benefits discussed, surgical consent, monitors and equipment checked, pre-op evaluation and timeout performed  Epidural  Patient position: sitting  Prep: ChloraPrep  Sedation Level: no sedation  Patient monitoring: continuous pulse oximetry, frequent blood pressure checks and heart rate  Approach: midline  Location: lumbar, L3-4  Injection technique: DEBBIE saline  Needle  Needle type: Tuohy   Needle gauge: 17 G  Needle insertion depth: 6 cm  Catheter type: spring wound  Catheter size: 19 G  Catheter at skin depth: 12 cm  Catheter securement method: clear occlusive dressing  Test dose: negativelidocaine-epinephrine (XYLOCAINE-MPF/EPINEPHRINE) 1.5 %-1:200,000 injection 3 mL - Epidural   5 mL - 5/28/2025 3:34:00 PM  Assessment  Sensory level: T10  Number of attempts: 1negative aspiration for CSF, negative aspiration for heme and no paresthesia on injection  patient tolerated the procedure well with no immediate complications

## 2025-05-28 NOTE — ASSESSMENT & PLAN NOTE
MFM US 4/15/25:    Intramural myometrium fibroid located in the right anterior corpus region, measuring 3.3 x 1.2 x 2.0cm with a volume of 4.0cc. Color doppler flow was not increased. The appearance was hypoechoic.

## 2025-05-28 NOTE — PLAN OF CARE
Problem: PAIN - ADULT  Goal: Verbalizes/displays adequate comfort level or baseline comfort level  Description: Interventions:  - Encourage patient to monitor pain and request assistance  - Assess pain using appropriate pain scale  - Administer analgesics as ordered based on type and severity of pain and evaluate response  - Implement non-pharmacological measures as appropriate and evaluate response  - Consider cultural and social influences on pain and pain management  - Notify physician/advanced practitioner if interventions unsuccessful or patient reports new pain  - Educate patient/family on pain management process including their role and importance of  reporting pain   - Provide non-pharmacologic/complimentary pain relief interventions  Outcome: Progressing     Problem: INFECTION - ADULT  Goal: Absence or prevention of progression during hospitalization  Description: INTERVENTIONS:  - Assess and monitor for signs and symptoms of infection  - Monitor lab/diagnostic results  - Monitor all insertion sites, i.e. indwelling lines, tubes, and drains  - Monitor endotracheal if appropriate and nasal secretions for changes in amount and color  - [place] appropriate cooling/warming therapies per order  - Administer medications as ordered  - Instruct and encourage patient and family to use good hand hygiene technique  - Identify and instruct in appropriate isolation precautions for identified infection/condition  Outcome: Progressing  Goal: Absence of fever/infection during neutropenic period  Description: INTERVENTIONS:  - Monitor WBC  - Perform strict hand hygiene  - Limit to healthy visitors only  - No plants, dried, fresh or silk flowers with velasquez in patient room  Outcome: Progressing     Problem: SAFETY ADULT  Goal: Patient will remain free of falls  Description: INTERVENTIONS:  - Educate patient/family on patient safety including physical limitations  - Instruct patient to call for assistance with activity   -  Consider consulting OT/PT to assist with strengthening/mobility based on AM PAC & JH-HLM score  - Consult OT/PT to assist with strengthening/mobility   - Keep Call bell within reach  - Keep bed low and locked with side rails adjusted as appropriate  - Keep care items and personal belongings within reach  - Initiate and maintain comfort rounds  - Make Fall Risk Sign visible to staff  - Apply yellow socks and bracelet for high fall risk patients  - Consider moving patient to room near nurses station  Outcome: Progressing  Goal: Maintain or return to baseline ADL function  Description: INTERVENTIONS:  -  Assess patient's ability to carry out ADLs; assess patient's baseline for ADL function and identify physical deficits which impact ability to perform ADLs (bathing, care of mouth/teeth, toileting, grooming, dressing, etc.)  - Assess/evaluate cause of self-care deficits   - Assess range of motion  - Assess patient's mobility; develop plan if impaired  - Assess patient's need for assistive devices and provide as appropriate  - Encourage maximum independence but intervene and supervise when necessary  - Involve family in performance of ADLs  - Assess for home care needs following discharge   - Consider OT consult to assist with ADL evaluation and planning for discharge  - Provide patient education as appropriate  - Monitor functional capacity and physical performance, use of AM PAC & JH-HLM   - Monitor gait, balance and fatigue with ambulation    Outcome: Progressing  Goal: Maintains/Returns to pre admission functional level  Description: INTERVENTIONS:  - Perform AM-PAC 6 Click Basic Mobility/ Daily Activity assessment daily.  - Set and communicate daily mobility goal to care team and patient/family/caregiver.   - Collaborate with rehabilitation services on mobility goals if consulted  - Out of bed for toileting  - Record patient progress and toleration of activity level   Outcome: Progressing     Problem: DISCHARGE  PLANNING  Goal: Discharge to home or other facility with appropriate resources  Description: INTERVENTIONS:  - Identify barriers to discharge w/patient and caregiver  - Arrange for needed discharge resources and transportation as appropriate  - Identify discharge learning needs (meds, wound care, etc.)  - Arrange for interpretive services to assist at discharge as needed  - Refer to Case Management Department for coordinating discharge planning if the patient needs post-hospital services based on physician/advanced practitioner order or complex needs related to functional status, cognitive ability, or social support system  Outcome: Progressing     Problem: Knowledge Deficit  Goal: Patient/family/caregiver demonstrates understanding of disease process, treatment plan, medications, and discharge instructions  Description: Complete learning assessment and assess knowledge base.  Interventions:  - Provide teaching at level of understanding  - Provide teaching via preferred learning methods  Outcome: Progressing  Goal: Verbalizes understanding of labor plan  Description: Assess patient/family/caregiver's baseline knowledge level and ability to understand information.  Provide education via patient/family/caregiver's preferred learning method at appropriate level of understanding.     1. Provide teaching at level of understanding.  2. Provide teaching via preferred learning method(s).  Outcome: Progressing     Problem: Labor & Delivery  Goal: Manages discomfort  Description: Assess and monitor for signs and symptoms of discomfort.  Assess patient's pain level regularly and per hospital policy.  Administer medications as ordered. Support use of nonpharmacological methods to help control pain such as distraction, imagery, relaxation, and application of heat and cold.  Collaborate with interdisciplinary team and patient to determine appropriate pain management plan.    1. Include patient in decisions related to comfort.  2.  Offer non-pharmacological pain management interventions.  3. Report ineffective pain management to physician.  Outcome: Progressing  Goal: Patient vital signs are stable  Description: 1. Assess vital signs - vaginal delivery.  Outcome: Progressing     Problem: BIRTH - VAGINAL/ SECTION  Goal: Fetal and maternal status remain reassuring during the birth process  Description: INTERVENTIONS:  - Monitor vital signs  - Monitor fetal heart rate  - Monitor uterine activity  - Monitor labor progression (vaginal delivery)  - DVT prophylaxis  - Antibiotic prophylaxis  Outcome: Progressing  Goal: Emotionally satisfying birthing experience for mother/fetus  Description: Interventions:  - Assess, plan, implement and evaluate the nursing care given to the patient in labor  - Advocate the philosophy that each childbirth experience is a unique experience and support the family's chosen level of involvement and control during the labor process   - Actively participate in both the patient's and family's teaching of the birth process  - Consider cultural, Anabaptist and age-specific factors and plan care for the patient in labor  Outcome: Progressing

## 2025-05-28 NOTE — OB LABOR/OXYTOCIN SAFETY PROGRESS
Oxytocin Safety Progress Check Note - Yu Ariza 34 y.o. female MRN: [id number]    Unit/Bed#: -01 Encounter: [id number]    Dose (anshul-units/min) Oxytocin: 8 anshul-units/min  Contraction Frequency (minutes): 1.5-2  Contraction Intensity: Moderate  Uterine Activity Characteristics: Regular  Cervical Dilation: 3-4        Cervical Effacement: 80  Fetal Station: -2  Baseline Rate (FHR): 140 bpm  Fetal Heart Rate (FHT): 147 BPM  FHR Category: II  Oxytocin Safety Progress Check: Safety check completed          Vital Signs:   Vitals:    25 1630   BP: 126/72   Pulse: 73   Resp:    Temp:    SpO2:      Notes/comments:   Pt resting comfortably with epidural. FHT Category II, intermittent variable decelerations resolved with maternal repositioning. [place] with contractions q1-2min. SVE as above. SROM for blood-tinged fluid noted on exam. Pit running at 8, will continue to titrate to contractions. Will recheck in 2 hours or sooner if clinically indicated. Anticipate . Attending physician Dr. Sullivan aware.     Pippa Garcia MD 2025 5:46 PM

## 2025-05-28 NOTE — PLAN OF CARE
Problem: PAIN - ADULT  Goal: Verbalizes/displays adequate comfort level or baseline comfort level  Description: Interventions:  - Encourage patient to monitor pain and request assistance  - Assess pain using appropriate pain scale  - Administer analgesics as ordered based on type and severity of pain and evaluate response  - Implement non-pharmacological measures as appropriate and evaluate response  - Consider cultural and social influences on pain and pain management  - Notify physician/advanced practitioner if interventions unsuccessful or patient reports new pain  - Educate patient/family on pain management process including their role and importance of  reporting pain   - Provide non-pharmacologic/complimentary pain relief interventions  Outcome: Progressing     Problem: INFECTION - ADULT  Goal: Absence or prevention of progression during hospitalization  Description: INTERVENTIONS:  - Assess and monitor for signs and symptoms of infection  - Monitor lab/diagnostic results  - Monitor all insertion sites, i.e. indwelling lines, tubes, and drains  - Monitor endotracheal if appropriate and nasal secretions for changes in amount and color  - [place] appropriate cooling/warming therapies per order  - Administer medications as ordered  - Instruct and encourage patient and family to use good hand hygiene technique  - Identify and instruct in appropriate isolation precautions for identified infection/condition  Outcome: Progressing  Goal: Absence of fever/infection during neutropenic period  Description: INTERVENTIONS:  - Monitor WBC  - Perform strict hand hygiene  - Limit to healthy visitors only  - No plants, dried, fresh or silk flowers with velasquez in patient room  Outcome: Progressing     Problem: SAFETY ADULT  Goal: Patient will remain free of falls  Description: INTERVENTIONS:  - Educate patient/family on patient safety including physical limitations  - Instruct patient to call for assistance with activity   -  Consider consulting OT/PT to assist with strengthening/mobility based on AM PAC & JH-HLM score  - Consult OT/PT to assist with strengthening/mobility   - Keep Call bell within reach  - Keep bed low and locked with side rails adjusted as appropriate  - Keep care items and personal belongings within reach  - Initiate and maintain comfort rounds  - Make Fall Risk Sign visible to staff  - Apply yellow socks and bracelet for high fall risk patients  - Consider moving patient to room near nurses station  Outcome: Progressing  Goal: Maintain or return to baseline ADL function  Description: INTERVENTIONS:  -  Assess patient's ability to carry out ADLs; assess patient's baseline for ADL function and identify physical deficits which impact ability to perform ADLs (bathing, care of mouth/teeth, toileting, grooming, dressing, etc.)  - Assess/evaluate cause of self-care deficits   - Assess range of motion  - Assess patient's mobility; develop plan if impaired  - Assess patient's need for assistive devices and provide as appropriate  - Encourage maximum independence but intervene and supervise when necessary  - Involve family in performance of ADLs  - Assess for home care needs following discharge   - Consider OT consult to assist with ADL evaluation and planning for discharge  - Provide patient education as appropriate  - Monitor functional capacity and physical performance, use of AM PAC & JH-HLM   - Monitor gait, balance and fatigue with ambulation    Outcome: Progressing  Goal: Maintains/Returns to pre admission functional level  Description: INTERVENTIONS:  - Perform AM-PAC 6 Click Basic Mobility/ Daily Activity assessment daily.  - Set and communicate daily mobility goal to care team and patient/family/caregiver.   - Record patient progress and toleration of activity level   Outcome: Progressing     Problem: DISCHARGE PLANNING  Goal: Discharge to home or other facility with appropriate resources  Description:  INTERVENTIONS:  - Identify barriers to discharge w/patient and caregiver  - Arrange for needed discharge resources and transportation as appropriate  - Identify discharge learning needs (meds, wound care, etc.)  - Arrange for interpretive services to assist at discharge as needed  - Refer to Case Management Department for coordinating discharge planning if the patient needs post-hospital services based on physician/advanced practitioner order or complex needs related to functional status, cognitive ability, or social support system  Outcome: Progressing     Problem: Knowledge Deficit  Goal: Patient/family/caregiver demonstrates understanding of disease process, treatment plan, medications, and discharge instructions  Description: Complete learning assessment and assess knowledge base.  Interventions:  - Provide teaching at level of understanding  - Provide teaching via preferred learning methods  Outcome: Progressing  Goal: Verbalizes understanding of labor plan  Description: Assess patient/family/caregiver's baseline knowledge level and ability to understand information.  Provide education via patient/family/caregiver's preferred learning method at appropriate level of understanding.     1. Provide teaching at level of understanding.  2. Provide teaching via preferred learning method(s).  Outcome: Progressing     Problem: Labor & Delivery  Goal: Manages discomfort  Description: Assess and monitor for signs and symptoms of discomfort.  Assess patient's pain level regularly and per hospital policy.  Administer medications as ordered. Support use of nonpharmacological methods to help control pain such as distraction, imagery, relaxation, and application of heat and cold.  Collaborate with interdisciplinary team and patient to determine appropriate pain management plan.    1. Include patient in decisions related to comfort.  2. Offer non-pharmacological pain management interventions.  3. Report ineffective pain  management to physician.  Outcome: Progressing  Goal: Patient vital signs are stable  Description: 1. Assess vital signs - vaginal delivery.  Outcome: Progressing     Problem: BIRTH - VAGINAL/ SECTION  Goal: Fetal and maternal status remain reassuring during the birth process  Description: INTERVENTIONS:  - Monitor vital signs  - Monitor fetal heart rate  - Monitor uterine activity  - Monitor labor progression (vaginal delivery)  - DVT prophylaxis  - Antibiotic prophylaxis  Outcome: Progressing  Goal: Emotionally satisfying birthing experience for mother/fetus  Description: Interventions:  - Assess, plan, implement and evaluate the nursing care given to the patient in labor  - Advocate the philosophy that each childbirth experience is a unique experience and support the family's chosen level of involvement and control during the labor process   - Actively participate in both the patient's and family's teaching of the birth process  - Consider cultural, Jew and age-specific factors and plan care for the patient in labor  Outcome: Progressing

## 2025-05-28 NOTE — OB LABOR/OXYTOCIN SAFETY PROGRESS
Labor Progress Note - Yu Ariza 34 y.o. female MRN: [id number]    Unit/Bed#: -01 Encounter: [id number]       Contraction Frequency (minutes): 2-4  Contraction Intensity: Mild  Uterine Activity Characteristics: Irregular  Cervical Dilation: Closed        Cervical Effacement: 0  Fetal Station: Floating  Baseline Rate (FHR): 130 bpm  Fetal Heart Rate (FHT): 135 BPM  FHR Category: I             Vital Signs:   Vitals:    25 0445   BP: 133/79   Pulse: 68   Resp: 16   Temp:    SpO2:      Notes/comments:   Pt resting comfortably. FHT Category I. [place] irritable. SVE as above, unchanged from prior. [place] balloon placement attempted under speculum guidance; catheter unable to pass through internal cervical os. Vaginal Cytotec 25mcg dose #2 placed. Will re-attempt [place] balloon placement on day shift. Anticipate . Attending physician Dr. Alejandra Unger aware.     Pippa Garcia MD 2025 5:27 AM

## 2025-05-28 NOTE — ASSESSMENT & PLAN NOTE
Lab Results   Component Value Date    HGBA1C 6.0 (H) 03/26/2025       Recent Labs     05/28/25  1006 05/28/25  1102 05/28/25  1158 05/28/25  1301   POCGLU 85 83 92 96       Blood Sugar Average: Last 72 hrs:  (P) [id number]

## 2025-05-28 NOTE — H&P
H & P- Obstetrics   Yu Ariza 34 y.o. female MRN: [id number]  Unit/Bed#: -01 Encounter: [id number]    Assessment: 34 y.o.  at 39w0d admitted for IOL in the setting of T1DM.  SVE: 0/0/-5  FHT: reactive  [place]: irritable    EFW: 80%ile @37w2d  Presentation: cephalic, confirmed by US on 25  GBS status: neg    Plan:   * 39 weeks gestation of pregnancy  Assessment & Plan  Patient presenting for induction of labor. SVE 0/0/-5, toco irritable, FHT reactive  - admit to labor & delivery floor  - IV fluids, clear liquid diabetic diet  - labs: CBC, T/S, RPR  - analgesia at maternal request  - management: [place] balloon, Cytotec, pitocin titration, anticipate     Uterine fibroid in pregnancy  Assessment & Plan  MFM US 4/15/25:    Intramural myometrium fibroid located in the right anterior corpus region, measuring 3.3 x 1.2 x 2.0cm with a volume of 4.0cc. Color doppler flow was not increased. The appearance was hypoechoic.    Type 1 diabetes mellitus on insulin therapy (HCC)  Assessment & Plan  Lab Results   Component Value Date    HGBA1C 6.0 (H) 2025     Recent Labs     25  2159 25  2314 25  0039   POCGLU 57* 84 88     Blood Sugar Average: Last 72 hrs:  (P) [id number]    - insulin gtt during induction and labor        Discussed case and plan w/ Dr. Alejandra Unger    Chief Concern: IOL    HPI: Yu Ariza is a 34 y.o.  with an WHITNEY of 6/3/2025, by Last Menstrual Period at 39w0d who is being admitted for induction of labor. History is significant for T1DM, fibroid uterus, BMI 45. She denies having uterine contractions, has no LOF, and reports no VB. She states she has felt good FM.    Problem List[1]    OB Hx:  OB History    Para Term  AB Living   1        SAB IAB Ectopic Multiple Live Births             # Outcome Date GA Lbr En/2nd Weight Sex Type Anes PTL Lv   1 Current              Past Medical Hx:  Past Medical History[2]    Past Surgical  hx:  Past Surgical History[3]    Social History     Socioeconomic History    Marital status: Registered Domestic Partner     Spouse name: Not on file    Number of children: 0    Years of education: Not on file    Highest education level: Not on file   Occupational History    Not on file   Tobacco Use    Smoking status: Never    Smokeless tobacco: Never   Vaping Use    Vaping status: Never Used   Substance and Sexual Activity    Alcohol use: Not Currently    Drug use: Never    Sexual activity: Yes     Partners: Male     Birth control/protection: None   Other Topics Concern    Not on file   Social History Narrative    Not on file     Social Drivers of Health     Financial Resource Strain: Not on file   Food Insecurity: No Food Insecurity (11/8/2024)    Nursing - Inadequate Food Risk Classification     Worried About Running Out of Food in the Last Year: Never true     Ran Out of Food in the Last Year: Never true     Ran Out of Food in the Last Year: Not on file   Transportation Needs: No Transportation Needs (11/8/2024)    PRAPARE - Transportation     Lack of Transportation (Medical): No     Lack of Transportation (Non-Medical): No   Physical Activity: Not on file   Stress: Not on file   Social Connections: Not on file   Intimate Partner Violence: Patient Unable To Answer (5/27/2025)    Nursing IPS     Feels Physically and Emotionally Safe: Not on file     Physically Hurt by Someone: Not on file     Humiliated or Emotionally Abused by Someone: Not on file     Physically Hurt by Someone: Patient unable to answer     Hurt or Threatened by Someone: Patient unable to answer   Housing Stability: Low Risk  (11/8/2024)    Housing Stability Vital Sign     Unable to Pay for Housing in the Last Year: No     Number of Times Moved in the Last Year: 0     Homeless in the Last Year: No     Allergies[4]      Medications Prior to Admission:     Blood Glucose Monitoring Suppl (OneTouch Verio Reflect) w/Device KIT    Glucagon, rDNA,  (Glucagon Emergency) 1 MG KIT    glucose blood (OneTouch Verio) test strip    Insulin Glargine Solostar (Lantus SoloStar) 100 UNIT/ML SOPN    insulin lispro (HumALOG/ADMELOG) 100 units/mL injection    Insulin Pen Needle (BD Pen Needle Luz 2nd Gen) 32G X 4 MM MISC    OneTouch Delica Lancets 33G MISC    Prenatal Multivit-Min-Fe-FA (PRE- PO)    Alcohol Swabs 70 % PADS    Objective:  Temp:  [97.8 °F (36.6 °C)-98.5 °F (36.9 °C)] 97.8 °F (36.6 °C)  HR:  [70-82] 75  BP: (113-140)/(60-88) 140/85  Resp:  [16-18] 16  SpO2:  [97 %] 97 %  Body mass index is 45.48 kg/m².     Physical Exam:  Physical Exam  Vitals reviewed.   Constitutional:       General: She is not in acute distress.     Appearance: She is well-developed.   HENT:      Head: Normocephalic and atraumatic.     Cardiovascular:      Rate and Rhythm: Normal rate.   Pulmonary:      Effort: Pulmonary effort is normal. No respiratory distress.   Abdominal:      Palpations: Abdomen is soft.      Tenderness: There is no abdominal tenderness.      Comments: Gravid   Genitourinary:     Comments: Normal appearing external female genitalia  SVE: 0/0/-5    Skin:     Findings: No rash (on exposed skin).     Neurological:      Mental Status: She is alert and oriented to person, place, and time.     Psychiatric:         Mood and Affect: Affect normal.         Speech: Speech normal.         Behavior: Behavior normal.        FHT:  Baseline Rate (FHR): 125 bpm  Variability: Moderate  Accelerations: 15 x 15 or greater, At variable times  Decelerations: None  FHR Category: Category I    TOCO:   Contraction Frequency (minutes): x2  Contraction Duration (seconds): 70-90  Contraction Intensity: Mild    Lab Results   Component Value Date    WBC 8.81 2025    HGB 12.9 2025    HCT 37.0 2025     (L) 2025     Lab Results   Component Value Date    K 4.3 2025     2025    CO2 25 2025    BUN 12 2025    CREATININE 0.53 (L) 2025     AST 30 03/26/2025    ALT 56 (H) 03/26/2025     Prenatal Labs:   Blood type: O pos  Antibody: neg  GBS: neg  HIV: nr  Rubella: immune  Syphilis IgM/IgG: pending  HBsAg: nr  HCAb: nr  Chlamydia: neg  Gonorrhea: neg  Diabetes 1 hour screen: n/a  3 hour glucose: n/a  Platelets: 125  Hgb: 12.9  Expected length of stay: >2 Midnights  Admission status: INPATIENT     Pippa Garcia MD  OBGYN PGY-1  05/28/25  1:03 AM       [1]   Patient Active Problem List  Diagnosis    Hypertriglyceridemia    Type 1 diabetes mellitus on insulin therapy (HCC)    Uterine fibroid in pregnancy    Class 3 severe obesity due to excess calories with serious comorbidity and body mass index (BMI) of 40.0 to 44.9 in adult    Type 1 diabetes mellitus affecting pregnancy, antepartum    39 weeks gestation of pregnancy    Obesity affecting pregnancy in third trimester    Pregnancy complicated by pre-existing type 1 diabetes, third trimester   [2]   Past Medical History:  Diagnosis Date    Chlamydia 2014    Diabetes mellitus (HCC) 2/27/24    Admitted to the hospital    Type 1 diabetes mellitus (HCC) 02/27/2024    DKA on admission    Varicella     had disease and vaccines   [3] No past surgical history on file.  [4] No Known Allergies

## 2025-05-28 NOTE — OB LABOR/OXYTOCIN SAFETY PROGRESS
Labor Progress Note - Yu Ariza 34 y.o. female MRN: [id number]    Unit/Bed#: -01 Encounter: [id number]       Contraction Frequency (minutes): 2-3  Contraction Intensity: Mild/Moderate  Uterine Activity Characteristics: Irregular  Cervical Dilation: 1        Cervical Effacement: 50  Fetal Station: -3  Baseline Rate (FHR): 140 bpm  Fetal Heart Rate (FHT): 140 BPM  FHR Category: 1  Oxytocin Safety Progress Check: Safety check completed            Vital Signs:   Vitals:    05/28/25 0954   BP: 139/84   Pulse: 71   Resp:    Temp:    SpO2:        Notes/comments:   -ashraf balloon placed   -plan to give cytotec PV once contractions space out  -pain intervention prn  -recheck prn     Leann Hung DO 5/28/2025 11:16 AM

## 2025-05-28 NOTE — ASSESSMENT & PLAN NOTE
Lab Results   Component Value Date    HGBA1C 6.0 (H) 03/26/2025     Recent Labs     05/29/25  2205 05/29/25  2242 05/29/25  2342 05/30/25  0046   POCGLU 53* 82 82 79     Blood Sugar Average: Last 72 hrs:  (P) [id number]    T1DM: s/p Endo consult  - Lantus 18-20u qhs, Humalog 6-8u tid w/ meals, SSI

## 2025-05-28 NOTE — OB LABOR/OXYTOCIN SAFETY PROGRESS
Oxytocin Safety Progress Check Note - Yu Ariza 34 y.o. female MRN: [id number]    Unit/Bed#: -01 Encounter: [id number]    Dose (anshul-units/min) Oxytocin: 2 anshul-units/min  Contraction Frequency (minutes): 5  Contraction Intensity: Mild  Uterine Activity Characteristics: Irregular  Cervical Dilation: 3-4        Cervical Effacement: 60  Fetal Station: -3  Baseline Rate (FHR): 135 bpm  Fetal Heart Rate (FHT): 140 BPM  FHR Category: I  Oxytocin Safety Progress Check: Safety check completed            Vital Signs:   Vitals:    05/28/25 1305   BP: 137/75   Pulse: 85   Resp:    Temp:    SpO2:        Notes/comments:   [place] balloon is out. Patient uncomfortable with contractions. Pitocin is on. Discussed about the labor course.   Plan: Epidural and AROM next check    Griffin Cueva Jr, MD 5/28/2025 2:04 PM

## 2025-05-28 NOTE — OB LABOR/OXYTOCIN SAFETY PROGRESS
Labor Progress Note - Yu Ariza 34 y.o. female MRN: [id number]    Unit/Bed#: -01 Encounter: [id number]       Contraction Frequency (minutes): 5  Contraction Intensity: Mild  Uterine Activity Characteristics: Irregular  Cervical Dilation: 1        Cervical Effacement: 50  Fetal Station: -3  Baseline Rate (FHR): 135 bpm  Fetal Heart Rate (FHT): 140 BPM  FHR Category: I  Oxytocin Safety Progress Check: Safety check completed            Vital Signs:   Vitals:    05/28/25 1303   BP:    Pulse:    Resp: 16   Temp: 98.2 °F (36.8 °C)   SpO2:        Notes/comments:   Patient with ashraf balloon in place initially presented regular contractions. And now contractions became more irregular. 4-8   minutes. Starting pitocin, pit bundle was completed and pitocin ordered.      Griffin Cueva Jr, MD 5/28/2025 1:06 PM

## 2025-05-29 PROBLEM — Z98.891 STATUS POST PRIMARY LOW TRANSVERSE CESAREAN SECTION: Status: ACTIVE | Noted: 2025-04-17

## 2025-05-29 PROBLEM — O14.90 PREECLAMPSIA: Status: ACTIVE | Noted: 2025-05-29

## 2025-05-29 LAB
BASE EXCESS BLDCOA CALC-SCNC: -3.7 MMOL/L (ref 3–11)
BASE EXCESS BLDCOV CALC-SCNC: -3.9 MMOL/L (ref 1–9)
ERYTHROCYTE [DISTWIDTH] IN BLOOD BY AUTOMATED COUNT: 12.9 % (ref 11.6–15.1)
GLUCOSE SERPL-MCNC: 103 MG/DL (ref 65–140)
GLUCOSE SERPL-MCNC: 110 MG/DL (ref 65–140)
GLUCOSE SERPL-MCNC: 110 MG/DL (ref 65–140)
GLUCOSE SERPL-MCNC: 113 MG/DL (ref 65–140)
GLUCOSE SERPL-MCNC: 122 MG/DL (ref 65–140)
GLUCOSE SERPL-MCNC: 195 MG/DL (ref 65–140)
GLUCOSE SERPL-MCNC: 202 MG/DL (ref 65–140)
GLUCOSE SERPL-MCNC: 53 MG/DL (ref 65–140)
GLUCOSE SERPL-MCNC: 68 MG/DL (ref 65–140)
GLUCOSE SERPL-MCNC: 71 MG/DL (ref 65–140)
GLUCOSE SERPL-MCNC: 74 MG/DL (ref 65–140)
GLUCOSE SERPL-MCNC: 76 MG/DL (ref 65–140)
GLUCOSE SERPL-MCNC: 81 MG/DL (ref 65–140)
GLUCOSE SERPL-MCNC: 82 MG/DL (ref 65–140)
GLUCOSE SERPL-MCNC: 82 MG/DL (ref 65–140)
GLUCOSE SERPL-MCNC: 86 MG/DL (ref 65–140)
GLUCOSE SERPL-MCNC: 86 MG/DL (ref 65–140)
GLUCOSE SERPL-MCNC: 91 MG/DL (ref 65–140)
GLUCOSE SERPL-MCNC: 92 MG/DL (ref 65–140)
GLUCOSE SERPL-MCNC: 93 MG/DL (ref 65–140)
GLUCOSE SERPL-MCNC: 95 MG/DL (ref 65–140)
GLUCOSE SERPL-MCNC: 97 MG/DL (ref 65–140)
GLUCOSE SERPL-MCNC: 98 MG/DL (ref 65–140)
HCO3 BLDCOA-SCNC: 24.6 MMOL/L (ref 17.3–27.3)
HCO3 BLDCOV-SCNC: 22.2 MMOL/L (ref 12.2–28.6)
HCT VFR BLD AUTO: 32.9 % (ref 34.8–46.1)
HGB BLD-MCNC: 11.3 G/DL (ref 11.5–15.4)
MCH RBC QN AUTO: 29.9 PG (ref 26.8–34.3)
MCHC RBC AUTO-ENTMCNC: 34.3 G/DL (ref 31.4–37.4)
MCV RBC AUTO: 87 FL (ref 82–98)
OXYHGB MFR BLDCOA: 10.1 %
OXYHGB MFR BLDCOV: 36.4 %
PCO2 BLDCOA: 56.8 MM[HG] (ref 30–60)
PCO2 BLDCOV: 44 MM HG (ref 27–43)
PH BLDCOA: 7.25 [PH] (ref 7.23–7.43)
PH BLDCOV: 7.32 [PH] (ref 7.19–7.49)
PLATELET # BLD AUTO: 109 THOUSANDS/UL (ref 149–390)
PMV BLD AUTO: 12 FL (ref 8.9–12.7)
PO2 BLDCOA: <10 MM HG (ref 5–25)
PO2 BLDCOV: 17.7 MM HG (ref 15–45)
RBC # BLD AUTO: 3.78 MILLION/UL (ref 3.81–5.12)
SAO2 % BLDCOV: 8.6 ML/DL
WBC # BLD AUTO: 13.62 THOUSAND/UL (ref 4.31–10.16)

## 2025-05-29 PROCEDURE — 88307 TISSUE EXAM BY PATHOLOGIST: CPT | Performed by: PATHOLOGY

## 2025-05-29 PROCEDURE — A6250 SKIN SEAL PROTECT MOISTURIZR: HCPCS | Performed by: OBSTETRICS & GYNECOLOGY

## 2025-05-29 PROCEDURE — 85027 COMPLETE CBC AUTOMATED: CPT

## 2025-05-29 PROCEDURE — 99254 IP/OBS CNSLTJ NEW/EST MOD 60: CPT | Performed by: INTERNAL MEDICINE

## 2025-05-29 PROCEDURE — 82805 BLOOD GASES W/O2 SATURATION: CPT | Performed by: OBSTETRICS & GYNECOLOGY

## 2025-05-29 PROCEDURE — 82948 REAGENT STRIP/BLOOD GLUCOSE: CPT

## 2025-05-29 PROCEDURE — 59510 CESAREAN DELIVERY: CPT | Performed by: OBSTETRICS & GYNECOLOGY

## 2025-05-29 RX ORDER — CEFAZOLIN SODIUM 1 G/50ML
1000 SOLUTION INTRAVENOUS ONCE
Status: COMPLETED | OUTPATIENT
Start: 2025-05-29 | End: 2025-05-29

## 2025-05-29 RX ORDER — DEXTROSE MONOHYDRATE AND SODIUM CHLORIDE 5; .9 G/100ML; G/100ML
50 INJECTION, SOLUTION INTRAVENOUS CONTINUOUS
Status: DISPENSED | OUTPATIENT
Start: 2025-05-29 | End: 2025-05-29

## 2025-05-29 RX ORDER — SIMETHICONE 80 MG
80 TABLET,CHEWABLE ORAL 4 TIMES DAILY PRN
Status: DISCONTINUED | OUTPATIENT
Start: 2025-05-29 | End: 2025-05-31 | Stop reason: HOSPADM

## 2025-05-29 RX ORDER — ENOXAPARIN SODIUM 100 MG/ML
40 INJECTION SUBCUTANEOUS EVERY 12 HOURS SCHEDULED
Status: DISCONTINUED | OUTPATIENT
Start: 2025-05-29 | End: 2025-05-31 | Stop reason: HOSPADM

## 2025-05-29 RX ORDER — MORPHINE SULFATE 0.5 MG/ML
INJECTION, SOLUTION EPIDURAL; INTRATHECAL; INTRAVENOUS AS NEEDED
Status: DISCONTINUED | OUTPATIENT
Start: 2025-05-29 | End: 2025-05-29

## 2025-05-29 RX ORDER — CEFAZOLIN SODIUM 3 G/50ML
3000 SOLUTION INTRAVENOUS ONCE
Status: DISCONTINUED | OUTPATIENT
Start: 2025-05-29 | End: 2025-05-29

## 2025-05-29 RX ORDER — ONDANSETRON 2 MG/ML
4 INJECTION INTRAMUSCULAR; INTRAVENOUS EVERY 6 HOURS PRN
Status: DISCONTINUED | OUTPATIENT
Start: 2025-05-29 | End: 2025-05-29

## 2025-05-29 RX ORDER — ACETAMINOPHEN 325 MG/1
650 TABLET ORAL EVERY 6 HOURS SCHEDULED
Status: DISCONTINUED | OUTPATIENT
Start: 2025-05-29 | End: 2025-05-31 | Stop reason: HOSPADM

## 2025-05-29 RX ORDER — METRONIDAZOLE 500 MG/1
500 TABLET ORAL EVERY 12 HOURS SCHEDULED
Status: COMPLETED | OUTPATIENT
Start: 2025-05-29 | End: 2025-05-30

## 2025-05-29 RX ORDER — INSULIN LISPRO 100 [IU]/ML
6 INJECTION, SOLUTION INTRAVENOUS; SUBCUTANEOUS
Status: DISCONTINUED | OUTPATIENT
Start: 2025-05-29 | End: 2025-05-31 | Stop reason: HOSPADM

## 2025-05-29 RX ORDER — ONDANSETRON 2 MG/ML
4 INJECTION INTRAMUSCULAR; INTRAVENOUS ONCE AS NEEDED
Status: DISCONTINUED | OUTPATIENT
Start: 2025-05-29 | End: 2025-05-29

## 2025-05-29 RX ORDER — BENZOCAINE/MENTHOL 6 MG-10 MG
1 LOZENGE MUCOUS MEMBRANE DAILY PRN
Status: DISCONTINUED | OUTPATIENT
Start: 2025-05-29 | End: 2025-05-31 | Stop reason: HOSPADM

## 2025-05-29 RX ORDER — ONDANSETRON 2 MG/ML
4 INJECTION INTRAMUSCULAR; INTRAVENOUS EVERY 8 HOURS PRN
Status: DISCONTINUED | OUTPATIENT
Start: 2025-05-29 | End: 2025-05-31 | Stop reason: HOSPADM

## 2025-05-29 RX ORDER — CALCIUM CARBONATE 500 MG/1
1000 TABLET, CHEWABLE ORAL 3 TIMES DAILY PRN
Status: DISCONTINUED | OUTPATIENT
Start: 2025-05-29 | End: 2025-05-31 | Stop reason: HOSPADM

## 2025-05-29 RX ORDER — IBUPROFEN 600 MG/1
600 TABLET, FILM COATED ORAL EVERY 6 HOURS
Status: DISCONTINUED | OUTPATIENT
Start: 2025-05-30 | End: 2025-05-31 | Stop reason: HOSPADM

## 2025-05-29 RX ORDER — HYDROMORPHONE HCL/PF 1 MG/ML
0.5 SYRINGE (ML) INJECTION EVERY 2 HOUR PRN
Refills: 0 | Status: DISCONTINUED | OUTPATIENT
Start: 2025-05-29 | End: 2025-05-30

## 2025-05-29 RX ORDER — FENTANYL CITRATE/PF 50 MCG/ML
25 SYRINGE (ML) INJECTION
Status: DISCONTINUED | OUTPATIENT
Start: 2025-05-29 | End: 2025-05-29

## 2025-05-29 RX ORDER — ONDANSETRON 2 MG/ML
INJECTION INTRAMUSCULAR; INTRAVENOUS AS NEEDED
Status: DISCONTINUED | OUTPATIENT
Start: 2025-05-29 | End: 2025-05-29

## 2025-05-29 RX ORDER — SODIUM CHLORIDE, SODIUM LACTATE, POTASSIUM CHLORIDE, CALCIUM CHLORIDE 600; 310; 30; 20 MG/100ML; MG/100ML; MG/100ML; MG/100ML
INJECTION, SOLUTION INTRAVENOUS CONTINUOUS PRN
Status: DISCONTINUED | OUTPATIENT
Start: 2025-05-29 | End: 2025-05-29

## 2025-05-29 RX ORDER — INSULIN LISPRO 100 [IU]/ML
1-5 INJECTION, SOLUTION INTRAVENOUS; SUBCUTANEOUS
Status: DISCONTINUED | OUTPATIENT
Start: 2025-05-29 | End: 2025-05-31 | Stop reason: HOSPADM

## 2025-05-29 RX ORDER — OXYCODONE HYDROCHLORIDE 10 MG/1
10 TABLET ORAL EVERY 4 HOURS PRN
Status: DISCONTINUED | OUTPATIENT
Start: 2025-05-30 | End: 2025-05-31 | Stop reason: HOSPADM

## 2025-05-29 RX ORDER — OXYCODONE HYDROCHLORIDE 5 MG/1
5 TABLET ORAL EVERY 4 HOURS PRN
Status: DISCONTINUED | OUTPATIENT
Start: 2025-05-30 | End: 2025-05-31 | Stop reason: HOSPADM

## 2025-05-29 RX ORDER — NALBUPHINE HYDROCHLORIDE 10 MG/ML
5 INJECTION INTRAMUSCULAR; INTRAVENOUS; SUBCUTANEOUS
Status: DISCONTINUED | OUTPATIENT
Start: 2025-05-29 | End: 2025-05-30

## 2025-05-29 RX ORDER — OXYTOCIN/0.9 % SODIUM CHLORIDE 30/500 ML
62.5 PLASTIC BAG, INJECTION (ML) INTRAVENOUS ONCE
Status: COMPLETED | OUTPATIENT
Start: 2025-05-29 | End: 2025-05-29

## 2025-05-29 RX ORDER — LIDOCAINE HYDROCHLORIDE AND EPINEPHRINE 20; 5 MG/ML; UG/ML
INJECTION, SOLUTION EPIDURAL; INFILTRATION; INTRACAUDAL; PERINEURAL AS NEEDED
Status: DISCONTINUED | OUTPATIENT
Start: 2025-05-29 | End: 2025-05-29

## 2025-05-29 RX ORDER — KETOROLAC TROMETHAMINE 30 MG/ML
INJECTION, SOLUTION INTRAMUSCULAR; INTRAVENOUS AS NEEDED
Status: DISCONTINUED | OUTPATIENT
Start: 2025-05-29 | End: 2025-05-29

## 2025-05-29 RX ORDER — OXYTOCIN/0.9 % SODIUM CHLORIDE 30/500 ML
PLASTIC BAG, INJECTION (ML) INTRAVENOUS CONTINUOUS PRN
Status: DISCONTINUED | OUTPATIENT
Start: 2025-05-29 | End: 2025-05-29

## 2025-05-29 RX ORDER — NALOXONE HYDROCHLORIDE 0.4 MG/ML
0.1 INJECTION, SOLUTION INTRAMUSCULAR; INTRAVENOUS; SUBCUTANEOUS
Status: DISCONTINUED | OUTPATIENT
Start: 2025-05-29 | End: 2025-05-30

## 2025-05-29 RX ORDER — INSULIN GLARGINE 100 [IU]/ML
18 INJECTION, SOLUTION SUBCUTANEOUS
Status: DISCONTINUED | OUTPATIENT
Start: 2025-05-29 | End: 2025-05-31 | Stop reason: HOSPADM

## 2025-05-29 RX ORDER — METOCLOPRAMIDE HYDROCHLORIDE 5 MG/ML
10 INJECTION INTRAMUSCULAR; INTRAVENOUS ONCE AS NEEDED
Status: DISCONTINUED | OUTPATIENT
Start: 2025-05-29 | End: 2025-05-29

## 2025-05-29 RX ORDER — METOCLOPRAMIDE HYDROCHLORIDE 5 MG/ML
5 INJECTION INTRAMUSCULAR; INTRAVENOUS EVERY 6 HOURS PRN
Status: DISCONTINUED | OUTPATIENT
Start: 2025-05-29 | End: 2025-05-29

## 2025-05-29 RX ORDER — CEFAZOLIN SODIUM 2 G/50ML
2000 SOLUTION INTRAVENOUS ONCE
Status: COMPLETED | OUTPATIENT
Start: 2025-05-29 | End: 2025-05-29

## 2025-05-29 RX ORDER — KETOROLAC TROMETHAMINE 30 MG/ML
15 INJECTION, SOLUTION INTRAMUSCULAR; INTRAVENOUS EVERY 6 HOURS SCHEDULED
Status: COMPLETED | OUTPATIENT
Start: 2025-05-29 | End: 2025-05-30

## 2025-05-29 RX ORDER — DIPHENHYDRAMINE HYDROCHLORIDE 50 MG/ML
25 INJECTION, SOLUTION INTRAMUSCULAR; INTRAVENOUS EVERY 6 HOURS PRN
Status: DISCONTINUED | OUTPATIENT
Start: 2025-05-29 | End: 2025-05-31 | Stop reason: HOSPADM

## 2025-05-29 RX ADMIN — CEPHALEXIN 500 MG: 250 CAPSULE ORAL at 18:21

## 2025-05-29 RX ADMIN — SODIUM CHLORIDE, SODIUM LACTATE, POTASSIUM CHLORIDE, AND CALCIUM CHLORIDE: .6; .31; .03; .02 INJECTION, SOLUTION INTRAVENOUS at 00:30

## 2025-05-29 RX ADMIN — METRONIDAZOLE 500 MG: 500 TABLET ORAL at 20:26

## 2025-05-29 RX ADMIN — PHENYLEPHRINE HYDROCHLORIDE 50 MCG/MIN: 50 INJECTION INTRAVENOUS at 00:38

## 2025-05-29 RX ADMIN — KETOROLAC TROMETHAMINE 30 MG: 30 INJECTION, SOLUTION INTRAMUSCULAR; INTRAVENOUS at 01:38

## 2025-05-29 RX ADMIN — ACETAMINOPHEN 650 MG: 325 TABLET, FILM COATED ORAL at 18:21

## 2025-05-29 RX ADMIN — ACETAMINOPHEN 650 MG: 325 TABLET, FILM COATED ORAL at 05:25

## 2025-05-29 RX ADMIN — LIDOCAINE HYDROCHLORIDE,EPINEPHRINE BITARTRATE 5 ML: 20; .005 INJECTION, SOLUTION EPIDURAL; INFILTRATION; INTRACAUDAL; PERINEURAL at 00:37

## 2025-05-29 RX ADMIN — KETOROLAC TROMETHAMINE 15 MG: 30 INJECTION, SOLUTION INTRAMUSCULAR; INTRAVENOUS at 20:26

## 2025-05-29 RX ADMIN — LIDOCAINE HYDROCHLORIDE,EPINEPHRINE BITARTRATE 5 ML: 20; .005 INJECTION, SOLUTION EPIDURAL; INFILTRATION; INTRACAUDAL; PERINEURAL at 00:34

## 2025-05-29 RX ADMIN — KETOROLAC TROMETHAMINE 15 MG: 30 INJECTION, SOLUTION INTRAMUSCULAR; INTRAVENOUS at 13:53

## 2025-05-29 RX ADMIN — LIDOCAINE HYDROCHLORIDE,EPINEPHRINE BITARTRATE 5 ML: 20; .005 INJECTION, SOLUTION EPIDURAL; INFILTRATION; INTRACAUDAL; PERINEURAL at 00:40

## 2025-05-29 RX ADMIN — LIDOCAINE HYDROCHLORIDE,EPINEPHRINE BITARTRATE 5 ML: 20; .005 INJECTION, SOLUTION EPIDURAL; INFILTRATION; INTRACAUDAL; PERINEURAL at 00:31

## 2025-05-29 RX ADMIN — CEFAZOLIN SODIUM 2000 MG: 2 SOLUTION INTRAVENOUS at 00:42

## 2025-05-29 RX ADMIN — INSULIN GLARGINE 18 UNITS: 100 INJECTION, SOLUTION SUBCUTANEOUS at 22:16

## 2025-05-29 RX ADMIN — Medication 62.5 MILLI-UNITS/MIN: at 02:56

## 2025-05-29 RX ADMIN — MORPHINE SULFATE 1 MG: 0.5 INJECTION, SOLUTION EPIDURAL; INTRATHECAL; INTRAVENOUS at 01:05

## 2025-05-29 RX ADMIN — Medication 250 MILLI-UNITS/MIN: at 00:56

## 2025-05-29 RX ADMIN — AZITHROMYCIN DIHYDRATE 500 MG: 500 INJECTION, POWDER, LYOPHILIZED, FOR SOLUTION INTRAVENOUS at 00:47

## 2025-05-29 RX ADMIN — ONDANSETRON 4 MG: 2 INJECTION INTRAMUSCULAR; INTRAVENOUS at 00:57

## 2025-05-29 RX ADMIN — MORPHINE SULFATE 1 MG: 0.5 INJECTION, SOLUTION EPIDURAL; INTRATHECAL; INTRAVENOUS at 01:07

## 2025-05-29 RX ADMIN — ACETAMINOPHEN 650 MG: 325 TABLET, FILM COATED ORAL at 12:19

## 2025-05-29 RX ADMIN — INSULIN LISPRO 6 UNITS: 100 INJECTION, SOLUTION INTRAVENOUS; SUBCUTANEOUS at 18:21

## 2025-05-29 RX ADMIN — METRONIDAZOLE 500 MG: 500 TABLET ORAL at 09:19

## 2025-05-29 RX ADMIN — CEPHALEXIN 500 MG: 250 CAPSULE ORAL at 05:25

## 2025-05-29 RX ADMIN — KETOROLAC TROMETHAMINE 15 MG: 30 INJECTION, SOLUTION INTRAMUSCULAR; INTRAVENOUS at 07:39

## 2025-05-29 RX ADMIN — CEFAZOLIN SODIUM 1000 MG: 1 SOLUTION INTRAVENOUS at 00:42

## 2025-05-29 RX ADMIN — CEPHALEXIN 500 MG: 250 CAPSULE ORAL at 12:20

## 2025-05-29 RX ADMIN — MORPHINE SULFATE 1 MG: 0.5 INJECTION, SOLUTION EPIDURAL; INTRATHECAL; INTRAVENOUS at 01:06

## 2025-05-29 NOTE — CONSULTS
Consultation - [facility]    Patient Information: Yu Ariza 34 y.o. female MRN: [id number]  Unit/Bed#: -01 Encounter: [id number]  Admitting Physician: Getachew Petit MD  PCP: Self Self  Date of Consultation:  25    ASSESSMENT:      PLAN:    Type 1 diabetes. She is requiring insulin at lower than pre-pregnancy dose. She plans to breastfeed. We agreed to use Lantus 18-20u qhs plus humalog 6-8u tidac plus correctional insulin 1u/50 above 150mg/dl. Orders placed. Instructions in discharge. Follow with endocrinology as outpt. Suggested to use a personal cgm.  Post op C section. Glycemic goal is 100-180mg/dL for next 2 weeks then 80-180mg/dL.  Obesity - follow up as outpt.    Total time spent was 40 min of which >50% was in coordination of care.      Reason for consultation:   Type 1 diabetes    History of Present Illness:    Yu Ariza is a 34 y.o. female with pre-existing Type 1 diabetes, Hx DKA, Obesity BMI 45, HLD.     Pre-pregnancy regimen was lantus 36u qhs and humalog 10-12u tidac.    She is now post .      Review of Systems:    Review of Systems   Constitutional:  Negative for chills and fever.   HENT:  Negative for ear pain and sore throat.    Eyes:  Negative for pain and visual disturbance.   Respiratory:  Negative for cough and shortness of breath.    Cardiovascular:  Negative for chest pain and palpitations.   Gastrointestinal:  Negative for abdominal pain and vomiting.   Genitourinary:  Negative for dysuria and hematuria.   Musculoskeletal:  Negative for arthralgias and back pain.   Skin:  Negative for color change and rash.   Neurological:  Negative for seizures and syncope.   All other systems reviewed and are negative.      Past Medical and Surgical History:     Past Medical History[1]    Past Surgical History[2]    Meds/Allergies:    PTA meds:   Prior to Admission Medications   Prescriptions Last Dose Informant Patient Reported? Taking?   Alcohol  "Swabs 70 % PADS  Self No No   Sig: May substitute brand based on insurance coverage. Check glucose ACHS.   Blood Glucose Monitoring Suppl (OneTouch Verio Reflect) w/Device KIT 2025 Self No Yes   Sig: May substitute brand based on insurance coverage. Check glucose ACHS.   Glucagon, rDNA, (Glucagon Emergency) 1 MG KIT 2025 Self Yes Yes   Insulin Glargine Solostar (Lantus SoloStar) 100 UNIT/ML SOPN 2025 Evening Self No Yes   Sig: Inject 0.38 mL (38 Units total) under the skin daily in the early morning   Insulin Pen Needle (BD Pen Needle Luz 2nd Gen) 32G X 4 MM MISC 2025 Self No Yes   Sig: USE WITH INSULIN PEN   OneTouch Delica Lancets 33G MISC 2025 Self No Yes   Sig: May substitute brand based on insurance coverage. Check glucose ACHS.   Prenatal Multivit-Min-Fe-FA (PRE-ARYAN PO) 2025 Morning Self Yes Yes   Sig: Take by mouth in the morning.   glucose blood (OneTouch Verio) test strip 2025 Self No Yes   Sig: May substitute brand based on insurance coverage. Check glucose ACHS.   insulin lispro (HumALOG/ADMELOG) 100 units/mL injection 2025 Evening Self No Yes   Sig: Inject 2-12 Units under the skin 3 (three) times a day before meals      Facility-Administered Medications: None       Allergies: Allergies[3]  History:     Marital Status: Registered Domestic Partner   Occupation:   Substance Use History:   Social History     Substance and Sexual Activity   Alcohol Use Not Currently     Tobacco Use History[4]  Social History     Substance and Sexual Activity   Drug Use Never       Family History:    Family History[5]    Physical Exam:     Vitals:   Blood Pressure: 135/86 (25 1203)  Pulse: 90 (25 1203)  Temperature: 97.8 °F (36.6 °C) (25 1203)  Temp Source: Oral (25 1203)  Respirations: 18 (25 1203)  Height: 5' 6\" (167.6 cm) (25)  Weight - Scale: 128 kg (281 lb 12.8 oz) (25)  SpO2: 98 % (25 1203)    Physical " Exam  Constitutional:       General: She is not in acute distress.     Appearance: She is well-developed.   HENT:      Head: Normocephalic and atraumatic.      Nose: Nose normal.     Eyes:      Conjunctiva/sclera: Conjunctivae normal.     Pulmonary:      Effort: Pulmonary effort is normal.   Abdominal:      General: There is no distension.     Musculoskeletal:      Cervical back: Normal range of motion and neck supple.     Skin:     Findings: No rash.      Comments: No icterus     Neurological:      Mental Status: She is alert and oriented to person, place, and time.           Lab and Imaging Results: Results Review Statement: No pertinent imaging studies reviewed.    Results from last 7 days   Lab Units 05/29/25  0939 05/27/25  2042   WBC Thousand/uL 13.62* 8.81   HEMOGLOBIN g/dL 11.3* 12.9   HEMATOCRIT % 32.9* 37.0   PLATELETS Thousands/uL 109* 125*   LYMPHO PCT %  --  22   MONO PCT %  --  6   EOS PCT %  --  1     Results from last 7 days   Lab Units 05/27/25  2042   POTASSIUM mmol/L 4.0   CHLORIDE mmol/L 106   CO2 mmol/L 21   BUN mg/dL 16   CREATININE mg/dL 0.55*   CALCIUM mg/dL 8.7   ALK PHOS U/L 143*   ALT U/L 21   AST U/L 22         POC Glucose (mg/dl)   Date Value   05/29/2025 86   05/29/2025 95   05/29/2025 113   05/29/2025 71   05/29/2025 74   05/29/2025 92   05/29/2025 202 (H)   05/29/2025 195 (H)   05/29/2025 93   05/29/2025 81         ** Please Note: Dragon 360 Dictation voice to text software may have been used in the creation of this document. **       [1]   Past Medical History:  Diagnosis Date    Chlamydia 2014    Diabetes mellitus (HCC) 2/27/24    Admitted to the hospital    Type 1 diabetes mellitus (HCC) 02/27/2024    DKA on admission    Varicella     had disease and vaccines   [2] No past surgical history on file.  [3] No Known Allergies  [4]   Social History  Tobacco Use   Smoking Status Never   Smokeless Tobacco Never   [5]   Family History  Problem Relation Name Age of Onset    Hypertension  Mother Therese Anderson     Hyperlipidemia Father N/a     Hypertension Father N/a     Diabetes type II Father N/a     Kidney disease Father N/a     Heart attack Maternal Grandmother      Heart disease Maternal Grandmother      No Known Problems Maternal Grandfather      No Known Problems Paternal Grandmother      Colon cancer Paternal Grandfather      No Known Problems Brother Maharaj     No Known Problems Maternal Aunt      No Known Problems Paternal Aunt      Diabetes type II Paternal Uncle      Breast cancer Neg Hx      Ovarian cancer Neg Hx      Cervical cancer Neg Hx      Uterine cancer Neg Hx

## 2025-05-29 NOTE — ANESTHESIA POSTPROCEDURE EVALUATION
Post-Op Assessment Note    CV Status:  Stable  Pain Score: 2    Pain management: adequate       Mental Status:  Alert   Hydration Status:  Stable   PONV Controlled:  None   Airway Patency:  Patent  Two or more mitigation strategies used for obstructive sleep apnea   Post Op Vitals Reviewed: Yes    No anethesia notable event occurred.    Staff: Anesthesiologist           Last Filed PACU Vitals:  Vitals Value Taken Time   Temp 99.1 °F (37.3 °C) 05/29/25 03:00   Pulse 85 05/29/25 03:13   /67 05/29/25 03:00   Resp 18 05/29/25 03:00   SpO2 92 % 05/29/25 03:11   Vitals shown include unfiled device data.    Modified Jeramy:     Vitals Value Taken Time   Activity 2 05/29/25 03:00   Respiration 2 05/29/25 03:00   Circulation 2 05/29/25 03:00   Consciousness 2 05/29/25 03:00   Oxygen Saturation 2 05/29/25 03:00     Modified Jeramy Score: 10

## 2025-05-29 NOTE — PLAN OF CARE
Problem: PAIN - ADULT  Goal: Verbalizes/displays adequate comfort level or baseline comfort level  Description: Interventions:  - Encourage patient to monitor pain and request assistance  - Assess pain using appropriate pain scale  - Administer analgesics as ordered based on type and severity of pain and evaluate response  - Implement non-pharmacological measures as appropriate and evaluate response  - Consider cultural and social influences on pain and pain management  - Notify physician/advanced practitioner if interventions unsuccessful or patient reports new pain  - Educate patient/family on pain management process including their role and importance of  reporting pain   - Provide non-pharmacologic/complimentary pain relief interventions  Outcome: Progressing     Problem: INFECTION - ADULT  Goal: Absence or prevention of progression during hospitalization  Description: INTERVENTIONS:  - Assess and monitor for signs and symptoms of infection  - Monitor lab/diagnostic results  - Monitor all insertion sites, i.e. indwelling lines, tubes, and drains  - Monitor endotracheal if appropriate and nasal secretions for changes in amount and color  - [place] appropriate cooling/warming therapies per order  - Administer medications as ordered  - Instruct and encourage patient and family to use good hand hygiene technique  - Identify and instruct in appropriate isolation precautions for identified infection/condition  Outcome: Progressing  Goal: Absence of fever/infection during neutropenic period  Description: INTERVENTIONS:  - Monitor WBC  - Perform strict hand hygiene  - Limit to healthy visitors only  - No plants, dried, fresh or silk flowers with velasquez in patient room  Outcome: Progressing     Problem: SAFETY ADULT  Goal: Patient will remain free of falls  Description: INTERVENTIONS:  - Educate patient/family on patient safety including physical limitations  - Instruct patient to call for assistance with activity   -  Consider consulting OT/PT to assist with strengthening/mobility based on AM PAC & JH-HLM score  - Consult OT/PT to assist with strengthening/mobility   - Keep Call bell within reach  - Keep bed low and locked with side rails adjusted as appropriate  - Keep care items and personal belongings within reach  - Initiate and maintain comfort rounds  - Make Fall Risk Sign visible to staff  - Apply yellow socks and bracelet for high fall risk patients  - Consider moving patient to room near nurses station  Outcome: Progressing  Goal: Maintain or return to baseline ADL function  Description: INTERVENTIONS:  -  Assess patient's ability to carry out ADLs; assess patient's baseline for ADL function and identify physical deficits which impact ability to perform ADLs (bathing, care of mouth/teeth, toileting, grooming, dressing, etc.)  - Assess/evaluate cause of self-care deficits   - Assess range of motion  - Assess patient's mobility; develop plan if impaired  - Assess patient's need for assistive devices and provide as appropriate  - Encourage maximum independence but intervene and supervise when necessary  - Involve family in performance of ADLs  - Assess for home care needs following discharge   - Consider OT consult to assist with ADL evaluation and planning for discharge  - Provide patient education as appropriate  - Monitor functional capacity and physical performance, use of AM PAC & JH-HLM   - Monitor gait, balance and fatigue with ambulation    Outcome: Progressing  Goal: Maintains/Returns to pre admission functional level  Description: INTERVENTIONS:  - Perform AM-PAC 6 Click Basic Mobility/ Daily Activity assessment daily.  - Set and communicate daily mobility goal to care team and patient/family/caregiver.   - Record patient progress and toleration of activity level   Outcome: Progressing     Problem: DISCHARGE PLANNING  Goal: Discharge to home or other facility with appropriate resources  Description:  INTERVENTIONS:  - Identify barriers to discharge w/patient and caregiver  - Arrange for needed discharge resources and transportation as appropriate  - Identify discharge learning needs (meds, wound care, etc.)  - Arrange for interpretive services to assist at discharge as needed  - Refer to Case Management Department for coordinating discharge planning if the patient needs post-hospital services based on physician/advanced practitioner order or complex needs related to functional status, cognitive ability, or social support system  Outcome: Progressing     Problem: POSTPARTUM  Goal: Experiences normal postpartum course  Description: INTERVENTIONS:  - Monitor maternal vital signs  - Assess uterine involution and lochia  Outcome: Progressing  Goal: Appropriate maternal -  bonding  Description: INTERVENTIONS:  - Identify family support  - Assess for appropriate maternal/infant bonding   -Encourage maternal/infant bonding opportunities  - Referral to  or  as needed  Outcome: Progressing  Goal: Establishment of infant feeding pattern  Description: INTERVENTIONS:  - Assess breast/bottle feeding  - Refer to lactation as needed  Outcome: Progressing  Goal: Incision(s), wounds(s) or drain site(s) healing without S/S of infection  Description: INTERVENTIONS  - Assess and document dressing, incision, wound bed, drain sites and surrounding tissue  - Provide patient and family education  Outcome: Progressing

## 2025-05-29 NOTE — OP NOTE
OPERATIVE REPORT  PATIENT NAME: Yu Ariza    :  1990  MRN: [id number]  Pt Location: [facility] L&D OR ROOM 02    SURGERY DATE: 2025    Surgeons and Role:     * Kimberley Sullivan MD - Primary     * Pippa Garcia MD - Assisting    Preop Diagnosis:  Maternal exhaustion complicating labor and delivery [O75.81]    Procedure(s) (LRB):   SECTION () (N/A), Primary    Specimen(s):  ID Type Source Tests Collected by Time Destination   1 :  Tissue (Placenta on Hold) OB Only Placenta TISSUE EXAM OB (PLACENTA) ONLY Kimberley Sullivan MD 2025 005    B :  Cord Blood Cord BLOOD GAS, VENOUS, CORD, BLOOD GAS, ARTERIAL, CORD Kimberley Sullivan MD 2025 0056      Surgical QBL:  Surgical QBL (mL): 551 mL    Drains:  Urethral Catheter Double-lumen;Non-latex 16 Fr. (Active)   Reasons to continue Urinary Catheter  Post-operative urological requirements 25 0300   Goal for Removal Remove POD#1 25 0300   Site Assessment Clean;Skin intact 25 0300   Sevilla Care Done 25 2100   Collection Container Standard drainage bag 25 0300   Securement Method Securing device (Describe) 25 0300   Output (mL) 350 mL 25 0508   Number of days: 1     Anesthesia Type:   Epidural    Operative Indications:  Maternal exhaustion complicating labor and delivery [O75.81]     Chris Group Classification System:  No Multiple pregnancy, No Transverse or oblique lie, No Breech lie, Gestational age is > or =37 weeks, Nulliparous, Labor induced +  is CHRIS GROUP 2a    Operative Findings:  Viable female  at 0056 with APGARs of 9 and 9 at 1 and 5 minutes, respectively. Fetus weight: 9lb 4oz.  Normal intact placenta with 3 vessel cord expressed at 0059.  Posterior uterine fibroid, intramural, 5cm.  Otherwise normal uterus, bilateral tubes and ovaries.  Fetal cord gases:  Recent Results (from the past 5 hours)   CORD, Blood gas, venous    Collection Time: 25 12:56 AM    Result Value Ref Range    pH, Cord Ang 7.321 7.190 - 7.490    pCO2, Cord Ang 44.0 (H) 27.0 - 43.0 mm HG    pO2, Cord Ang 17.7 15.0 - 45.0 mm HG    HCO3, Cord Ang 22.2 12.2 - 28.6 mmol/L    Base Exc, Cord Ang -3.9 (L) 1.0 - 9.0 mmol/L    O2 Cont, Cord Ang 8.6 mL/dL    O2 HGB,VENOUS CORD 36.4 %   CORD, Blood gas, arterial    Collection Time: 25 12:56 AM   Result Value Ref Range    pH, Cord Art 7.254 7.230 - 7.430    pCO2, Cord Art 56.8 30.0 - 60.0    pO2, Cord Art <10.0 5.0 - 25.0 mm HG    HCO3, Cord Art 24.6 17.3 - 27.3 mmol/L    Base Exc, Cord Art -3.7 (L) 3.0 - 11.0 mmol/L    O2 Content, Cord Art      O2 Hgb, Arterial Cord 10.1 %                                                                             Procedure and technique:  Yu Ariza presented as a 34 y.o.  at 39w0d with history significant for uterine fibroid, T1DM. She presented to labor and delivery for induction of labor in the setting of T1DM. On initial exam she was noted to be 0/0/-5. She was induced with Cytotec x2. [place] balloon was later placed. After dislodgement, she was 3.5/60/-3 and pitocin was started. Spontaneous rupture of membranes for clear fluid occurred. She progressed to complete and began to push.    After pushing for approximately 1 hour, the patient's pubic arch was noted to be narrow, and there had been little fetal descent. She expressed that she was tired from pushing and requested  section.    The patient was taken to the operating room. Epidural anesthesia was confirmed to be adequate and azithromycin and Ancef were given for preoperative prophylaxis. The patient was then placed in the dorsal supine position with a left tilt of the hips. The patient was prepped with chlorhexidine for vaginal prep and chloraprep for abdominal prep and draped in the usual sterile fashion.    A time out was performed to confirm correct patient and correct procedure. An incision was made in the skin with a surgical scalpel,  and sharp dissection was carried out over subsequent layers of tissue including the fascia, followed by the Bovie electrocautery for hemostasis. The fascia was incised at the midline, and the fascial incision was extended bilaterally in a blunt fashion.    The underlying rectus muscles were dissected off of the fascia in a blunt fashion.The rectus muscles were then divided at midline, and the peritoneum was identified and entered and extended superiorly and inferiorly in a blunt fashion. The bladder blade was inserted. A transverse incision was made in the lower uterine segment using a new surgical blade. The uterine incision was extended cephalad and caudal using blunt dissection. The amniotic sac was entered and the amniotic fluid was noted to be clear.    The surgeon's hand was placed into the uterine cavity. The fetal head was identified and elevated through the uterine incision with the assistance of fundal pressure. With gentle traction, the shoulder was delivered, followed by the rest of the fetal body. On delivery, the cord was doubly clamped and cut after delayed cord clamping. The infant was then passed off the table to awaiting  staff. The  was noted to cry spontaneously and moved all extremities. Venous and arterial blood gas, cord blood, and portion of cord were obtained for analysis and routine blood testing. The placenta was delivered and sent to pathology. The placenta was noted to be intact with a three-vessel cord. Oxytocin was administered by IV infusion to enhance uterine contraction. The uterus was exteriorized and cleared of all clots and remaining products of conception.    The uterine incision was re-approximated using 0 Vicryl in a running locked fashion. A second horizontal imbricating stitch with 0 Vicryl was applied. The uterine incision was examined and noted to be hemostatic. The posterior cul-de-sac was cleared of all clots and products of conception. The uterus was  replaced into the abdomen, and the paracolic gutters were cleared of all clots. The uterine incision was once again re-examined and noted to be hemostatic. The rectus muscles were examined and noted to be hemostatic. The fascia was re-approximated using 0 Vicryl in a running nonlocked fashion from two ends. The subcutaneous tissue was irrigated and cleared of all clots and debris. Good hemostasis was noted with Bovie electrocautery. The subcutaneous tissue was re-approximated with 2-0 plain gut in a running fashion. The skin incision was closed with 4-0 Monocryl in a running subcuticular fashion. Good hemostasis was noted. Mepilex dressing was applied.    Patient tolerated the procedure well. All needle, sponge, and instrument counts were noted to be correct x2 at the end of the procedure. Patient was transferred to the recovery room in stable condition. Attending physician Dr. Sullivan was present for the duration of the procedure.    Complications:   None apparent    Patient Disposition:  PACU     SIGNATURE: Pippa Garcia MD  DATE: May 29, 2025  TIME: 5:35 AM

## 2025-05-29 NOTE — OB LABOR/OXYTOCIN SAFETY PROGRESS
Oxytocin Safety Progress Check Note - Yu Ariza 34 y.o. female MRN: [id number]    Notes/comments:   SVE 10/100/+1. Will begin pushing shortly. Attending physician Dr. Sullivan aware.     Pippa Garcia MD 5/29/2025 5:49 AM

## 2025-05-29 NOTE — ANESTHESIA POSTPROCEDURE EVALUATION
Post-Op Assessment Note    CV Status:  Stable    Pain management: adequate      Post-op block assessment: no complications, site cleaned and catheter intact   Mental Status:  Alert and awake   Hydration Status:  Euvolemic   PONV Controlled:  Controlled   Airway Patency:  Patent     Post Op Vitals Reviewed: Yes    No anethesia notable event occurred.    Staff: CRNA           Last Filed PACU Vitals:  Vitals Value Taken Time   Temp 98.1    Pulse 80    /55    Resp     SpO2 99

## 2025-05-29 NOTE — OB LABOR/OXYTOCIN SAFETY PROGRESS
Oxytocin Safety Progress Check Note - Yu Ariza 34 y.o. female MRN: [id number]    Unit/Bed#: -01 Encounter: [id number]    Dose (anshul-units/min) Oxytocin: 0 anshul-units/min (Per Verbal Orders From Dr. Kimberley Sullivan)  Contraction Frequency (minutes): 4.5-5  Contraction Intensity: Moderate  Uterine Activity Characteristics: Regular  Cervical Dilation: 10  Dilation Complete Date: 05/28/25  Dilation Complete Time: 2254  Cervical Effacement: 100  Fetal Station: 1  Baseline Rate (FHR): 150 bpm  Fetal Heart Rate (FHT): 154 BPM  FHR Category: 2 for intermittent variables   Oxytocin Safety Progress Check: Safety check completed  Provider Notified: Yes  Provider Name: Dr. Pippa Garcia      Vital Signs:    Vitals:    05/29/25 0006   BP:    Pulse: (!) 110   Resp:    Temp:    SpO2: 99%       Notes/comments:   Patient pushing for over an hour with good maternal effort.  However, her pubic arch is narrow, and the fetal head has not made any descent over that time.  We discussed that she would likely be pushing for a long time to bring the fetal head under her arch, and she expressed that she is already tired from pushing and does not think she can push any longer.  Reviewed risks of surgery including bleeding, transfusion, infection, injury to surrounding organs including bowel/bladder.  All questions were answered.  Patient reaffirms consent for surgery.  Anesthesia, nursing, residents aware.  Await OR.         Kimberley Sullivan MD 5/29/2025 12:21 AM

## 2025-05-29 NOTE — OB LABOR/OXYTOCIN SAFETY PROGRESS
Oxytocin Safety Progress Check Note - Yu Ariza 34 y.o. female MRN: [id number]    Unit/Bed#: -01 Encounter: [id number]    Dose (anshul-units/min) Oxytocin: 8 anshul-units/min  Contraction Frequency (minutes): 2-3  Contraction Intensity: Moderate  Uterine Activity Characteristics: Regular  Cervical Dilation: Lip/rim (Comment)        Cervical Effacement: 100  Fetal Station: 0  Baseline Rate (FHR): 140 bpm  Fetal Heart Rate (FHT): 68 BPM  FHR Category: II  Oxytocin Safety Progress Check: Safety check completed  Provider Notified: Yes  Provider Name: Dr. Pippa Garcia    Vital Signs:   Vitals:    25 2215   BP: 120/69   Pulse: 89   Resp:    Temp:    SpO2:      Notes/comments:   Presented to bedside for prolonged deceleration, 8 minutes total; resolved with maternal repositioning. Ney with contractions q1-3min. SVE as above. Pit running at 8, will continue to titrate to contractions. Will recheck as clinically indicated. Anticipate . Attending physician Dr. Nate Garcia MD 2025 10:28 PM

## 2025-05-29 NOTE — DISCHARGE INSTR - AVS FIRST PAGE
"Insulin:    INSULIN DOSAGE INSTRUCTIONS    Name: Yu Ariza                        : 1990  MRN #: [id number]    Your Current Insulin  and dose is: Before Breakfast Before Lunch Before Evening Meal Bedtime     Humalog Insulin   8u   8u   8u    Regular, Apidra, Humalog orNovolog Sliding Scale:   <80              151-200 + 1 +1 +1    201-250 +2 +2 +2 +   251-300 +3 +3 +3 +   301-350 +4 +4 +4 +   >350 +5 +5 +5 +     Lantus    18u     Additional Instructions:   Please test your blood sugar: 4 times daily- before meals and bedtime OR use a glucose sensor.  Target Blood sugar range _100_to _180__.  Call if your Self Self  blood sugar is less than _60_ or greater than _400__.    Today's Date: 2025                   Discharge instructions:   -Do not place anything (no partner, sex toys, tampons, douche, etc.) in your vagina for 6 weeks  -You may walk for exercise for the first 6 weeks then gradually return to your usual activities   -Please do not drive for 1 week if you have no stitches and for 2 weeks if you have stitches    -Please do not drive if you are taking any narcotic medications  -You may take baths or shower per your preference   -Please examine your breasts in the mirror daily and call your doctor for redness, tenderness, increased warmth, fevers, or chills  -Please call your doctor's office for temperature > 100.4*F or 38*C, worsening pain, increased bleeding (filling 2 or more maxi pads within 1 hr or less for at least 2 hrs), foul-smelling discharge/drainage, burning with urination, or symptoms of depression    For pain, you may take 650mg of Tylenol every 6 hours  For cramping, you may take 600mg of ibuprofen every 6 hours    You can alternate Tylenol and ibuprofen and take them \"around the clock\" to stay ahead of pain. For example, take 650mg of Tylenol at 9 AM, then 600mg of ibuprofen at 12 PM, then 650mg of Tylenol at 3 PM, and so forth.     Please take over the counter stool " softener or laxative to ensure you do not strain when moving your bowels. You can take whatever is preferred (Miralax, Senna, Metamucil).    If you have any questions or concerns, please call your doctor.

## 2025-05-29 NOTE — LACTATION NOTE
This note was copied from a baby's chart.  CONSULT - LACTATION  Baby Girl Ariza (Stephanie) 0 days female MRN: [id number]    [facility] Room / Bed: (N)/(N) Encounter: [id number]    Maternal Information     MOTHER:  Yu Ariza  Maternal Age: 34 y.o.  OB History: # 1 - Date: 25, Sex: Female, Weight: 4205 g (9 lb 4.3 oz), GA: 39w2d, Type: , Low Transverse, Apgar1: 9, Apgar5: 9, Living: Living, Birth Comments: None   Previous breast reduction surgery? No    Lactation history:   Has patient previously breast fed: No   How long had patient previously breast fed:     Previous breast feeding complications:     No past surgical history on file.    Birth information:  YOB: 2023   Time of birth: 12:56 AM   Sex: female   Delivery type: , Low Transverse   Birth Weight: 4205 g (9 lb 4.3 oz)   Percent of Weight Change: Current weight not on file     Gestational Age: 39w2d   [unfilled]    Reason for Consult:    Reason for Consult: Initial assessment (ext) - 20 min, Latch Assess (ext) - 20 min, Pump Setup - 15 min, High Risk Infant - 10 min, Discharge (routine) - 10 min    Risk Factors:    Risk Factors: Maternal anatomy    Breast and nipple assessment:   Breasts/Nipples  Date Pumping Initiated: 25  Time Pumping Initiated: 0830  Left Breast: Soft, Other (Comment) (pendulous breasts; large dark areolas,)  Right Breast: Soft, Other (Comment) (pendulous; large breasts; large dark areolas)  Left Nipple: Everted, Other (Comment) (round;)  Right Nipple: Everted, Other (Comment) (everted)  Intervention: Hand expression, Lanolin, Breast pump  Breastfeeding Status: Yes  Breastfeeding Progress: Not yet established  Reasons for not Breastfeeding: Infant medical condition    OB Lactation Tools:    Other OB Lactation Tools  Feeding Devices: Lanolin, Pump, Feeding Cup    Breast Pump:    Breast Pump  Pump: Personal, Electric, Manual (mom  has a medela hands free)  Pump Review/Education: Setup, frequency, and cleaning, Milk storage, Other (Comment) (cycle and hands on pumping)  Initiated by: c hume, ibclc  Date Initiated: 25      [place] Assessment: normal assessment    Feeding assessment: latch difficulty (due to positioning)  LATCH:  Latch: Repeated attempts, hold nipple in mouth, stimulate to suck   Audible Swallowing: A few with stimulation   Type of Nipple: Everted (After stimulation)   Comfort (Breast/Nipple): Soft/non-tender   Hold (Positioning): Full assist, staff holds infant at breast   LATCH Score: 6         Feeding recommendations:  breast feed on demand. Mom desires to breast feed. Ed. On how est. Milk supply.     Mom has a evenflow pump from ins.    Demonstration on how to est. Milk supply    Demonstration on positioning up to the breast. Brought baby up to both breasts in football hold. Hands on demonstration - enc. Mom to allow baby to take the nipple and the areola into her mouth.       Mom:  Breast: large, pendulous breasts with large, dark areolas and visible sherman glandes  Nipple Assessment in General: large, round, raúl  Mother's Awareness of Feeding Cues                 Recognizes: learning                  Verbalizes: No  Support System: FOB  History of Breastfeeding: first child    Pattison Latch After Lactation Education/Consult:  Efficiency: Football on both breasts - due to c/s, spitty after birth. Demonstration and teach back of snug hold. Since baby did not latch deeply,hand expressed and cup fed baby 1-2 mls of colostrum from both breasts. Minimal sucks at the breast noted.                   Lips Flanged: Yes, when mom is holding the areola, not the base of the nipple              Depth of latch: deeper if mom compresses areola              Audible Swallow: No              Visible Milk: Yes, with HE              Wide Open/ Asymmetrical: Yes              Suck Swallow Cycle: Breathing: yes, Coordinated:  some  Nipple Assessment after latch: Normal: elongated/eraser, no discoloration and no damage noted.  Latch Problems: alignment - positioning;     Parents had virtual prenatal ed.    Position After Lactation Education/Consult:  Infant's Ergonomics/Body               Body Alignment: Yes, with demonstration               Head Supported: Yes, snug hold with demonstration and consistent teach back               Close to Mom's body/ Lifted/ Supported: Yes, snug hold demonstrated               Mom's Ergonomics/Body: Yes                           Supported: Yes, with pillows to lift the breast and the baby                           Sitting Back: Yes                           Brings Baby to her breast: Yes, with nipple to nose, chin to breast - wide mouth  Positioning Problems: Alignment; U shape hold of the breast enc. Enc. To snug hold and discussed early feeding cues. Baby is spitty after birth with c/s    Feeding Plan:     Education of breastfeeding with large breasts. Demonstration and teach back of positioning and alignment. Use pillows, tables, rolled towels/blankets to lift breast. Lift baby up to breast level. Education on hand expression prior to latch, positioning of hand to compress the breast, and positioning and alignment of baby for deep latch with large breasts.       Early Feeding Cues:     First feeding cues include:  - a change in the baby's breathing pattern,   - sighing,   - rapid eye movement without opening his/her eyelids, or   - any early feeding cue signs located on the back of the Feeding log,  Begin hand expression and set yourself up to bring the baby up to the breast.     If your baby is getting close to the time that a feeding should begin:   - undress your baby and bring your baby skin to skin,   - have your baby's head in between your breasts, head turned to the side, so your baby's ear is listening to your heart.     Once your baby begins to stir, place your baby in a breastfeeding position  that is comfortable and support your baby up to the breast. Always bring your baby to the breast, never the breast to your baby.    Education on positioning and alignment. Mom is encouraged to:     - Bring baby up to the breast (use of pillows to elevate so baby's torso is against mom's breasts)   - Skin to skin for feedings with top hand exposed to show signs of satiation   - Chin deep into breast tissue (make baby look up to the nipple)   - nose aligned to the nipple   -Wait for wide gape, place chin behind the areola on the breast so nipple is at the nose. Once baby opens their mouth wide, the nipple will be aimed at the upper, back palate  - Cheeks should be touching breast, and baby should have a long neck   - Ear, shoulder, hip will be in alignment   - Deep, snug hold of baby up to the breast   - Every baby knows how to breathe at the breast. The tip of the nose may appear to touch the breast. Babies breathe from the side of the nasal passages. If baby can not breathe due to improper alignment, baby will unlatch    Demonstrated with teach back breast compressions during a feeding to increase milk transfer and stimulate suckling after a breathing/muscle break.     Education on alternative feeding methods. Demonstration and teach back of (cup). Baby took 1-2 mls of expressed colostrum. Encouraged to call lactation for additional assistance with feedings.      (Scan QR code for [employer] - positions)   Review [employer] on youtube or scan QR code for [employer] video      [employer]        [employer] - positions      Christy Hume, MA 5/29/2025 11:13 AM

## 2025-05-29 NOTE — OB LABOR/OXYTOCIN SAFETY PROGRESS
Oxytocin Safety Progress Check Note - Yu Ariza 34 y.o. female MRN: [id number]    Unit/Bed#: -01 Encounter: [id number]    Dose (anshul-units/min) Oxytocin: 8 anshul-units/min  Contraction Frequency (minutes): 1-2  Contraction Intensity: Moderate  Uterine Activity Characteristics: Regular  Cervical Dilation: 8        Cervical Effacement: 90  Fetal Station: 0  Baseline Rate (FHR): 140 bpm  Fetal Heart Rate (FHT): 150 BPM  FHR Category: I  Oxytocin Safety Progress Check: Safety check completed  Provider Notified: Yes  Provider Name: Dr. Pippa Garcia    Vital Signs:   Vitals:    25 2143   BP: 121/64   Pulse: 78   Resp:    Temp:    SpO2:      Notes/comments:   Pt resting comfortably. FHT Category I. [place] with contractions q1-2min. SVE as above. Pit running at 8, will maintain at current rate or decrease for tachysystole. Will recheck in 2 hours or sooner if clinically indicated. Anticipate .     Patient now meets criteria for preeclampsia without severe features. Discussed diagnosis and instructed patient to report symptoms of headache unresolved with Tylenol, visual disturbance, or RUQ pain.    Attending physician Dr. Nate Garcia MD 2025 9:54 PM

## 2025-05-29 NOTE — ASSESSMENT & PLAN NOTE
PreE w/o SF: Plt 125, P:C 0.5, CMP wnl    BP monitoring    Systolic (12hrs), Av , Min:120 , Max:135   Diastolic (12hrs), Av, Min:64, Max:66

## 2025-05-29 NOTE — UTILIZATION REVIEW
Initial Clinical Review    Admission: Date/Time/Statement:   Admission Orders (From admission, onward)       Ordered        25  Inpatient Admission  Once                          Orders Placed This Encounter   Procedures    Inpatient Admission     Standing Status:   Standing     Number of Occurrences:   1     Level of Care:   Med Surg [16]     Estimated length of stay:   More than 2 Midnights     Certification:   I certify that inpatient services are medically necessary for this patient for a duration of greater than two midnights. See H&P and MD Progress Notes for additional information about the patient's course of treatment.       Chief Complaint   Patient presents with    Scheduled Induction       Initial Presentation: 34 y.o. female presented to L&D as inpatient admission,  at 39w0d admitted for IOL in the setting of T1DM. Plan continuous external monitoring, IVF,cytotec ashraf balloon, IV pitocin if needed Epidural and IV MGSO 4 if needed and supportive care   SVE: 0/0/-5    25  @0104  Cytotec placed  Contraction Frequency (minutes): x2  Contraction Intensity: Mild  Uterine Activity Characteristics: Irritability  Cervical Dilation: Closed  Cervical Effacement: 0  Fetal Station: Floating  Baseline Rate (FHR): 125 bpm  Fetal Heart Rate (FHT): 145 BPM  FHR Category: I    @ 0527   Cytotec # 2 placed  Contraction Frequency (minutes): 2-4  Contraction Intensity: Mild  Uterine Activity Characteristics: Irregular  Cervical Dilation: Closed  Cervical Effacement: 0  Fetal Station: Floating  Baseline Rate (FHR): 130 bpm  Fetal Heart Rate (FHT): 135 BPM  FHR Category: I    @ 1116  Ashraf balloon inserted   Contraction Frequency (minutes): 2-3  Contraction Intensity: Mild/Moderate  Uterine Activity Characteristics: Irregular  Cervical Dilation: 1  Cervical Effacement: 50  Fetal Station: -3  Baseline Rate (FHR): 140 bpm  Fetal Heart Rate (FHT): 140 BPM  FHR Category: 1    @ 1555  Epidural placed    @  174  [place] balloon expelled  SROM blood tinged fluid  IV Pitocin started   Dose (anshul-units/min) Oxytocin: 8 anshul-units/min  Contraction Frequency (minutes): 1.5-2  Contraction Intensity: Moderate  Uterine Activity Characteristics: Regular  Cervical Dilation: 3-4  Cervical Effacement: 80  Fetal Station: -2  Baseline Rate (FHR): 140 bpm  Fetal Heart Rate (FHT): 147 BPM  FHR Category: II    @ 2154  Dose (anshul-units/min) Oxytocin: 8 anshul-units/min  Contraction Frequency (minutes): 1-2  Contraction Intensity: Moderate  Uterine Activity Characteristics: Regular  Cervical Dilation: 8  Cervical Effacement: 90  Fetal Station: 0  Baseline Rate (FHR): 140 bpm  Fetal Heart Rate (FHT): 150 BPM  FHR Category: I    @ 2228  Dose (anshul-units/min) Oxytocin: 8 anshul-units/min  Contraction Frequency (minutes): 2-3  Contraction Intensity: Moderate  Uterine Activity Characteristics: Regular  Cervical Dilation: Lip/rim (Comment)  Cervical Effacement: 100  Fetal Station: 0  Baseline Rate (FHR): 140 bpm  Fetal Heart Rate (FHT): 68 BPM  FHR Category: II    25  @ 0021  Dose (anshul-units/min) Oxytocin: 0 anshul-units/min (Per Verbal Orders From Dr. Kimberley Sullivan)  Contraction Frequency (minutes): 4.5-5  Contraction Intensity: Moderate  Uterine Activity Characteristics: Regular  Cervical Dilation: 10  Dilation Complete Date: 25  Dilation Complete Time:   Cervical Effacement: 100  Fetal Station: 1  Patient pushing for over an hour with good maternal effort. However, her pubic arch is narrow, and the fetal head has not made any descent over that time.     OR   @ 39 W 2 D  FEMALE @ 0056  APGAR 9  9  WT  4205 GRAMS   Infant dried suctioned stimulated and warmed. Responded well and taken to NBN       Scheduled Medications:  acetaminophen, 650 mg, Oral, Q6H JOSÉ ANTONIO  cephalexin, 500 mg, Oral, Q6H JOSÉ ANTONIO  enoxaparin, 40 mg, Subcutaneous, Q12H JOSÉ ANTONIO  ketorolac, 15 mg, Intravenous, Q6H JOSÉ ANTONIO   Followed by  [START ON 2025]  ibuprofen, 600 mg, Oral, Q6H  metroNIDAZOLE, 500 mg, Oral, Q12H JOSÉ ANTONIO      Continuous IV Infusions:  dextrose 5 % and sodium chloride 0.9 %, 50 mL/hr, Intravenous, Continuous  insulin regular (HumuLIN R,NovoLIN R) 1 Units/mL in sodium chloride 0.9 % 100 mL infusion, 0.2-4 Units/hr, Intravenous, Continuous  lactated ringers, 75 mL/hr, Intravenous, Continuous      PRN Meds:  benzocaine-menthol-lanolin-aloe, 1 Application, Topical, Q6H PRN  calcium carbonate, 1,000 mg, Oral, TID PRN  diphenhydrAMINE, 25 mg, Intravenous, Q6H PRN  fentaNYL, 25 mcg, Intravenous, Q5 Min PRN  hydrocortisone, 1 Application, Topical, Daily PRN  HYDROmorphone, 0.5 mg, Intravenous, Q2H PRN  metoclopramide, 10 mg, Intravenous, Once PRN  nalbuphine, 5 mg, Intravenous, Q3H PRN  naloxone, 0.1 mg, Intravenous, Q3 min PRN  ondansetron, 4 mg, Intravenous, Once PRN  ondansetron, 4 mg, Intravenous, Q8H PRN  [START ON 5/30/2025] oxyCODONE, 10 mg, Oral, Q4H PRN  [START ON 5/30/2025] oxyCODONE, 5 mg, Oral, Q4H PRN  simethicone, 80 mg, Oral, 4x Daily PRN  witch hazel-glycerin, 1 Pad, Topical, Q4H PRN      ED Triage Vitals [05/27/25 2114]   Temperature Pulse Respirations Blood Pressure SpO2 Pain Score   98.5 °F (36.9 °C) 82 18 130/60 97 % No Pain     Weight (last 2 days)       Date/Time Weight    05/28/25 2254 --    Comment rows:    OBSERV: Dr. Pippa Clarke At Bedside Evaluating at 05/28/25 2254 05/28/25 2219 --    Comment rows:    OBSERV: Dr. Pippa Clarke At Bedside Evaluating at 05/28/25 2219 05/28/25 2149 --    Comment rows:    OBSERV: Dr. Pippa Clarke At Bedside Evaluating at 05/28/25 2149 05/28/25 1744 --    Comment rows:    OBSERV: dr clarke at bedside for sve, clear/bloody fluid trickling from vagina prior to SVE at 05/28/25 1744 05/27/25 2114 128 (281.8)            Vital Signs (last 3 days)       Date/Time Temp Pulse Resp BP MAP (mmHg) SpO2 O2 Device Cardiac (WDL) Patient Position - Orthostatic VS Pain    05/29/25  1353 -- -- -- -- -- -- -- -- -- No Pain    05/29/25 1219 -- -- -- -- -- -- -- -- -- 2    05/29/25 1206 -- -- -- -- -- -- -- -- -- No Pain    05/29/25 1203 97.8 °F (36.6 °C) 90 18 135/86 96 98 % None (Room air) -- Lying --    05/29/25 0812 98.2 °F (36.8 °C) 81 18 142/83 -- 96 % None (Room air) -- Sitting No Pain    05/29/25 0739 -- -- -- -- -- -- None (Room air) WDL -- No Pain    05/29/25 0634 98.3 °F (36.8 °C) 66 18 119/87 -- -- -- -- Lying No Pain    05/29/25 0534 98.4 °F (36.9 °C) 87 17 119/71 -- 96 % None (Room air) -- Sitting No Pain    05/29/25 0525 -- -- -- -- -- -- -- -- -- No Pain    05/29/25 0430 98.4 °F (36.9 °C) 81 18 120/72 -- 96 % None (Room air) -- Sitting No Pain    05/29/25 0330 98 °F (36.7 °C) 80 17 126/66 -- 98 % None (Room air) -- Sitting 4    05/29/25 0312 -- 90 -- -- -- -- -- -- -- --    05/29/25 0311 -- 83 -- -- -- 92 % -- -- -- --    05/29/25 0310 -- 82 -- -- -- 92 % -- -- -- --    05/29/25 0309 -- 89 -- -- -- 92 % -- -- -- --    05/29/25 0308 -- 92 -- -- -- 92 % -- -- -- --    05/29/25 0307 -- 76 -- -- -- 91 % -- -- -- --    05/29/25 0306 -- 75 -- -- -- 91 % -- -- -- --    05/29/25 0305 -- 80 -- -- -- 92 % -- -- -- --    05/29/25 0304 -- 78 -- -- -- 92 % -- -- -- --    05/29/25 0303 -- 75 -- -- -- 92 % -- -- -- --    05/29/25 0302 -- 77 -- -- -- 92 % -- -- -- --    05/29/25 0301 -- 87 -- -- -- 92 % -- -- -- --    05/29/25 0300 99.1 °F (37.3 °C) 82 18 127/67 90 95 % None (Room air) WDL Lying 4    05/29/25 0259 -- 84 -- -- -- 93 % -- -- -- --    05/29/25 0258 -- 81 -- -- -- 94 % -- -- -- --    05/29/25 0257 -- 83 -- -- -- 93 % -- -- -- --    05/29/25 0256 -- 83 -- -- -- 93 % -- -- -- --    05/29/25 0255 -- 85 -- -- -- 93 % -- -- -- --    05/29/25 0254 -- 86 -- -- -- 93 % -- -- -- --    05/29/25 0253 -- 85 -- -- -- 93 % -- -- -- --    05/29/25 0252 -- 89 -- -- -- 93 % -- -- -- --    05/29/25 0251 -- 89 -- -- -- 93 % -- -- -- --    05/29/25 0250 -- 85 -- -- -- 93 % -- -- -- --    05/29/25 0249 --  91 -- -- -- 93 % -- -- -- --    05/29/25 0248 -- 91 -- -- -- 93 % -- -- -- --    05/29/25 0247 -- 92 -- -- -- 93 % -- -- -- --    05/29/25 0246 -- 97 -- -- -- 94 % -- -- -- --    05/29/25 0245 -- 93 18 130/78 92 94 % None (Room air) WDL Lying 4    05/29/25 0244 -- 91 -- -- -- 94 % -- -- -- --    05/29/25 0243 -- 90 -- -- -- 94 % -- -- -- --    05/29/25 0242 -- 90 -- -- -- 94 % -- -- -- --    05/29/25 0241 -- 85 -- -- -- 95 % -- -- -- --    05/29/25 0240 -- 86 -- -- -- 96 % -- -- -- --    05/29/25 0239 -- 84 -- -- -- 96 % -- -- -- --    05/29/25 0238 -- 83 -- -- -- 96 % -- -- -- --    05/29/25 0237 -- 89 -- -- -- 96 % -- -- -- --    05/29/25 0236 -- 95 -- -- -- 96 % -- -- -- --    05/29/25 0235 -- 94 -- -- -- 96 % -- -- -- --    05/29/25 0234 -- 83 -- -- -- 96 % -- -- -- --    05/29/25 0233 -- 84 -- -- -- 96 % -- -- -- --    05/29/25 0232 -- 85 -- -- -- 96 % -- -- -- --    05/29/25 0231 -- 85 -- -- -- 96 % -- -- -- --    05/29/25 0230 99.2 °F (37.3 °C) 84 18 134/77 93 97 % None (Room air) WDL Lying 5    05/29/25 0229 -- 105 -- -- -- 95 % -- -- -- --    05/29/25 0228 -- 83 -- -- -- 96 % -- -- -- --    05/29/25 0227 -- 82 -- -- -- 95 % -- -- -- --    05/29/25 0226 -- 81 -- -- -- 95 % -- -- -- --    05/29/25 0225 -- 77 -- -- -- 94 % -- -- -- --    05/29/25 0224 -- 83 -- -- -- 95 % -- -- -- --    05/29/25 0223 -- 80 -- -- -- 94 % -- -- -- --    05/29/25 0222 -- 76 -- -- -- 96 % -- -- -- --    05/29/25 0221 -- 76 -- -- -- 96 % -- -- -- --    05/29/25 0220 -- 78 -- -- -- 95 % -- -- -- --    05/29/25 0219 -- 81 -- -- -- 95 % -- -- -- --    05/29/25 0218 -- 83 -- -- -- 95 % -- -- -- --    05/29/25 0217 -- 92 -- -- -- 95 % -- -- -- --    05/29/25 0216 99.2 °F (37.3 °C) 94 18 127/63 89 97 % None (Room air) WDL Lying 6    05/29/25 0215 -- 84 -- -- -- 97 % -- -- -- --    05/29/25 0214 -- 79 -- -- -- 97 % -- -- -- --    05/29/25 0213 -- 76 -- -- -- 97 % -- -- -- --    05/29/25 0212 -- 79 -- -- -- 98 % -- -- -- --    05/29/25 0211  -- 81 -- -- -- 98 % -- -- -- --    05/29/25 0210 -- 79 -- -- -- 98 % -- -- -- --    05/29/25 0209 -- 80 -- -- -- 97 % -- -- -- --    05/29/25 0208 -- 81 -- -- -- 97 % -- -- -- --    05/29/25 0207 -- 81 -- -- -- 97 % -- -- -- --    05/29/25 0206 -- 81 -- -- -- 98 % -- -- -- --    05/29/25 0205 -- 79 -- -- -- 97 % -- -- -- --    05/29/25 0204 -- 82 -- -- -- 97 % -- -- -- --    05/29/25 0203 -- 84 -- -- -- 98 % -- -- -- --    05/29/25 0202 -- 86 -- -- -- 98 % -- -- -- --    05/29/25 0201 -- 85 -- -- -- 97 % -- -- -- --    05/29/25 0200 98.1 °F (36.7 °C) 87 18 115/55 79 98 % None (Room air) WDL Lying 8    05/29/25 0159 -- 88 -- -- -- 98 % -- -- -- --    05/29/25 0158 98.1 °F (36.7 °C) 89 18 119/55 79 98 % None (Room air) WDL Lying 8    05/29/25 0015 -- 116 -- 138/66 -- -- -- -- -- --    05/29/25 0006 -- 110 -- -- -- 99 % -- -- -- --    05/29/25 0005 -- 115 -- -- -- -- -- -- -- --    05/29/25 0001 -- 107 -- -- -- 99 % -- -- -- --    05/28/25 2358 -- 102 -- 125/70 -- -- -- -- -- --    05/28/25 2357 -- 110 -- -- -- -- -- -- -- --    05/28/25 2356 -- 107 -- -- -- 99 % -- -- -- --    05/28/25 2353 -- 109 -- -- -- -- -- -- -- --    05/28/25 2351 -- 117 -- -- -- 99 % -- -- -- --    05/28/25 2349 -- 107 -- -- -- -- -- -- -- --    05/28/25 2346 -- 104 -- -- -- 99 % -- -- -- --    05/28/25 2345 -- 93 -- -- -- -- -- -- -- --    05/28/25 2341 -- 104 -- -- -- 99 % -- -- -- --    05/28/25 2337 -- 128 -- -- -- -- -- -- -- --    05/28/25 2336 -- 101 -- -- -- 98 % -- -- -- --    05/28/25 2333 -- 100 -- -- -- -- -- -- -- --    05/28/25 2331 -- 96 -- -- -- 98 % -- -- -- --    05/28/25 2329 -- 88 -- 121/58 -- -- -- -- -- --    05/28/25 2326 -- 100 -- -- -- 99 % -- -- -- --    05/28/25 2325 -- 115 -- -- -- -- -- -- -- --    05/28/25 2321 -- 97 -- -- -- 99 % -- -- -- --    05/28/25 2317 -- 101 -- -- -- -- -- -- -- --    05/28/25 2316 -- 104 -- -- -- 100 % -- -- -- --    05/28/25 2314 -- 92 -- 106/56 -- -- -- -- -- --    05/28/25 2313 -- 97  -- -- -- -- -- -- -- --    05/28/25 2311 -- 95 -- -- -- 98 % -- -- -- --    05/28/25 2309 -- 111 -- -- -- -- -- -- -- --    05/28/25 2306 -- 75 -- -- -- 98 % -- -- -- --    05/28/25 2258 -- 99 -- 133/72 -- -- -- -- -- --    05/28/25 2254 -- -- -- -- -- -- -- -- -- --    OBSERV: Dr. Pippa Clarke At Bedside Evaluating at 05/28/25 2254 05/28/25 2243 -- 89 -- 132/60 -- -- -- -- -- --    05/28/25 2228 -- 91 -- 118/58 -- -- -- -- -- --    05/28/25 2219 -- -- -- -- -- -- -- -- -- --    OBSERV: Dr. Pippa Clarke At Bedside Evaluating at 05/28/25 2219 05/28/25 2215 99.1 °F (37.3 °C) 89 18 120/69 -- 99 % -- -- Lying --    05/28/25 2158 -- 86 -- 110/61 -- -- -- -- -- --    05/28/25 2149 -- -- -- -- -- -- -- -- -- --    OBSERV: Dr. Pippa Clarke At Bedside Evaluating at 05/28/25 2149 05/28/25 2143 -- 78 -- 121/64 -- -- -- -- -- --    05/28/25 2128 -- 83 -- 122/69 -- -- -- -- -- --    05/28/25 2114 98.8 °F (37.1 °C) 90 18 113/64 -- 99 % -- -- Lying --    05/28/25 2059 -- 90 -- 136/90 -- -- -- -- -- --    05/28/25 2043 -- 76 -- 152/74 -- -- -- -- -- --    05/28/25 2028 -- 74 -- 146/73 -- -- -- -- -- --    05/28/25 2014 98.7 °F (37.1 °C) 83 16 142/68 -- 98 % -- -- Lying --    05/28/25 1958 -- 71 -- 128/74 -- -- -- -- -- --    05/28/25 1943 -- 67 -- 133/77 -- -- -- -- -- No Pain    05/28/25 1929 -- 78 -- 135/79 -- -- -- -- -- --    05/28/25 1913 -- 66 -- 128/71 -- -- -- -- -- --    05/28/25 1900 98.6 °F (37 °C) 71 18 125/60 -- 99 % None (Room air) -- Lying No Pain    05/28/25 1844 -- 69 -- 129/68 -- -- -- -- -- No Pain    05/28/25 1828 -- 70 -- 128/63 -- -- -- -- -- --    05/28/25 1813 -- 73 -- 136/62 -- -- -- -- -- --    05/28/25 1800 98.5 °F (36.9 °C) 80 -- 115/56 -- -- -- -- -- --    05/28/25 1744 -- -- -- -- -- -- -- -- -- --    OBSERV: dr clarke at bedside for sve, clear/bloody fluid trickling from vagina prior to SVE at 05/28/25 1744    05/28/25 1630 -- 73 -- 126/72 -- -- -- -- -- --    05/28/25  1609 -- 94 -- 118/71 -- -- -- -- -- --    05/28/25 1606 -- 87 -- 113/64 -- -- -- -- -- --    05/28/25 1605 -- 85 -- -- -- -- -- -- -- --    05/28/25 1603 -- 76 -- 112/62 -- -- -- -- -- --    05/28/25 1601 99.1 °F (37.3 °C) 83 -- -- -- -- -- -- -- --    05/28/25 1600 -- 76 -- 118/63 -- -- -- -- -- No Pain    05/28/25 1557 -- 86 -- 123/68 -- -- -- -- -- --    05/28/25 1551 -- 93 -- 123/65 -- -- -- -- -- --    05/28/25 1549 -- 93 -- -- -- -- -- -- -- --    05/28/25 1548 -- 91 -- 125/65 -- -- -- -- -- --    05/28/25 1545 -- 94 -- 122/66 -- -- -- -- -- --    05/28/25 1542 -- 90 -- 121/66 -- -- -- -- -- --    05/28/25 1541 -- 93 -- -- -- -- -- -- -- --    05/28/25 1539 -- 97 -- 124/65 -- -- -- -- -- --    05/28/25 1537 -- 95 -- -- -- -- -- -- -- --    05/28/25 1536 -- 88 -- 130/66 -- -- -- -- -- --    05/28/25 1533 -- 99 -- 123/74 -- -- -- -- -- --    05/28/25 1530 -- 93 -- 136/69 -- -- -- -- -- --    05/28/25 1529 -- 88 -- -- -- -- -- -- -- --    05/28/25 1525 -- 73 -- -- -- -- -- -- -- --    05/28/25 1521 -- 88 -- -- -- -- -- -- -- --    05/28/25 1517 -- 100 -- -- -- -- -- -- -- --    05/28/25 1513 -- 99 -- -- -- -- -- -- -- --    05/28/25 1500 -- -- -- -- -- -- -- -- -- 6    05/28/25 1449 -- 98 -- -- -- -- -- -- -- --    05/28/25 1445 -- 99 -- -- -- -- -- -- -- --    05/28/25 1441 -- 101 -- -- -- -- -- -- -- --    05/28/25 1437 -- 99 -- -- -- -- -- -- -- --    05/28/25 1433 -- 93 -- -- -- -- -- -- -- --    05/28/25 1413 -- 81 -- -- -- -- -- -- -- --    05/28/25 1409 -- 82 -- -- -- -- -- -- -- --    05/28/25 1408 -- 82 -- 140/73 -- -- -- -- -- --    05/28/25 1405 -- 87 -- -- -- -- -- -- -- --    05/28/25 1401 -- 80 -- -- -- -- -- -- -- --    05/28/25 1357 -- 89 -- -- -- -- -- -- -- --    05/28/25 1353 -- 93 -- -- -- -- -- -- -- --    05/28/25 1349 -- 86 -- -- -- -- -- -- -- --    05/28/25 1345 -- 84 -- -- -- -- -- -- -- --    05/28/25 1341 -- 80 -- -- -- -- -- -- -- --    05/28/25 1337 -- 72 -- -- -- -- -- -- -- --     05/28/25 1333 -- 74 -- -- -- -- -- -- -- --    05/28/25 1329 -- 94 -- -- -- -- -- -- -- --    05/28/25 1325 -- 70 -- -- -- -- -- -- -- --    05/28/25 1321 -- 90 -- -- -- -- -- -- -- --    05/28/25 1317 -- 96 -- -- -- -- -- -- -- --    05/28/25 1313 -- 94 -- -- -- -- -- -- -- --    05/28/25 1309 -- 100 -- -- -- -- -- -- -- --    05/28/25 1305 -- 85 -- 137/75 -- -- -- -- -- --    05/28/25 1303 98.2 °F (36.8 °C) -- 16 -- -- -- -- -- -- --    05/28/25 1301 -- 80 -- -- -- -- -- -- -- --    05/28/25 1257 -- 104 -- -- -- -- -- -- -- --    05/28/25 1253 -- 99 -- -- -- -- -- -- -- --    05/28/25 1249 -- 103 -- -- -- -- -- -- -- --    05/28/25 1245 -- 92 -- -- -- -- -- -- -- --    05/28/25 1241 -- 105 -- -- -- -- -- -- -- --    05/28/25 1237 -- 101 -- -- -- -- -- -- -- 4    05/28/25 1233 -- 94 -- -- -- -- -- -- -- --    05/28/25 1229 -- 110 -- -- -- -- -- -- -- --    05/28/25 1225 -- 101 -- -- -- -- -- -- -- --    05/28/25 1221 -- 103 -- -- -- -- -- -- -- --    05/28/25 1217 -- 111 -- -- -- -- -- -- -- --    05/28/25 1213 -- 100 -- -- -- -- -- -- -- --    05/28/25 1209 -- 95 -- -- -- -- -- -- -- --    05/28/25 1205 -- 101 -- -- -- -- -- -- -- --    05/28/25 1201 -- 101 -- -- -- -- -- -- -- --    05/28/25 1157 -- 98 -- -- -- -- -- -- -- --    05/28/25 1153 -- 98 -- -- -- -- -- -- -- --    05/28/25 1149 -- 103 -- -- -- -- -- -- -- --    05/28/25 1145 -- 99 -- -- -- -- -- -- -- --    05/28/25 1141 -- 93 -- -- -- -- -- -- -- --    05/28/25 1137 -- 109 -- -- -- -- -- -- -- --    05/28/25 1133 -- 109 -- -- -- -- -- -- -- --    05/28/25 1129 -- 94 -- -- -- -- -- -- -- --    05/28/25 1125 -- 92 -- -- -- -- -- -- -- --    05/28/25 1121 -- 89 -- -- -- -- -- -- -- --    05/28/25 1117 -- 89 -- -- -- -- -- -- -- --    05/28/25 1113 -- 80 -- -- -- -- -- -- -- --    05/28/25 1109 -- 85 -- -- -- -- -- -- -- --    05/28/25 1105 -- 83 -- -- -- -- -- -- -- --    05/28/25 1104 -- 71 -- 131/84 -- -- -- -- -- --    05/28/25 1101 -- 78 -- -- -- --  -- -- -- --    05/28/25 1057 -- 83 -- -- -- -- -- -- -- --    05/28/25 0954 -- 71 -- 139/84 -- -- -- -- -- --    05/28/25 0925 -- 81 -- 131/77 -- -- -- -- -- --    05/28/25 0727 97.8 °F (36.6 °C) -- 16 -- -- -- -- -- -- 1    05/28/25 0653 -- 71 -- 139/80 -- -- -- -- -- --    05/28/25 0639 -- 77 16 129/74 -- -- -- -- -- --    05/28/25 0624 -- 67 16 119/71 -- -- -- -- -- --    05/28/25 0610 -- 70 16 121/75 -- -- -- -- -- --    05/28/25 0600 97.9 °F (36.6 °C) -- -- -- -- -- -- -- -- --    05/28/25 0553 -- 65 16 134/68 -- -- -- -- -- --    05/28/25 0545 -- -- -- -- -- -- -- -- -- 1 05/28/25 0540 -- 64 18 132/74 -- -- -- -- -- --    05/28/25 0530 -- 64 18 136/75 -- -- -- -- -- --    05/28/25 0445 -- 68 16 133/79 -- -- -- -- -- --    05/28/25 0400 97.9 °F (36.6 °C) 86 18 109/51 -- -- -- -- -- --    05/28/25 0345 -- 78 16 114/64 -- -- -- -- -- --    05/28/25 0330 -- 64 16 109/60 -- -- -- -- -- 1    05/28/25 0315 -- 68 16 119/59 -- -- -- -- -- --    05/28/25 0300 -- 73 -- 133/82 -- -- -- -- -- --    05/28/25 0230 -- 72 16 138/74 -- -- -- -- -- --    05/28/25 0215 -- 82 16 136/80 -- -- -- -- -- --    05/28/25 0145 -- 71 16 147/80 -- -- -- -- -- --    05/28/25 0130 -- 71 -- 139/86 -- -- -- -- -- --    05/28/25 0115 -- 67 16 142/77 -- -- -- -- -- --    05/28/25 0100 97.8 °F (36.6 °C) 75 16 140/85 -- 98 % -- WDL Lying No Pain    05/27/25 2114 98.5 °F (36.9 °C) 82 18 130/60 -- 97 % -- -- Sitting No Pain              Pertinent Labs/Diagnostic Test Results:     Results from last 7 days   Lab Units 05/29/25  0939 05/27/25 2042   WBC Thousand/uL 13.62* 8.81   HEMOGLOBIN g/dL 11.3* 12.9   HEMATOCRIT % 32.9* 37.0   PLATELETS Thousands/uL 109* 125*         Results from last 7 days   Lab Units 05/27/25 2042   SODIUM mmol/L 136   POTASSIUM mmol/L 4.0   CHLORIDE mmol/L 106   CO2 mmol/L 21   ANION GAP mmol/L 9   BUN mg/dL 16   CREATININE mg/dL 0.55*   EGFR ml/min/1.73sq m 124   CALCIUM mg/dL 8.7     Results from last 7 days   Lab  Units 25  2042   AST U/L 22   ALT U/L 21   ALK PHOS U/L 143*   TOTAL PROTEIN g/dL 6.4   ALBUMIN g/dL 3.3*   TOTAL BILIRUBIN mg/dL 0.25     Results from last 7 days   Lab Units 25  1304 25  1206 25  1135 25  1027 25  0917 25  0811 25  0706 25  0603 25  0503 25  0400 25  0258 25  0204   POC GLUCOSE mg/dl 113 71 74 92 202* 195* 93 81 91 103 110 110     Results from last 7 days   Lab Units 25  2042   GLUCOSE RANDOM mg/dL 80             Beta- Hydroxybutyrate   Date Value Ref Range Status   2024 11.12 (H) 0.02 - 0.27 mmol/L Final      Results from last 7 days   Lab Units 25  0157 25  0912   GLUCOSE UA   --  neg   PROTEIN UA   --  1   CREATININE UR mg/dL 105.5  --    PROTEIN UR mg/dL 49.9  --    PROT/CREAT RATIO UR  0.5*  --        Past Medical History[1]  Present on Admission:   Type 1 diabetes mellitus on insulin therapy (HCC)   Uterine fibroid in pregnancy      Admitting Diagnosis: Encounter for elective induction of labor [Z34.90]  Encounter for  delivery without indication [O82]  Age/Sex: 34 y.o. female    Network Utilization Review Department  ATTENTION: Please call with any questions or concerns to [phone] and carefully listen to the prompts so that you are directed to the right person. All voicemails are confidential.   For Discharge needs, contact Care Management DC Support Team at [phone] opt. 2  Send all requests for admission clinical reviews, approved or denied determinations and any other requests to dedicated fax number below belonging to the campus where the patient is receiving treatment. List of dedicated fax numbers for the Facilities:  FACILITY NAME UR FAX NUMBER   ADMISSION DENIALS (Administrative/Medical Necessity) [phone]   DISCHARGE SUPPORT TEAM (NETWORK) [phone]   PARENT CHILD HEALTH (Maternity/NICU/Pediatrics) [phone]   General acute hospital  [phone]   [facility] [phone]   [facility] [phone]   [facility] [phone]   [facility] [phone]   General acute hospital [phone]   [facility] [phone]   [facility] [phone]   [facility] [phone]   [facility] [phone]   [facility] [phone]   [facility] [phone]              [1]   Past Medical History:  Diagnosis Date    Chlamydia 2014    Diabetes mellitus (HCC) 2/27/24    Admitted to the hospital    Type 1 diabetes mellitus (HCC) 02/27/2024    DKA on admission    Varicella     had disease and vaccines

## 2025-05-30 LAB
GLUCOSE SERPL-MCNC: 118 MG/DL (ref 65–140)
GLUCOSE SERPL-MCNC: 66 MG/DL (ref 65–140)
GLUCOSE SERPL-MCNC: 67 MG/DL (ref 65–140)
GLUCOSE SERPL-MCNC: 79 MG/DL (ref 65–140)
GLUCOSE SERPL-MCNC: 90 MG/DL (ref 65–140)

## 2025-05-30 PROCEDURE — 99254 IP/OBS CNSLTJ NEW/EST MOD 60: CPT

## 2025-05-30 PROCEDURE — 82948 REAGENT STRIP/BLOOD GLUCOSE: CPT

## 2025-05-30 PROCEDURE — 99024 POSTOP FOLLOW-UP VISIT: CPT | Performed by: OBSTETRICS & GYNECOLOGY

## 2025-05-30 RX ADMIN — ACETAMINOPHEN 650 MG: 325 TABLET, FILM COATED ORAL at 12:21

## 2025-05-30 RX ADMIN — IBUPROFEN 600 MG: 600 TABLET, FILM COATED ORAL at 08:19

## 2025-05-30 RX ADMIN — CEPHALEXIN 500 MG: 250 CAPSULE ORAL at 09:20

## 2025-05-30 RX ADMIN — METRONIDAZOLE 500 MG: 500 TABLET ORAL at 20:10

## 2025-05-30 RX ADMIN — ACETAMINOPHEN 650 MG: 325 TABLET, FILM COATED ORAL at 02:18

## 2025-05-30 RX ADMIN — CEPHALEXIN 500 MG: 250 CAPSULE ORAL at 20:10

## 2025-05-30 RX ADMIN — ACETAMINOPHEN 650 MG: 325 TABLET, FILM COATED ORAL at 18:07

## 2025-05-30 RX ADMIN — IBUPROFEN 600 MG: 600 TABLET, FILM COATED ORAL at 22:18

## 2025-05-30 RX ADMIN — METRONIDAZOLE 500 MG: 500 TABLET ORAL at 08:51

## 2025-05-30 RX ADMIN — KETOROLAC TROMETHAMINE 15 MG: 30 INJECTION, SOLUTION INTRAMUSCULAR; INTRAVENOUS at 02:18

## 2025-05-30 RX ADMIN — ACETAMINOPHEN 650 MG: 325 TABLET, FILM COATED ORAL at 06:23

## 2025-05-30 RX ADMIN — IBUPROFEN 600 MG: 600 TABLET, FILM COATED ORAL at 15:48

## 2025-05-30 RX ADMIN — INSULIN GLARGINE 18 UNITS: 100 INJECTION, SOLUTION SUBCUTANEOUS at 22:15

## 2025-05-30 RX ADMIN — CEPHALEXIN 500 MG: 250 CAPSULE ORAL at 15:48

## 2025-05-30 RX ADMIN — CEPHALEXIN 500 MG: 250 CAPSULE ORAL at 02:25

## 2025-05-30 NOTE — ASSESSMENT & PLAN NOTE
Lab Results   Component Value Date    HGBA1C 6.0 (H) 03/26/2025       Recent Labs     05/29/25  2242 05/29/25  2342 05/30/25  0046 05/30/25  0711   POCGLU 82 82 79 67       Blood Sugar Average: Last 72 hrs:  (P) 91.68

## 2025-05-30 NOTE — QUICK NOTE
Was hypoglycemic at 53, improved to 82. Evaluated at bedside. Patient is comfortable, pleasant, eating snack. Alert, oriented x4. Patient said felt hot earlier but now does not have that symptom. Denies other hypoglycemic symptoms.    Will draw POCT glucose in 30 minutes to re-evaluate. Discussed with Dr. Hung    Addendum: Discussed again with Dr. Hung, Repeat BG 82 again. Will advise another snack & POCT glucose in AM. Unless patient becomes symptomatic.    Discussed with Dr. Hung

## 2025-05-30 NOTE — PLAN OF CARE
Problem: PAIN - ADULT  Goal: Verbalizes/displays adequate comfort level or baseline comfort level  Description: Interventions:  - Encourage patient to monitor pain and request assistance  - Assess pain using appropriate pain scale  - Administer analgesics as ordered based on type and severity of pain and evaluate response  - Implement non-pharmacological measures as appropriate and evaluate response  - Consider cultural and social influences on pain and pain management  - Notify physician/advanced practitioner if interventions unsuccessful or patient reports new pain  - Educate patient/family on pain management process including their role and importance of  reporting pain   - Provide non-pharmacologic/complimentary pain relief interventions  Outcome: Progressing     Problem: INFECTION - ADULT  Goal: Absence or prevention of progression during hospitalization  Description: INTERVENTIONS:  - Assess and monitor for signs and symptoms of infection  - Monitor lab/diagnostic results  - Monitor all insertion sites, i.e. indwelling lines, tubes, and drains  - Monitor endotracheal if appropriate and nasal secretions for changes in amount and color  - Slinger appropriate cooling/warming therapies per order  - Administer medications as ordered  - Instruct and encourage patient and family to use good hand hygiene technique  - Identify and instruct in appropriate isolation precautions for identified infection/condition  Outcome: Progressing  Goal: Absence of fever/infection during neutropenic period  Description: INTERVENTIONS:  - Monitor WBC  - Perform strict hand hygiene  - Limit to healthy visitors only  - No plants, dried, fresh or silk flowers with velasquez in patient room  Outcome: Progressing     Problem: SAFETY ADULT  Goal: Patient will remain free of falls  Description: INTERVENTIONS:  - Educate patient/family on patient safety including physical limitations  - Instruct patient to call for assistance with activity   -  Consider consulting OT/PT to assist with strengthening/mobility based on AM PAC & JH-HLM score  - Consult OT/PT to assist with strengthening/mobility   - Keep Call bell within reach  - Keep bed low and locked with side rails adjusted as appropriate  - Keep care items and personal belongings within reach  - Initiate and maintain comfort rounds  - Make Fall Risk Sign visible to staff  - Apply yellow socks and bracelet for high fall risk patients  - Consider moving patient to room near nurses station  Outcome: Progressing  Goal: Maintain or return to baseline ADL function  Description: INTERVENTIONS:  -  Assess patient's ability to carry out ADLs; assess patient's baseline for ADL function and identify physical deficits which impact ability to perform ADLs (bathing, care of mouth/teeth, toileting, grooming, dressing, etc.)  - Assess/evaluate cause of self-care deficits   - Assess range of motion  - Assess patient's mobility; develop plan if impaired  - Assess patient's need for assistive devices and provide as appropriate  - Encourage maximum independence but intervene and supervise when necessary  - Involve family in performance of ADLs  - Assess for home care needs following discharge   - Consider OT consult to assist with ADL evaluation and planning for discharge  - Provide patient education as appropriate  - Monitor functional capacity and physical performance, use of AM PAC & JH-HLM   - Monitor gait, balance and fatigue with ambulation    Outcome: Progressing  Goal: Maintains/Returns to pre admission functional level  Description: INTERVENTIONS:  - Perform AM-PAC 6 Click Basic Mobility/ Daily Activity assessment daily.  - Set and communicate daily mobility goal to care team and patient/family/caregiver.   - Record patient progress and toleration of activity level   Outcome: Progressing     Problem: DISCHARGE PLANNING  Goal: Discharge to home or other facility with appropriate resources  Description:  INTERVENTIONS:  - Identify barriers to discharge w/patient and caregiver  - Arrange for needed discharge resources and transportation as appropriate  - Identify discharge learning needs (meds, wound care, etc.)  - Arrange for interpretive services to assist at discharge as needed  - Refer to Case Management Department for coordinating discharge planning if the patient needs post-hospital services based on physician/advanced practitioner order or complex needs related to functional status, cognitive ability, or social support system  Outcome: Progressing     Problem: POSTPARTUM  Goal: Experiences normal postpartum course  Description: INTERVENTIONS:  - Monitor maternal vital signs  - Assess uterine involution and lochia  Outcome: Progressing  Goal: Appropriate maternal -  bonding  Description: INTERVENTIONS:  - Identify family support  - Assess for appropriate maternal/infant bonding   -Encourage maternal/infant bonding opportunities  - Referral to  or  as needed  Outcome: Progressing  Goal: Establishment of infant feeding pattern  Description: INTERVENTIONS:  - Assess breast/bottle feeding  - Refer to lactation as needed  Outcome: Progressing  Goal: Incision(s), wounds(s) or drain site(s) healing without S/S of infection  Description: INTERVENTIONS  - Assess and document dressing, incision, wound bed, drain sites and surrounding tissue  - Provide patient and family education  - Perform skin care/dressing changes every   Outcome: Progressing

## 2025-05-30 NOTE — ASSESSMENT & PLAN NOTE
That is post  on  received neuraxial Duramorph.    Neuraxial Duramorph:     Methadone and Duramorph Intra-procedure Administrations (last 48 hours) Showing orders from other encounters Morphine (Duramorph) may show additional administrations that are not neuraxial. Review route and comments.      Date/Time Action Medication Dose    25 010 Given    morphine (PF) (DURAMORPH) injection 1 mg    25 0106 Given    morphine (PF) (DURAMORPH) injection 1 mg    25 010 Given    morphine (PF) (DURAMORPH) injection 1 mg           Monitoring to continue for 24 hours post Duramorph administration:  Monitor pain, HR, BP, respirations, level of sedation and oxygen levels as ordered  Continuous pulse oximetry (or capnography) for 24 hours.  Notify Anesthesia/Acute Pain Services for the following:  Persistent pruritis, persistent nausea, or if respiratory rate is less than or equal to 10 breaths per minute or if pain is unrelieved or if patient is excessively sedated or O2 Sat less than 90% or EtCO2 outside the parameters of Respiratory Therapy Capnography/EtCO2 policy  No narcotics / sedatives / sleeping agents not ordered by Anesthesia/Acute Pain Service for 24 hours post duramorph    Medication plan:  Tylenol 650 mg every 6 hours scheduled.  IV Toradol 15 mg every 6 hours scheduled 24 hours.  Followed by ibuprofen 600 mg every 6 hours scheduled.  Oxycodone 5 mg every 4 hours as needed for moderate pain.  Oxycodone 10 mg every 4 hours as needed for severe pain  Narcan as needed for respiratory depression/opioid reversal  Benadryl 25 mg IV every 6 hours as needed for itching.  Zofran 4 mg IV every 8 hours as needed for nausea/vomiting

## 2025-05-30 NOTE — CONSULTS
Consultation - Acute Pain   Name: Yu Ariza 34 y.o. female I MRN: [id number]  Unit/Bed#: -01 I Date of Admission: 2025   Date of Service: 2025 I Hospital Day: 3   Inpatient consult to Acute Pain Service  Consult performed by: YEE Orellana  Consult ordered by: Santo Oliveira MD        Physician Requesting Evaluation: Kimberley Sullivan MD   Reason for Evaluation / Principal Problem: duramorph follow-up    Assessment & Plan  Status post primary low transverse  section  That is post  on  received neuraxial Duramorph.    Neuraxial Duramorph:     Methadone and Duramorph Intra-procedure Administrations (last 48 hours) Showing orders from other encounters Morphine (Duramorph) may show additional administrations that are not neuraxial. Review route and comments.      Date/Time Action Medication Dose    25 Given    morphine (PF) (DURAMORPH) injection 1 mg    25 Given    morphine (PF) (DURAMORPH) injection 1 mg    25 Given    morphine (PF) (DURAMORPH) injection 1 mg           Monitoring to continue for 24 hours post Duramorph administration:  Monitor pain, HR, BP, respirations, level of sedation and oxygen levels as ordered  Continuous pulse oximetry (or capnography) for 24 hours.  Notify Anesthesia/Acute Pain Services for the following:  Persistent pruritis, persistent nausea, or if respiratory rate is less than or equal to 10 breaths per minute or if pain is unrelieved or if patient is excessively sedated or O2 Sat less than 90% or EtCO2 outside the parameters of Respiratory Therapy Capnography/EtCO2 policy  No narcotics / sedatives / sleeping agents not ordered by Anesthesia/Acute Pain Service for 24 hours post duramorph    Medication plan:  Tylenol 650 mg every 6 hours scheduled.  IV Toradol 15 mg every 6 hours scheduled 24 hours.  Followed by ibuprofen 600 mg every 6 hours scheduled.  Oxycodone 5 mg every 4 hours as needed  for moderate pain.  Oxycodone 10 mg every 4 hours as needed for severe pain  Narcan as needed for respiratory depression/opioid reversal  Benadryl 25 mg IV every 6 hours as needed for itching.  Zofran 4 mg IV every 8 hours as needed for nausea/vomiting          APS will sign off at this time. Thank you for the consult. All opioids and other analgesics to be written at discretion of primary team. Please contact Acute Pain Service - via SecureChat from [phone] with additional questions or concerns. See SecureChat or Amion for additional contacts and after hours information.    History of Present Illness    HPI: Yu Ariza is a 34 y.o. year old female who presents status post  and received neuraxial Duramorph.    Patient is postop day 1.  Pain has been well-controlled on the current regimen.  Ambulating, voiding, tolerating p.o., denies nausea/vomiting.  No other acute complaints.    Current pain location(s): Pain Score: 1  Pain Location/Orientation: Location: Incision  Pain Scale: Pain Assessment Tool: 0-10    I have reviewed the patient's controlled substance dispensing history in the Prescription Drug Monitoring Program in compliance with the [facility] regulations before prescribing any controlled substances.     Review of Systems   All other systems reviewed and are negative.    Medical History Review: I have reviewed the patient's PMH, PSH, Social History, Family History, Meds, and Allergies     Objective :  Temp:  [97.6 °F (36.4 °C)-98.3 °F (36.8 °C)] 98.1 °F (36.7 °C)  HR:  [78-90] 82  BP: (120-144)/(64-86) 132/74  Resp:  [18-20] 18  SpO2:  [97 %-98 %] 98 %  O2 Device: None (Room air)    Physical Exam    Cardiovascular:      Rate and Rhythm: Normal rate.   Pulmonary:      Effort: Pulmonary effort is normal.   Abdominal:      Tenderness: There is abdominal tenderness.     Musculoskeletal:         General: Normal range of motion.      Cervical back: Normal range of motion.     Neurological:      Mental  Status: She is alert. Mental status is at baseline.     Psychiatric:         Mood and Affect: Mood normal.          Lab Results: I have reviewed the following results:  Estimated Creatinine Clearance: 201.2 mL/min (A) (by C-G formula based on SCr of 0.55 mg/dL (L)).  Lab Results   Component Value Date    WBC 13.62 (H) 05/29/2025    HGB 11.3 (L) 05/29/2025    HCT 32.9 (L) 05/29/2025     (L) 05/29/2025         Component Value Date/Time    K 4.0 05/27/2025 2042     05/27/2025 2042    CO2 21 05/27/2025 2042    BUN 16 05/27/2025 2042    CREATININE 0.55 (L) 05/27/2025 2042         Component Value Date/Time    CALCIUM 8.7 05/27/2025 2042    ALKPHOS 143 (H) 05/27/2025 2042    AST 22 05/27/2025 2042    ALT 21 05/27/2025 2042    TP 6.4 05/27/2025 2042    ALB 3.3 (L) 05/27/2025 2042       Imaging Results Review: No pertinent imaging studies reviewed.  Other Study Results Review: No additional pertinent studies reviewed.

## 2025-05-30 NOTE — PROGRESS NOTES
"Progress Note - OB/GYN   Name: Yu Ariza 34 y.o. female I MRN: [id number]  Unit/Bed#: -01 I Date of Admission: 2025   Date of Service: 2025 I Hospital Day: 3     Assessment & Plan  Status post primary low transverse  section  QBL: 551cc; admission Hb 12.9 >> 11.3  - Continue routine postoperative care  - Pain management with oral analgesics  - DVT Ppx: Lovenox 40mg BID; encourage ambulation  - Passed void trial  - Encourage breastfeeding, familial bonding  Type 1 diabetes mellitus on insulin therapy (HCC)  Lab Results   Component Value Date    HGBA1C 6.0 (H) 2025     Recent Labs     25  22425  2342 25  0046   POCGLU 53* 82 82 79     Blood Sugar Average: Last 72 hrs:  (P) [id number]    T1DM: s/p Endo consult  - Lantus 18-20u qhs, Humalog 6-8u tid w/ meals, SSI    Preeclampsia w/o Severe Features  PreE w/o SF: Plt 125, P:C 0.5, CMP wnl    BP monitoring    Systolic (12hrs), Av , Min:120 , Max:135   Diastolic (12hrs), Av, Min:64, Max:66            Assessment:  Post partum Day #1 s/p 1LTCS(mat. exhaustion), stable, baby in the room.    Subjective/Objective   Chief Complaint:     Post delivery. Patient is doing well. Lochia WNL. Pain well controlled.     Subjective:     Pain: yes, cramping, improved with meds  Tolerating PO: yes  Voiding: yes  Flatus: yes  Ambulating: yes  Chest pain: no  Shortness of breath: no  Leg pain: no  Lochia: minimal    Objective:     Vitals: /66 (BP Location: Right arm)   Pulse 86   Temp 98 °F (36.7 °C) (Oral)   Resp 18   Ht 5' 6\" (1.676 m)   Wt 128 kg (281 lb 12.8 oz)   LMP 2024 (Exact Date)   SpO2 98%   Breastfeeding Yes   BMI 45.48 kg/m²     I/O         05/ 07 07    I.V. (mL/kg) 3843.4 (30)     Total Intake(mL/kg) 3843.4 (30)     Urine (mL/kg/hr) 2150 (0.7) 950 (0.3)    Blood 551     Total Output 2701 950    Net +1142.4 -950                  Lab " Results   Component Value Date    WBC 13.62 (H) 05/29/2025    HGB 11.3 (L) 05/29/2025    HCT 32.9 (L) 05/29/2025    MCV 87 05/29/2025     (L) 05/29/2025       Physical Exam:     Gen: AAOx3, NAD  CV: no acute distress  Lungs: no acute distress  Abd: Soft, non-tender, non-distended, no rebound or guarding  Uterine fundus firm and non-tender, 2 cm below the umbilicus. Incision c/d/I. Mepilex in place and dry.  Ext: Non tender    Griffin Cueva MD  OB GYN PGY1  05/30/25  5:50 AM

## 2025-05-30 NOTE — NURSING NOTE
Pt seen around 2200 for BG check. PCA Clara notified RN of BG being 53. RN at bedside for evaluation. Upon initial assessment, pt denies s/s of hypoglycemia. Pt given 4 oz juice and crackers and insulin drip adjusted per order. MD Pippa Garcia notified.   Per provider, d/c insulin drip, and recheck glucose 30-60 minutes s/p PO snack. Insulin drip D/c'd @ 2225. Pt noted to have become diaphoretic within that timeframe. Denies any other s/s. Pt advised to sit down and given jeni cracker square and peanutbutter per protocol. Pt remained AAOx4, pleasant and able to follow commands. No LOC or pallor noted. BG taken 15 minutes s/p interventions. BG 82. Diaphoresis not present at time of repeat. Provider notified and findings acknowledged.     Approx 15 minutes later, MD Reno requested repeat BG @ 2340 to monitor if glucose drops. Result obtained at requested time with finding of 82. MD Reno notified. Per provider, able to d/c hourly glucose checks and continue with endocrine order of glucometer checks TID before meals and at bedtime and f/u with patient if pt reports becoming symptomatic again. Pt also allowed another snack at this time. Pt given jeni cracker square and peanut butter.     Patient educated on s/s of hypoglycemia and advised to notify staff if symptoms occur. Pt verbalized understanding.

## 2025-05-31 VITALS
BODY MASS INDEX: 45.29 KG/M2 | RESPIRATION RATE: 17 BRPM | HEART RATE: 89 BPM | DIASTOLIC BLOOD PRESSURE: 64 MMHG | SYSTOLIC BLOOD PRESSURE: 137 MMHG | OXYGEN SATURATION: 98 % | TEMPERATURE: 98.2 F | HEIGHT: 66 IN | WEIGHT: 281.8 LBS

## 2025-05-31 LAB
GLUCOSE SERPL-MCNC: 142 MG/DL (ref 65–140)
GLUCOSE SERPL-MCNC: 83 MG/DL (ref 65–140)

## 2025-05-31 PROCEDURE — 99024 POSTOP FOLLOW-UP VISIT: CPT | Performed by: OBSTETRICS & GYNECOLOGY

## 2025-05-31 PROCEDURE — NC001 PR NO CHARGE: Performed by: OBSTETRICS & GYNECOLOGY

## 2025-05-31 PROCEDURE — 82948 REAGENT STRIP/BLOOD GLUCOSE: CPT

## 2025-05-31 RX ORDER — ACETAMINOPHEN 325 MG/1
650 TABLET ORAL EVERY 6 HOURS SCHEDULED
Start: 2025-05-31

## 2025-05-31 RX ORDER — IBUPROFEN 600 MG/1
600 TABLET, FILM COATED ORAL EVERY 6 HOURS
Qty: 30 TABLET | Refills: 0 | Status: SHIPPED | OUTPATIENT
Start: 2025-05-31

## 2025-05-31 RX ADMIN — ACETAMINOPHEN 650 MG: 325 TABLET, FILM COATED ORAL at 08:48

## 2025-05-31 RX ADMIN — INSULIN LISPRO 6 UNITS: 100 INJECTION, SOLUTION INTRAVENOUS; SUBCUTANEOUS at 09:08

## 2025-05-31 RX ADMIN — IBUPROFEN 600 MG: 600 TABLET, FILM COATED ORAL at 10:54

## 2025-05-31 RX ADMIN — IBUPROFEN 600 MG: 600 TABLET, FILM COATED ORAL at 04:43

## 2025-05-31 RX ADMIN — ACETAMINOPHEN 650 MG: 325 TABLET, FILM COATED ORAL at 02:37

## 2025-05-31 RX ADMIN — CEPHALEXIN 500 MG: 250 CAPSULE ORAL at 02:35

## 2025-05-31 NOTE — PLAN OF CARE
Problem: PAIN - ADULT  Goal: Verbalizes/displays adequate comfort level or baseline comfort level  Description: Interventions:  - Encourage patient to monitor pain and request assistance  - Assess pain using appropriate pain scale  - Administer analgesics as ordered based on type and severity of pain and evaluate response  - Implement non-pharmacological measures as appropriate and evaluate response  - Consider cultural and social influences on pain and pain management  - Notify physician/advanced practitioner if interventions unsuccessful or patient reports new pain  - Educate patient/family on pain management process including their role and importance of  reporting pain   - Provide non-pharmacologic/complimentary pain relief interventions  5/31/2025 1446 by Humaira Sheriff RN  Outcome: Completed  5/31/2025 0759 by Humaira Sheriff RN  Outcome: Progressing

## 2025-05-31 NOTE — LACTATION NOTE
This note was copied from a baby's chart.  CONSULT - LACTATION  Baby Girl Ariza (Stephanie) 2 days female MRN: [id number]    [facility] NURSERY Room / Bed: (N)/(N) Encounter: [id number]    Maternal Information     MOTHER:  Yu Ariza  Maternal Age: 34 y.o.  OB History: # 1 - Date: 25, Sex: Female, Weight: 4205 g (9 lb 4.3 oz), GA: 39w2d, Type: , Low Transverse, Apgar1: 9, Apgar5: 9, Living: Living, Birth Comments: None   Previous breast reduction surgery? No    Lactation history:   Has patient previously breast fed: No   How long had patient previously breast fed:     Previous breast feeding complications:       Past Surgical History:   Procedure Laterality Date    CO  DELIVERY ONLY N/A 2025    Procedure:  SECTION ();  Surgeon: Kimberley Sullivan MD;  Location: [facility] ;  Service: Obstetrics       Birth information:  YOB: 2023   Time of birth: 12:56 AM   Sex: female   Delivery type: , Low Transverse   Birth Weight: 4205 g (9 lb 4.3 oz)   Percent of Weight Change: -8%     Gestational Age: 39w2d   [unfilled]    Reason for Consult:    Reason for Consult: Discharge (ext) - 20 min    Risk Factors:    Risk Factors: Maternal anatomy    Breast and nipple assessment:   Breasts/Nipples  Date Pumping Initiated: 25  Time Pumping Initiated: 0830  Left Breast: Soft, Other (Comment) (pendulous breasts; large dark areolas,)  Right Breast: Soft, Other (Comment) (pendulous; large breasts; large dark areolas)  Left Nipple: Everted, Other (Comment) (round;)  Right Nipple: Everted, Other (Comment) (everted)  Intervention: Hand expression  Breastfeeding Status: Yes  Breastfeeding Progress: Not yet established, Breastfeeding well (per patient, no latch assessed.)  Reasons for not Breastfeeding: Infant medical condition    OB Lactation Tools:    Other OB Lactation Tools  Feeding Devices: Lanolin, Pump, Feeding  Cup    Breast Pump:    Breast Pump  Pump: Personal (Medela hands free breast pump.)  Pump Review/Education: Setup, frequency, and cleaning, Milk storage, Other (Comment) (cycle and hands on pumping)  Initiated by: c hume, ibclc  Date Initiated: 25       Assessment: Not assessed at this time, nurse was doing assessment on baby.    Feeding assessment: feeding well per patient. No latch assessed, encouraged latch assessment before discharge.    Feeding recommendations:  breast feed on demand every 2-3 hours, watching for early feeding cues. At least 8-12 feedings in 24 hours.    Consulted with to Yu to follow up and discuss discharge breastfeeding anticipation guidelines.    Yu reports that baby is starting to progress at the breast. Patient reports that baby is latching well, denies pain throughout feeding. Patient feels a strong pull and tug sensation when baby is sucking. Feeds are also starting to lengthen. Patient is breastfeeding every 2-2.5 hours, offering both breasts.     Encouraged family to call for next feed for a latch assessment before discharge today.    Feeding plan:  Patient is encouraged to continue to breastfeed on demand, every 2-3 hours or when baby showing early feeding cues. Reviewed to offer both breasts during a feed. Discussed that baby can go on the breast up to 4 times in one feeds.  Discussed the importance of ensuring that baby feeds 8-12x in 24 hours and not waiting over 3 hours to feed. Reviewed to watch the baby for hunger and fullness cues.    Encouraged family to monitor baby's outputs, ensuring baby is reaching goal diapers. Discussed that soiled diapers transition by changing color from black meconium to yellow/seedy by day 5.    Discussed initial engorgement time frame and reviewed engorgement comfort measures.  Explained engorgement comfort measures.  Discussed to continue to breastfeed on demand, not waiting too long between feeds- > 3  hours.  Recommended hand expression and/or use of manual breast pump for brief expression to relieve engorgement and discomfort if baby is not due to feed or not due to pump.   Reviewed using same comfort measures listed above to help soften the breast for baby to latch.  Encouraged use of warm compress prior to feeds to help with milk flow and ice between feeds for inflammation.     Discussed community resources for continued breastfeeding support once discharged home. Encouraged to contact with [facility] as needed.    Patient was encouraged to contact lactation for a latch assessment, continued support and/or questions that arise before discharge.      KALIE Maldonado 5/31/2025 9:01 AM

## 2025-05-31 NOTE — ASSESSMENT & PLAN NOTE
PreE w/o SF: Plt 125, P:C 0.5, CMP wnl    BP monitoring    Systolic (12hrs), Av , Min:137 , Max:138   Diastolic (12hrs), Av, Min:54, Max:66

## 2025-05-31 NOTE — ASSESSMENT & PLAN NOTE
Lab Results   Component Value Date    HGBA1C 6.0 (H) 03/26/2025     Recent Labs     05/30/25  0711 05/30/25  1115 05/30/25  1741 05/30/25  2214   POCGLU 67 66 90 118     Blood Sugar Average: Last 72 hrs:  (P) 92.0092210914501319    T1DM: s/p Endo consult  - Lantus 18-20u qhs, Humalog 6-8u tid w/ meals, SSI

## 2025-05-31 NOTE — CONSULTS
This note was copied from a baby's chart.  Inpatient Lactation Consult resolved, please refer to previous lactation notes for further details.    Discussed community resources for continued breastfeeding support once discharged home. Encouraged to contact outpatient [facility] as needed.    Encouraged patient to call for any additional assistance, questions, and concerns that may arise before discharge.

## 2025-05-31 NOTE — PROGRESS NOTES
"Progress Note - OB/GYN   Name: Yu Ariza 34 y.o. female I MRN: [id number]  Unit/Bed#: -01 I Date of Admission: 2025   Date of Service: 2025 I Hospital Day: 4     Assessment & Plan  Status post primary low transverse  section  QBL: 551cc; admission Hb 12.9 >> 11.3  - Continue routine postoperative care  - Pain management with oral analgesics  - DVT Ppx: Lovenox 40mg BID; encourage ambulation  - Passed void trial  - Encourage breastfeeding, familial bonding  Type 1 diabetes mellitus on insulin therapy (HCC)  Lab Results   Component Value Date    HGBA1C 6.0 (H) 2025     Recent Labs     25  0711 25  1115 25  1741 25  2214   POCGLU 67 66 90 118     Blood Sugar Average: Last 72 hrs:  (P) [id number]    T1DM: s/p Endo consult  - Lantus 18-20u qhs, Humalog 6-8u tid w/ meals, SSI    Preeclampsia w/o Severe Features  PreE w/o SF: Plt 125, P:C 0.5, CMP wnl    BP monitoring    Systolic (12hrs), Av , Min:137 , Max:138   Diastolic (12hrs), Av, Min:54, Max:66            Assessment:  Post partum Day #2 s/p 1LTCS(Maternal exhaustion), stable, baby in the room    Subjective/Objective   Chief Complaint:     Post delivery. Patient is doing well. Lochia WNL. Pain well controlled.     Subjective:     Pain: yes, cramping, improved with meds  Tolerating PO: yes  Voiding: yes  Flatus: yes  Ambulating: yes  Chest pain: no  Shortness of breath: no  Leg pain: no  Lochia: minimal    Objective:     Vitals: /66 (BP Location: Right arm)   Pulse 77   Temp 98 °F (36.7 °C) (Oral)   Resp 18   Ht 5' 6\" (1.676 m)   Wt 128 kg (281 lb 12.8 oz)   LMP 2024 (Exact Date)   SpO2 98%   Breastfeeding Yes   BMI 45.48 kg/m²     I/O          07 07 0701   07    Urine (mL/kg/hr) 950 (0.3)     Total Output 950     Net -950                   Lab Results   Component Value Date    WBC 13.62 (H) 2025    HGB 11.3 (L) 2025    HCT 32.9 " (L) 05/29/2025    MCV 87 05/29/2025     (L) 05/29/2025       Physical Exam:     Gen: AAOx3, NAD  CV: no acute distress  Lungs: no acute distress  Abd: Soft, non-tender, non-distended, no rebound or guarding  Uterine fundus firm and non-tender, 2 cm below the umbilicus. Incision c/d/I. Dry Mepilex  Ext: Non tender    Griffin Cueva MD  OB GYN PGY1  05/31/25  5:53 AM

## 2025-05-31 NOTE — PLAN OF CARE
Problem: PAIN - ADULT  Goal: Verbalizes/displays adequate comfort level or baseline comfort level  Description: Interventions:  - Encourage patient to monitor pain and request assistance  - Assess pain using appropriate pain scale  - Administer analgesics as ordered based on type and severity of pain and evaluate response  - Implement non-pharmacological measures as appropriate and evaluate response  - Consider cultural and social influences on pain and pain management  - Notify physician/advanced practitioner if interventions unsuccessful or patient reports new pain  - Educate patient/family on pain management process including their role and importance of  reporting pain   - Provide non-pharmacologic/complimentary pain relief interventions  Outcome: Progressing     Problem: INFECTION - ADULT  Goal: Absence or prevention of progression during hospitalization  Description: INTERVENTIONS:  - Assess and monitor for signs and symptoms of infection  - Monitor lab/diagnostic results  - Monitor all insertion sites, i.e. indwelling lines, tubes, and drains  - Monitor endotracheal if appropriate and nasal secretions for changes in amount and color  - [place] appropriate cooling/warming therapies per order  - Administer medications as ordered  - Instruct and encourage patient and family to use good hand hygiene technique  - Identify and instruct in appropriate isolation precautions for identified infection/condition  Outcome: Progressing  Goal: Absence of fever/infection during neutropenic period  Description: INTERVENTIONS:  - Monitor WBC  - Perform strict hand hygiene  - Limit to healthy visitors only  - No plants, dried, fresh or silk flowers with velasquez in patient room  Outcome: Progressing     Problem: SAFETY ADULT  Goal: Patient will remain free of falls  Description: INTERVENTIONS:  - Educate patient/family on patient safety including physical limitations  - Instruct patient to call for assistance with activity   -  Consider consulting OT/PT to assist with strengthening/mobility based on AM PAC & JH-HLM score  - Consult OT/PT to assist with strengthening/mobility   - Keep Call bell within reach  - Keep bed low and locked with side rails adjusted as appropriate  - Keep care items and personal belongings within reach  - Initiate and maintain comfort rounds  - Make Fall Risk Sign visible to staff  - Apply yellow socks and bracelet for high fall risk patients  - Consider moving patient to room near nurses station  Outcome: Progressing  Goal: Maintain or return to baseline ADL function  Description: INTERVENTIONS:  -  Assess patient's ability to carry out ADLs; assess patient's baseline for ADL function and identify physical deficits which impact ability to perform ADLs (bathing, care of mouth/teeth, toileting, grooming, dressing, etc.)  - Assess/evaluate cause of self-care deficits   - Assess range of motion  - Assess patient's mobility; develop plan if impaired  - Assess patient's need for assistive devices and provide as appropriate  - Encourage maximum independence but intervene and supervise when necessary  - Involve family in performance of ADLs  - Assess for home care needs following discharge   - Consider OT consult to assist with ADL evaluation and planning for discharge  - Provide patient education as appropriate  - Monitor functional capacity and physical performance, use of AM PAC & JH-HLM   - Monitor gait, balance and fatigue with ambulation    Outcome: Progressing  Goal: Maintains/Returns to pre admission functional level  Description: INTERVENTIONS:  - Perform AM-PAC 6 Click Basic Mobility/ Daily Activity assessment daily.  - Set and communicate daily mobility goal to care team and patient/family/caregiver.   - Record patient progress and toleration of activity level   Outcome: Progressing     Problem: DISCHARGE PLANNING  Goal: Discharge to home or other facility with appropriate resources  Description:  INTERVENTIONS:  - Identify barriers to discharge w/patient and caregiver  - Arrange for needed discharge resources and transportation as appropriate  - Identify discharge learning needs (meds, wound care, etc.)  - Arrange for interpretive services to assist at discharge as needed  - Refer to Case Management Department for coordinating discharge planning if the patient needs post-hospital services based on physician/advanced practitioner order or complex needs related to functional status, cognitive ability, or social support system  Outcome: Progressing     Problem: POSTPARTUM  Goal: Experiences normal postpartum course  Description: INTERVENTIONS:  - Monitor maternal vital signs  - Assess uterine involution and lochia  Outcome: Progressing  Goal: Appropriate maternal -  bonding  Description: INTERVENTIONS:  - Identify family support  - Assess for appropriate maternal/infant bonding   -Encourage maternal/infant bonding opportunities  - Referral to  or  as needed  Outcome: Progressing  Goal: Establishment of infant feeding pattern  Description: INTERVENTIONS:  - Assess breast/bottle feeding  - Refer to lactation as needed  Outcome: Progressing  Goal: Incision(s), wounds(s) or drain site(s) healing without S/S of infection  Description: INTERVENTIONS  - Assess and document dressing, incision, wound bed, drain sites and surrounding tissue  - Provide patient and family education  - Perform skin care/dressing changes every   Outcome: Progressing

## 2025-06-01 ENCOUNTER — PATIENT MESSAGE (OUTPATIENT)
Dept: OBGYN CLINIC | Facility: CLINIC | Age: 35
End: 2025-06-01

## 2025-06-02 PROCEDURE — 88307 TISSUE EXAM BY PATHOLOGIST: CPT | Performed by: PATHOLOGY

## 2025-06-02 NOTE — DISCHARGE SUMMARY
Discharge Summary - OB/GYN   Name: Yu Ariza 34 y.o. female I MRN: [id number]  Unit/Bed#: LD OVR I Date of Admission: 2025   Date of Service: 2025 I Hospital Day: 4    Obstetrics Discharge Summary  Yu Ariza 34 y.o. female MRN: [id number]  Unit/Bed#: LD OVR Encounter: [id number]    Admission Date: 2025     Discharge Date: 2025    Admitting Attending: Dr. House  Delivery Attending: Dr. Sullivan  Discharging Attending: Dr. Jon    Admitting Diagnoses:   39 weeks of gestation  Uterine fibroid  Type 1 diabetes mellitus    Discharge Diagnoses:   Same, delivered  1LTCS    Delivery  Route of Delivery: , Low Transverse    Anesthesia: Epidural,   QBL: 551 ml    Delivery: , Low Transverse at 2025 12:56 AM    Baby's Weight: 4205 g (9 lb 4.3 oz); 148.33    Apgar scores: 9  and 9  at 1 and 5 minutes, respectively      Hospital course: Yu Ariza is now a 34 y.o.  presented with history significant for uterine fibroid, T1DM. She presented to labor and delivery for induction of labor in the setting of T1DM. On initial exam she was noted to be 0/0/-5. She was induced with Cytotec x2. [place] balloon was later placed. After dislodgement, she was 3.5/60/-3 and pitocin was started. Spontaneous rupture of membranes for clear fluid occurred. She progressed to complete and began to push.     After pushing for approximately 1 hour, the patient's pubic arch was noted to be narrow, and there had been little fetal descent. She expressed that she was tired from pushing and requested  section.    Her post-delivery course was uncomplicated. Her postpartum pain was well controlled with oral analgesics. Maternal blood type is O positive so RhoGAM wasn'w indicated.    On day of discharge, she was ambulating and able to reasonably perform all ADLs. She was voiding and had appropriate bowel function. Pain was well controlled. She was discharged home on  postpartum day #2 without complications. Patient was instructed to follow up with her OBGYN as an outpatient and was given appropriate warnings to call provider if she develops signs of infection or uncontrolled pain.    Complications: none apparent    Condition at discharge: good     Provisions for Follow-Up Care:  Please see after visit summary for information related to follow-up care and any pertinent home health orders.      Disposition: Home    Planned Readmission: No    Discharge Medications:   Please see AVS for a complete list of discharge medications.    Discharge instructions :   -Do not place anything (no partner, tampons or douche) in your vagina for 6 weeks  -You may walk for exercise for the first 6 weeks then gradually return to your usual activities   -Please do not drive for 1 week if you have no stitches and for 2 weeks if you have stitches    -You may take baths or shower per your preference   -Please examine your breasts in the mirror daily and call your doctor for redness or tenderness or increased warmth    -Please call your doctor's office if temperature > 100.4*F or 38* C, worsening pain or a foul discharge.    Griffin Cueva MD  OB GYN PGY1

## 2025-06-04 ENCOUNTER — POSTPARTUM VISIT (OUTPATIENT)
Age: 35
End: 2025-06-04

## 2025-06-04 VITALS
DIASTOLIC BLOOD PRESSURE: 80 MMHG | BODY MASS INDEX: 41.85 KG/M2 | WEIGHT: 260.4 LBS | HEIGHT: 66 IN | SYSTOLIC BLOOD PRESSURE: 128 MMHG

## 2025-06-04 DIAGNOSIS — Z98.891 STATUS POST PRIMARY LOW TRANSVERSE CESAREAN SECTION: ICD-10-CM

## 2025-06-04 DIAGNOSIS — O14.93 PRE-ECLAMPSIA IN THIRD TRIMESTER: Primary | ICD-10-CM

## 2025-06-04 PROCEDURE — 99024 POSTOP FOLLOW-UP VISIT: CPT | Performed by: OBSTETRICS & GYNECOLOGY

## 2025-06-04 NOTE — ASSESSMENT & PLAN NOTE
Pt doing well postop  Incision feels okay, mepilex is still on- advised to remove at 1 week postop  RTO for 3 week pp visit

## 2025-06-04 NOTE — PROGRESS NOTES
Name: Yu Ariza      : 1990      MRN: [id number]  Encounter Provider: Stephanie Luis DO  Encounter Date: 2025   Encounter department: [facility] [place]  :  Assessment & Plan  Pre-eclampsia in third trimester  Bp normotensive today, no meds at discharge  Denies s/s of pre-e       Status post primary low transverse  section  Pt doing well postop  Incision feels okay, mepilex is still on- advised to remove at 1 week postop  RTO for 3 week pp visit             History of Present Illness     HPI    Yu Ariza is a 34 y.o. female who presents today for 1 week bp and incision check.     She is postop from primary  on   She has overall been feeling well.   Denies any fever/chills, incision drainage or concern for infection.   She is voiding and +BM as normal.     Breast feeding, pumping- going well. Milk is coming in  Sometimes has clogged ducts- improves with heat and massage  No s/s of mastitis    Lochia is minimal  EPDS 0    History obtained from: patient    Review of Systems  Negative unless otherwise noted in HPI.     Past Medical History   Past Medical History[1]  Past Surgical History[2]  Family History[3]   reports that she has never smoked. She has never used smokeless tobacco. She reports that she does not currently use alcohol. She reports that she does not use drugs.  Current Outpatient Medications   Medication Instructions    acetaminophen (TYLENOL) 650 mg, Oral, Every 6 hours scheduled    Alcohol Swabs 70 % PADS May substitute brand based on insurance coverage. Check glucose ACHS.    Blood Glucose Monitoring Suppl (OneTouch Verio Reflect) w/Device KIT May substitute brand based on insurance coverage. Check glucose ACHS.    Glucagon, rDNA, (Glucagon Emergency) 1 MG KIT     glucose blood (OneTouch Verio) test strip May substitute brand based on insurance coverage. Check glucose ACHS.    ibuprofen (MOTRIN) 600 mg, Oral,  "Every 6 hours    Insulin Glargine Solostar (LANTUS SOLOSTAR) 38 Units, Subcutaneous, Daily (early morning)    insulin lispro (HUMALOG/ADMELOG) 2-12 Units, Subcutaneous, 3 times daily before meals    Insulin Pen Needle (BD Pen Needle Luz 2nd Gen) 32G X 4 MM MISC USE WITH INSULIN PEN    OneTouch Delica Lancets 33G MISC May substitute brand based on insurance coverage. Check glucose ACHS.    Prenatal Multivit-Min-Fe-FA (PRE-ARYAN PO) Daily   Allergies[4]      Objective   /80 (BP Location: Left arm, Patient Position: Sitting, Cuff Size: Large)   Ht 5' 6\" (1.676 m)   Wt 118 kg (260 lb 6.4 oz)   LMP 2024 (Exact Date)   Breastfeeding Yes   BMI 42.03 kg/m²      Physical Exam  Constitutional:       General: She is not in acute distress.  HENT:      Head: Normocephalic and atraumatic.      Mouth/Throat:      Mouth: Mucous membranes are moist.     Cardiovascular:      Rate and Rhythm: Normal rate.   Pulmonary:      Effort: Pulmonary effort is normal. No respiratory distress.   Abdominal:      General: There is no distension.      Tenderness: There is no abdominal tenderness.      Comments: Incision with mepilex in place  C/d/i   Genitourinary:     Comments: deferred    Skin:     General: Skin is warm and dry.     Neurological:      Mental Status: She is alert.     Psychiatric:         Mood and Affect: Mood normal.         Behavior: Behavior normal.                [1]   Past Medical History:  Diagnosis Date    Chlamydia 2014    Diabetes mellitus (HCC) 26    Admitted to the hospital    Type 1 diabetes mellitus (HCC) 2024    DKA on admission    Varicella     had disease and vaccines   [2]   Past Surgical History:  Procedure Laterality Date    PA  DELIVERY ONLY N/A 2025    Procedure:  SECTION ();  Surgeon: Kimberley Sullivan MD;  Location: [facility] ;  Service: Obstetrics   [3]   Family History  Problem Relation Name Age of Onset    Hypertension Mother Therese Anderson     " Hyperlipidemia Father N/a     Hypertension Father N/a     Diabetes type II Father N/a     Kidney disease Father N/a     Heart attack Maternal Grandmother      Heart disease Maternal Grandmother      No Known Problems Maternal Grandfather      No Known Problems Paternal Grandmother      Colon cancer Paternal Grandfather      No Known Problems Brother Maharaj     No Known Problems Maternal Aunt      No Known Problems Paternal Aunt      Diabetes type II Paternal Uncle      Breast cancer Neg Hx      Ovarian cancer Neg Hx      Cervical cancer Neg Hx      Uterine cancer Neg Hx     [4] No Known Allergies

## 2025-06-05 ENCOUNTER — TELEPHONE (OUTPATIENT)
Dept: OBGYN CLINIC | Facility: CLINIC | Age: 35
End: 2025-06-05

## 2025-06-05 NOTE — TELEPHONE ENCOUNTER
----- Message from Nemo DILLON sent at 5/29/2025  2:54 PM EDT -----  Regarding: Postpartum  delivered 5/29

## 2025-06-17 DIAGNOSIS — E13.10 DIABETIC KETOACIDOSIS WITHOUT COMA ASSOCIATED WITH OTHER SPECIFIED DIABETES MELLITUS (HCC): ICD-10-CM

## 2025-06-17 NOTE — PROGRESS NOTES
Name: Yu Ariza      : 1990      MRN: [id number]  Encounter Provider: Emilia Scott PA-C  Encounter Date: 2025   Encounter department: [facility]  :  Assessment & Plan  Encounter for postpartum visit    May gradually resume normal activities.  Continue prenatal vitamin daily while breastfeeding or until you've run out, if formula feeding.   Consider taking prenatal vitamin 6-12 months before a future pregnancy to reduce risk of neural tube defect.  Start contraception at 6 weeks unless otherwise directed.   Resume sexual activity once comfortable and protected to do so. If breastfeeding, may need to use a lubricant with sex due to vaginal dryness.   If symptoms of depression occur, please call the office to schedule an appt. This may happen up until your baby's first birthday.  Return to office in 3-4 months for yearly exam, sooner as needed.       Pre-eclampsia in third trimester     Normotensive and asymptomatic without any medications  Reviewed the signs and symptoms of preeclampsia advised her to call if she experiences them  Advised the patient to monitor her blood pressure and report any elevated findings  Follow-up in 2-3 weeks for a blood pressure check otherwise 3-4-month follow-up for an annual exam        History of Present Illness   HPI  Yu Ariza is a 34 y.o. female here for postpartum visit.  She is s/p  (due to maternal exhaustion) on 25 .      She denies abdominal pain, chest pain, shortness of breath, visual changes, headaches or edema.      Gender: male or female   Apgars: A1- 9 A5 - 9  Weight: 9lb 4.3oz  Her lochia has resolved.    She is Breastfeeding without problems.   She denies postpartum blues/depression.  Her EPDS is 0.    Substance use screen: 0    Last Pap: 2024    At this point she would like to hold off on birth control for now.    Review of Systems   Constitutional:  Negative for  chills, fatigue and fever.   Eyes:  Negative for visual disturbance.   Respiratory:  Negative for shortness of breath.    Cardiovascular:  Negative for chest pain and palpitations.   Gastrointestinal:  Negative for abdominal pain, anal bleeding, blood in stool, constipation, diarrhea, nausea and vomiting.   Endocrine: Negative for heat intolerance.   Genitourinary:  Negative for difficulty urinating, dysuria, frequency, hematuria, pelvic pain, urgency, vaginal bleeding, vaginal discharge and vaginal pain.   Skin:  Negative for rash.   Psychiatric/Behavioral:  Negative for dysphoric mood. The patient is not nervous/anxious.      Medications Ordered Prior to Encounter[1]   Social History[2]     Objective   /78 (BP Location: Left arm, Patient Position: Sitting, Cuff Size: Standard)   Wt 114 kg (251 lb 9.6 oz)   LMP 2024 (Exact Date)   BMI 40.61 kg/m²      Physical Exam  Vitals and nursing note reviewed.   Constitutional:       General: She is not in acute distress.     Appearance: She is well-developed.   HENT:      Head: Normocephalic and atraumatic.     Eyes:      Extraocular Movements: Extraocular movements intact.       Cardiovascular:      Rate and Rhythm: Normal rate and regular rhythm.   Pulmonary:      Effort: Pulmonary effort is normal. No respiratory distress.      Breath sounds: Normal breath sounds.   Chest:   Breasts:     Right: No swelling, bleeding, inverted nipple, mass, nipple discharge, skin change or tenderness.      Left: No swelling, bleeding, inverted nipple, mass, nipple discharge, skin change or tenderness.   Abdominal:      Palpations: Abdomen is soft.      Tenderness: There is no abdominal tenderness.      Hernia: There is no hernia in the left inguinal area or right inguinal area.      Comments:  incision is clean dry and intact, healing well   Genitourinary:     General: Normal vulva.      Exam position: Lithotomy position.      Pubic Area: No rash.       Labia:          Right: No rash, tenderness or lesion.         Left: No rash, tenderness or lesion.       Urethra: No urethral pain or urethral swelling.      Vagina: No vaginal discharge, erythema, tenderness, bleeding or lesions.      Cervix: No cervical motion tenderness, friability, lesion, erythema or cervical bleeding. Discharge: Small amount of blood.     Uterus: Not enlarged and not tender.       Adnexa:         Right: No mass, tenderness or fullness.          Left: No mass, tenderness or fullness.     Lymphadenopathy:      Upper Body:      Right upper body: No supraclavicular, axillary or pectoral adenopathy.      Left upper body: No supraclavicular, axillary or pectoral adenopathy.      Lower Body: No right inguinal adenopathy. No left inguinal adenopathy.     Skin:     General: Skin is warm and dry.     Neurological:      Mental Status: She is alert.     Psychiatric:         Mood and Affect: Mood normal.         Behavior: Behavior normal.         Emilia Scott PA-C         [1]   Current Outpatient Medications on File Prior to Visit   Medication Sig Dispense Refill    acetaminophen (TYLENOL) 325 mg tablet Take 2 tablets (650 mg total) by mouth every 6 (six) hours      Alcohol Swabs 70 % PADS May substitute brand based on insurance coverage. Check glucose ACHS. 200 each 0    Blood Glucose Monitoring Suppl (OneTouch Verio Reflect) w/Device KIT May substitute brand based on insurance coverage. Check glucose ACHS. 1 kit 0    Glucagon, rDNA, (Glucagon Emergency) 1 MG KIT       glucose blood (OneTouch Verio) test strip May substitute brand based on insurance coverage. Check glucose ACHS. 200 each 0    ibuprofen (MOTRIN) 600 mg tablet Take 1 tablet (600 mg total) by mouth every 6 (six) hours 30 tablet 0    Insulin Glargine Solostar (Lantus SoloStar) 100 UNIT/ML SOPN Inject 0.38 mL (38 Units total) under the skin daily in the early morning 15 mL 0    insulin lispro (HumALOG/ADMELOG) 100 units/mL injection Inject 2-12 Units  under the skin 3 (three) times a day before meals 30 mL 1    Insulin Pen Needle (BD Pen Needle Luz 2nd Gen) 32G X 4 MM MISC USE WITH INSULIN  each 1    OneTouch Delica Lancets 33G MISC May substitute brand based on insurance coverage. Check glucose ACHS. 200 each 0    Prenatal Multivit-Min-Fe-FA (PRE-ARYAN PO) Take by mouth in the morning.       No current facility-administered medications on file prior to visit.   [2]   Social History  Tobacco Use    Smoking status: Never    Smokeless tobacco: Never   Vaping Use    Vaping status: Never Used   Substance and Sexual Activity    Alcohol use: Not Currently    Drug use: Never    Sexual activity: Yes     Partners: Male     Birth control/protection: None

## 2025-06-18 ENCOUNTER — POSTPARTUM VISIT (OUTPATIENT)
Age: 35
End: 2025-06-18

## 2025-06-18 VITALS — BODY MASS INDEX: 40.61 KG/M2 | WEIGHT: 251.6 LBS | SYSTOLIC BLOOD PRESSURE: 126 MMHG | DIASTOLIC BLOOD PRESSURE: 78 MMHG

## 2025-06-18 DIAGNOSIS — O14.93 PRE-ECLAMPSIA IN THIRD TRIMESTER: ICD-10-CM

## 2025-06-18 PROCEDURE — 99024 POSTOP FOLLOW-UP VISIT: CPT

## 2025-06-18 RX ORDER — INSULIN LISPRO 100 [IU]/ML
2-12 INJECTION, SOLUTION INTRAVENOUS; SUBCUTANEOUS
Qty: 30 ML | Refills: 1 | Status: SHIPPED | OUTPATIENT
Start: 2025-06-18

## 2025-06-18 RX ORDER — INSULIN GLARGINE 100 [IU]/ML
38 INJECTION, SOLUTION SUBCUTANEOUS
Qty: 15 ML | Refills: 0 | Status: SHIPPED | OUTPATIENT
Start: 2025-06-18 | End: 2025-09-22

## 2025-06-18 NOTE — ASSESSMENT & PLAN NOTE
Normotensive and asymptomatic without any medications  Reviewed the signs and symptoms of preeclampsia advised her to call if she experiences them  Advised the patient to monitor her blood pressure and report any elevated findings  Follow-up in 2-3 weeks for a blood pressure check otherwise 3-4-month follow-up for an annual exam

## 2025-08-05 ENCOUNTER — ANNUAL EXAM (OUTPATIENT)
Dept: OBGYN CLINIC | Facility: MEDICAL CENTER | Age: 35
End: 2025-08-05
Payer: COMMERCIAL

## 2025-08-05 VITALS
SYSTOLIC BLOOD PRESSURE: 108 MMHG | BODY MASS INDEX: 40.18 KG/M2 | WEIGHT: 250 LBS | HEIGHT: 66 IN | DIASTOLIC BLOOD PRESSURE: 60 MMHG

## 2025-08-05 DIAGNOSIS — Z01.419 ENCOUNTER FOR GYNECOLOGICAL EXAMINATION (GENERAL) (ROUTINE) WITHOUT ABNORMAL FINDINGS: Primary | ICD-10-CM

## 2025-08-05 DIAGNOSIS — D25.1 INTRAMURAL UTERINE FIBROID: ICD-10-CM

## 2025-08-05 PROCEDURE — S0612 ANNUAL GYNECOLOGICAL EXAMINA: HCPCS | Performed by: CLINICAL NURSE SPECIALIST

## 2025-08-05 RX ORDER — INSULIN GLARGINE-AGLR 100 [IU]/ML
38 INJECTION, SOLUTION SUBCUTANEOUS
COMMUNITY
Start: 2025-06-18

## (undated) DEVICE — ADHESIVE SKIN HIGH VISCOSITY EXOFIN 1ML

## (undated) DEVICE — SUT VICRYL 0 CT-1 27 IN J260H

## (undated) DEVICE — PACK C-SECTION PBDS

## (undated) DEVICE — SUT VICRYL 0 CTX 36 IN J978H

## (undated) DEVICE — CHLORAPREP HI-LITE 26ML ORANGE

## (undated) DEVICE — MEDI-VAC YANKAUER SUCTION HANDLE W/STRAIGHT TIP & CONTROL VENT: Brand: CARDINAL HEALTH

## (undated) DEVICE — ADHESIVE SKN CLSR HISTOACRYL FLEX 0.5ML LF

## (undated) DEVICE — DRESSING MEPILEX AG BORDER POST-OP 4 X 12 IN

## (undated) DEVICE — GLOVE SRG BIOGEL ECLIPSE 7

## (undated) DEVICE — SKIN MARKER DUAL TIP WITH RULER CAP, FLEXIBLE RULER AND LABELS: Brand: DEVON

## (undated) DEVICE — Device

## (undated) DEVICE — SUT MONOCRYL 3-0 UNDYED KS CS-1 Y523H

## (undated) DEVICE — GLOVE INDICATOR PI UNDERGLOVE SZ 7 BLUE